# Patient Record
Sex: MALE | Race: BLACK OR AFRICAN AMERICAN | NOT HISPANIC OR LATINO | URBAN - METROPOLITAN AREA
[De-identification: names, ages, dates, MRNs, and addresses within clinical notes are randomized per-mention and may not be internally consistent; named-entity substitution may affect disease eponyms.]

---

## 2024-01-01 ENCOUNTER — INPATIENT (INPATIENT)
Facility: HOSPITAL | Age: 69
LOS: 15 days | End: 2024-01-30
Attending: INTERNAL MEDICINE | Admitting: INTERNAL MEDICINE
Payer: MEDICARE

## 2024-01-01 VITALS
HEIGHT: 70 IN | OXYGEN SATURATION: 94 % | DIASTOLIC BLOOD PRESSURE: 42 MMHG | SYSTOLIC BLOOD PRESSURE: 84 MMHG | WEIGHT: 169.98 LBS | HEART RATE: 78 BPM | RESPIRATION RATE: 24 BRPM

## 2024-01-01 VITALS
HEART RATE: 113 BPM | RESPIRATION RATE: 22 BRPM | TEMPERATURE: 99 F | SYSTOLIC BLOOD PRESSURE: 135 MMHG | DIASTOLIC BLOOD PRESSURE: 76 MMHG | OXYGEN SATURATION: 97 %

## 2024-01-01 DIAGNOSIS — Z51.5 ENCOUNTER FOR PALLIATIVE CARE: ICD-10-CM

## 2024-01-01 DIAGNOSIS — N17.9 ACUTE KIDNEY FAILURE, UNSPECIFIED: ICD-10-CM

## 2024-01-01 DIAGNOSIS — E43 UNSPECIFIED SEVERE PROTEIN-CALORIE MALNUTRITION: ICD-10-CM

## 2024-01-01 DIAGNOSIS — J96.01 ACUTE RESPIRATORY FAILURE WITH HYPOXIA: ICD-10-CM

## 2024-01-01 DIAGNOSIS — A41.9 SEPSIS, UNSPECIFIED ORGANISM: ICD-10-CM

## 2024-01-01 DIAGNOSIS — R53.2 FUNCTIONAL QUADRIPLEGIA: ICD-10-CM

## 2024-01-01 DIAGNOSIS — G93.40 ENCEPHALOPATHY, UNSPECIFIED: ICD-10-CM

## 2024-01-01 LAB
A1C WITH ESTIMATED AVERAGE GLUCOSE RESULT: 4.8 % — SIGNIFICANT CHANGE UP (ref 4–5.6)
A1C WITH ESTIMATED AVERAGE GLUCOSE RESULT: 4.8 % — SIGNIFICANT CHANGE UP (ref 4–5.6)
ALBUMIN SERPL ELPH-MCNC: 1.1 G/DL — LOW (ref 3.3–5)
ALBUMIN SERPL ELPH-MCNC: 1.2 G/DL — LOW (ref 3.3–5)
ALBUMIN SERPL ELPH-MCNC: 1.3 G/DL — LOW (ref 3.3–5)
ALBUMIN SERPL ELPH-MCNC: 1.4 G/DL — LOW (ref 3.3–5)
ALBUMIN SERPL ELPH-MCNC: 1.5 G/DL — LOW (ref 3.3–5)
ALBUMIN SERPL ELPH-MCNC: 1.5 G/DL — LOW (ref 3.3–5)
ALBUMIN SERPL ELPH-MCNC: 1.6 G/DL — LOW (ref 3.3–5)
ALP SERPL-CCNC: 107 U/L — SIGNIFICANT CHANGE UP (ref 40–120)
ALP SERPL-CCNC: 137 U/L — HIGH (ref 40–120)
ALP SERPL-CCNC: 144 U/L — HIGH (ref 40–120)
ALP SERPL-CCNC: 144 U/L — HIGH (ref 40–120)
ALP SERPL-CCNC: 201 U/L — HIGH (ref 40–120)
ALP SERPL-CCNC: 217 U/L — HIGH (ref 40–120)
ALP SERPL-CCNC: 220 U/L — HIGH (ref 40–120)
ALP SERPL-CCNC: 226 U/L — HIGH (ref 40–120)
ALP SERPL-CCNC: 235 U/L — HIGH (ref 40–120)
ALP SERPL-CCNC: 245 U/L — HIGH (ref 40–120)
ALP SERPL-CCNC: 253 U/L — HIGH (ref 40–120)
ALP SERPL-CCNC: 265 U/L — HIGH (ref 40–120)
ALP SERPL-CCNC: 280 U/L — HIGH (ref 40–120)
ALP SERPL-CCNC: 292 U/L — HIGH (ref 40–120)
ALP SERPL-CCNC: 299 U/L — HIGH (ref 40–120)
ALP SERPL-CCNC: 99 U/L — SIGNIFICANT CHANGE UP (ref 40–120)
ALT FLD-CCNC: 11 U/L — LOW (ref 12–78)
ALT FLD-CCNC: 11 U/L — LOW (ref 12–78)
ALT FLD-CCNC: 13 U/L — SIGNIFICANT CHANGE UP (ref 12–78)
ALT FLD-CCNC: 14 U/L — SIGNIFICANT CHANGE UP (ref 12–78)
ALT FLD-CCNC: 14 U/L — SIGNIFICANT CHANGE UP (ref 12–78)
ALT FLD-CCNC: 17 U/L — SIGNIFICANT CHANGE UP (ref 12–78)
ALT FLD-CCNC: 18 U/L — SIGNIFICANT CHANGE UP (ref 12–78)
ALT FLD-CCNC: 20 U/L — SIGNIFICANT CHANGE UP (ref 12–78)
ALT FLD-CCNC: 21 U/L — SIGNIFICANT CHANGE UP (ref 12–78)
ALT FLD-CCNC: 23 U/L — SIGNIFICANT CHANGE UP (ref 12–78)
ALT FLD-CCNC: 24 U/L — SIGNIFICANT CHANGE UP (ref 12–78)
ALT FLD-CCNC: 8 U/L — LOW (ref 12–78)
AMMONIA BLD-MCNC: 24 UMOL/L — SIGNIFICANT CHANGE UP (ref 11–32)
AMMONIA BLD-MCNC: 24 UMOL/L — SIGNIFICANT CHANGE UP (ref 11–32)
AMMONIA BLD-MCNC: 35 UMOL/L — HIGH (ref 11–32)
AMMONIA BLD-MCNC: 40 UMOL/L — HIGH (ref 11–32)
AMMONIA BLD-MCNC: 45 UMOL/L — HIGH (ref 11–32)
AMMONIA BLD-MCNC: 65 UMOL/L — HIGH (ref 11–32)
ANION GAP SERPL CALC-SCNC: 10 MMOL/L — SIGNIFICANT CHANGE UP (ref 5–17)
ANION GAP SERPL CALC-SCNC: 11 MMOL/L — SIGNIFICANT CHANGE UP (ref 5–17)
ANION GAP SERPL CALC-SCNC: 11 MMOL/L — SIGNIFICANT CHANGE UP (ref 5–17)
ANION GAP SERPL CALC-SCNC: 12 MMOL/L — SIGNIFICANT CHANGE UP (ref 5–17)
ANION GAP SERPL CALC-SCNC: 3 MMOL/L — LOW (ref 5–17)
ANION GAP SERPL CALC-SCNC: 5 MMOL/L — SIGNIFICANT CHANGE UP (ref 5–17)
ANION GAP SERPL CALC-SCNC: 5 MMOL/L — SIGNIFICANT CHANGE UP (ref 5–17)
ANION GAP SERPL CALC-SCNC: 6 MMOL/L — SIGNIFICANT CHANGE UP (ref 5–17)
ANION GAP SERPL CALC-SCNC: 6 MMOL/L — SIGNIFICANT CHANGE UP (ref 5–17)
ANION GAP SERPL CALC-SCNC: 7 MMOL/L — SIGNIFICANT CHANGE UP (ref 5–17)
ANION GAP SERPL CALC-SCNC: 8 MMOL/L — SIGNIFICANT CHANGE UP (ref 5–17)
ANION GAP SERPL CALC-SCNC: 9 MMOL/L — SIGNIFICANT CHANGE UP (ref 5–17)
ANISOCYTOSIS BLD QL: SIGNIFICANT CHANGE UP
ANISOCYTOSIS BLD QL: SIGNIFICANT CHANGE UP
ANISOCYTOSIS BLD QL: SLIGHT — SIGNIFICANT CHANGE UP
APPEARANCE UR: ABNORMAL
APPEARANCE UR: ABNORMAL
APTT BLD: 44.6 SEC — HIGH (ref 24.5–35.6)
APTT BLD: 44.6 SEC — HIGH (ref 24.5–35.6)
APTT BLD: 46.7 SEC — HIGH (ref 24.5–35.6)
APTT BLD: 46.7 SEC — HIGH (ref 24.5–35.6)
AST SERPL-CCNC: 32 U/L — SIGNIFICANT CHANGE UP (ref 15–37)
AST SERPL-CCNC: 39 U/L — HIGH (ref 15–37)
AST SERPL-CCNC: 45 U/L — HIGH (ref 15–37)
AST SERPL-CCNC: 46 U/L — HIGH (ref 15–37)
AST SERPL-CCNC: 47 U/L — HIGH (ref 15–37)
AST SERPL-CCNC: 48 U/L — HIGH (ref 15–37)
AST SERPL-CCNC: 48 U/L — HIGH (ref 15–37)
AST SERPL-CCNC: 50 U/L — HIGH (ref 15–37)
AST SERPL-CCNC: 52 U/L — HIGH (ref 15–37)
AST SERPL-CCNC: 52 U/L — HIGH (ref 15–37)
AST SERPL-CCNC: 55 U/L — HIGH (ref 15–37)
AST SERPL-CCNC: 55 U/L — HIGH (ref 15–37)
AST SERPL-CCNC: 60 U/L — HIGH (ref 15–37)
AST SERPL-CCNC: 65 U/L — HIGH (ref 15–37)
AST SERPL-CCNC: 65 U/L — HIGH (ref 15–37)
AST SERPL-CCNC: 79 U/L — HIGH (ref 15–37)
AST SERPL-CCNC: 80 U/L — HIGH (ref 15–37)
AST SERPL-CCNC: 87 U/L — HIGH (ref 15–37)
AST SERPL-CCNC: 92 U/L — HIGH (ref 15–37)
BACTERIA # UR AUTO: ABNORMAL /HPF
BACTERIA # UR AUTO: ABNORMAL /HPF
BASE EXCESS BLDA CALC-SCNC: 17.5 MMOL/L — HIGH (ref -2–3)
BASE EXCESS BLDV CALC-SCNC: 7.2 MMOL/L — HIGH (ref -2–3)
BASE EXCESS BLDV CALC-SCNC: 7.2 MMOL/L — HIGH (ref -2–3)
BASE EXCESS BLDV CALC-SCNC: 9.7 MMOL/L — HIGH (ref -2–3)
BASE EXCESS BLDV CALC-SCNC: 9.7 MMOL/L — HIGH (ref -2–3)
BASOPHILS # BLD AUTO: 0 K/UL — SIGNIFICANT CHANGE UP (ref 0–0.2)
BASOPHILS # BLD AUTO: 0.01 K/UL — SIGNIFICANT CHANGE UP (ref 0–0.2)
BASOPHILS # BLD AUTO: 0.01 K/UL — SIGNIFICANT CHANGE UP (ref 0–0.2)
BASOPHILS # BLD AUTO: 0.08 K/UL — SIGNIFICANT CHANGE UP (ref 0–0.2)
BASOPHILS # BLD AUTO: 0.09 K/UL — SIGNIFICANT CHANGE UP (ref 0–0.2)
BASOPHILS # BLD AUTO: 0.12 K/UL — SIGNIFICANT CHANGE UP (ref 0–0.2)
BASOPHILS # BLD AUTO: 0.12 K/UL — SIGNIFICANT CHANGE UP (ref 0–0.2)
BASOPHILS # BLD AUTO: 0.13 K/UL — SIGNIFICANT CHANGE UP (ref 0–0.2)
BASOPHILS NFR BLD AUTO: 0 % — SIGNIFICANT CHANGE UP (ref 0–2)
BASOPHILS NFR BLD AUTO: 0.2 % — SIGNIFICANT CHANGE UP (ref 0–2)
BASOPHILS NFR BLD AUTO: 0.2 % — SIGNIFICANT CHANGE UP (ref 0–2)
BASOPHILS NFR BLD AUTO: 0.5 % — SIGNIFICANT CHANGE UP (ref 0–2)
BASOPHILS NFR BLD AUTO: 0.5 % — SIGNIFICANT CHANGE UP (ref 0–2)
BASOPHILS NFR BLD AUTO: 0.6 % — SIGNIFICANT CHANGE UP (ref 0–2)
BASOPHILS NFR BLD AUTO: 0.9 % — SIGNIFICANT CHANGE UP (ref 0–2)
BASOPHILS NFR BLD AUTO: 0.9 % — SIGNIFICANT CHANGE UP (ref 0–2)
BILIRUB SERPL-MCNC: 0.1 MG/DL — LOW (ref 0.2–1.2)
BILIRUB SERPL-MCNC: 0.2 MG/DL — SIGNIFICANT CHANGE UP (ref 0.2–1.2)
BILIRUB SERPL-MCNC: 0.2 MG/DL — SIGNIFICANT CHANGE UP (ref 0.2–1.2)
BILIRUB SERPL-MCNC: 0.3 MG/DL — SIGNIFICANT CHANGE UP (ref 0.2–1.2)
BILIRUB SERPL-MCNC: 0.4 MG/DL — SIGNIFICANT CHANGE UP (ref 0.2–1.2)
BILIRUB SERPL-MCNC: 0.4 MG/DL — SIGNIFICANT CHANGE UP (ref 0.2–1.2)
BILIRUB SERPL-MCNC: 0.5 MG/DL — SIGNIFICANT CHANGE UP (ref 0.2–1.2)
BILIRUB SERPL-MCNC: 0.5 MG/DL — SIGNIFICANT CHANGE UP (ref 0.2–1.2)
BILIRUB UR-MCNC: NEGATIVE — SIGNIFICANT CHANGE UP
BILIRUB UR-MCNC: NEGATIVE — SIGNIFICANT CHANGE UP
BLD GP AB SCN SERPL QL: SIGNIFICANT CHANGE UP
BLOOD GAS COMMENTS ARTERIAL: SIGNIFICANT CHANGE UP
BLOOD GAS COMMENTS, VENOUS: SIGNIFICANT CHANGE UP
BUN SERPL-MCNC: 103 MG/DL — HIGH (ref 7–23)
BUN SERPL-MCNC: 103 MG/DL — HIGH (ref 7–23)
BUN SERPL-MCNC: 104 MG/DL — HIGH (ref 7–23)
BUN SERPL-MCNC: 104 MG/DL — HIGH (ref 7–23)
BUN SERPL-MCNC: 106 MG/DL — HIGH (ref 7–23)
BUN SERPL-MCNC: 107 MG/DL — HIGH (ref 7–23)
BUN SERPL-MCNC: 107 MG/DL — HIGH (ref 7–23)
BUN SERPL-MCNC: 115 MG/DL — HIGH (ref 7–23)
BUN SERPL-MCNC: 120 MG/DL — HIGH (ref 7–23)
BUN SERPL-MCNC: 133 MG/DL — HIGH (ref 7–23)
BUN SERPL-MCNC: 139 MG/DL — HIGH (ref 7–23)
BUN SERPL-MCNC: 145 MG/DL — HIGH (ref 7–23)
BUN SERPL-MCNC: 148 MG/DL — SIGNIFICANT CHANGE UP (ref 7–23)
BUN SERPL-MCNC: 80 MG/DL — HIGH (ref 7–23)
BUN SERPL-MCNC: 89 MG/DL — HIGH (ref 7–23)
BUN SERPL-MCNC: 92 MG/DL — HIGH (ref 7–23)
BUN SERPL-MCNC: 92 MG/DL — HIGH (ref 7–23)
BUN SERPL-MCNC: 93 MG/DL — HIGH (ref 7–23)
BUN SERPL-MCNC: 93 MG/DL — HIGH (ref 7–23)
BUN SERPL-MCNC: 94 MG/DL — HIGH (ref 7–23)
BUN SERPL-MCNC: 95 MG/DL — HIGH (ref 7–23)
BUN SERPL-MCNC: 96 MG/DL — HIGH (ref 7–23)
BUN SERPL-MCNC: 97 MG/DL — HIGH (ref 7–23)
CALCIUM SERPL-MCNC: 8 MG/DL — LOW (ref 8.5–10.1)
CALCIUM SERPL-MCNC: 8 MG/DL — LOW (ref 8.5–10.1)
CALCIUM SERPL-MCNC: 8.2 MG/DL — LOW (ref 8.5–10.1)
CALCIUM SERPL-MCNC: 8.3 MG/DL — LOW (ref 8.5–10.1)
CALCIUM SERPL-MCNC: 8.4 MG/DL — LOW (ref 8.5–10.1)
CALCIUM SERPL-MCNC: 8.5 MG/DL — SIGNIFICANT CHANGE UP (ref 8.5–10.1)
CALCIUM SERPL-MCNC: 8.6 MG/DL — SIGNIFICANT CHANGE UP (ref 8.5–10.1)
CALCIUM SERPL-MCNC: 8.7 MG/DL — SIGNIFICANT CHANGE UP (ref 8.5–10.1)
CALCIUM SERPL-MCNC: 8.7 MG/DL — SIGNIFICANT CHANGE UP (ref 8.5–10.1)
CALCIUM SERPL-MCNC: 8.9 MG/DL — SIGNIFICANT CHANGE UP (ref 8.5–10.1)
CALCIUM SERPL-MCNC: 9.1 MG/DL — SIGNIFICANT CHANGE UP (ref 8.5–10.1)
CALCIUM SERPL-MCNC: 9.7 MG/DL — SIGNIFICANT CHANGE UP (ref 8.5–10.1)
CALCIUM SERPL-MCNC: 9.7 MG/DL — SIGNIFICANT CHANGE UP (ref 8.5–10.1)
CHLORIDE BLDV-SCNC: 94 MMOL/L — LOW (ref 98–107)
CHLORIDE BLDV-SCNC: 94 MMOL/L — LOW (ref 98–107)
CHLORIDE BLDV-SCNC: 97 MMOL/L — LOW (ref 98–107)
CHLORIDE BLDV-SCNC: 97 MMOL/L — LOW (ref 98–107)
CHLORIDE SERPL-SCNC: 100 MMOL/L — SIGNIFICANT CHANGE UP (ref 96–108)
CHLORIDE SERPL-SCNC: 100 MMOL/L — SIGNIFICANT CHANGE UP (ref 96–108)
CHLORIDE SERPL-SCNC: 101 MMOL/L — SIGNIFICANT CHANGE UP (ref 96–108)
CHLORIDE SERPL-SCNC: 102 MMOL/L — SIGNIFICANT CHANGE UP (ref 96–108)
CHLORIDE SERPL-SCNC: 103 MMOL/L — SIGNIFICANT CHANGE UP (ref 96–108)
CHLORIDE SERPL-SCNC: 104 MMOL/L — SIGNIFICANT CHANGE UP (ref 96–108)
CHLORIDE SERPL-SCNC: 105 MMOL/L — SIGNIFICANT CHANGE UP (ref 96–108)
CHLORIDE SERPL-SCNC: 105 MMOL/L — SIGNIFICANT CHANGE UP (ref 96–108)
CHLORIDE SERPL-SCNC: 106 MMOL/L — SIGNIFICANT CHANGE UP (ref 96–108)
CHLORIDE SERPL-SCNC: 109 MMOL/L — HIGH (ref 96–108)
CHLORIDE SERPL-SCNC: 109 MMOL/L — HIGH (ref 96–108)
CHLORIDE SERPL-SCNC: 111 MMOL/L — HIGH (ref 96–108)
CHLORIDE SERPL-SCNC: 98 MMOL/L — SIGNIFICANT CHANGE UP (ref 96–108)
CHLORIDE SERPL-SCNC: 99 MMOL/L — SIGNIFICANT CHANGE UP (ref 96–108)
CO2 BLDA-SCNC: 44 MMOL/L — HIGH (ref 19–24)
CO2 BLDV-SCNC: 32 MMOL/L — HIGH (ref 22–26)
CO2 BLDV-SCNC: 32 MMOL/L — HIGH (ref 22–26)
CO2 BLDV-SCNC: 39 MMOL/L — HIGH (ref 22–26)
CO2 BLDV-SCNC: 39 MMOL/L — HIGH (ref 22–26)
CO2 SERPL-SCNC: 28 MMOL/L — SIGNIFICANT CHANGE UP (ref 22–31)
CO2 SERPL-SCNC: 28 MMOL/L — SIGNIFICANT CHANGE UP (ref 22–31)
CO2 SERPL-SCNC: 29 MMOL/L — SIGNIFICANT CHANGE UP (ref 22–31)
CO2 SERPL-SCNC: 30 MMOL/L — SIGNIFICANT CHANGE UP (ref 22–31)
CO2 SERPL-SCNC: 30 MMOL/L — SIGNIFICANT CHANGE UP (ref 22–31)
CO2 SERPL-SCNC: 31 MMOL/L — SIGNIFICANT CHANGE UP (ref 22–31)
CO2 SERPL-SCNC: 31 MMOL/L — SIGNIFICANT CHANGE UP (ref 22–31)
CO2 SERPL-SCNC: 32 MMOL/L — HIGH (ref 22–31)
CO2 SERPL-SCNC: 33 MMOL/L — HIGH (ref 22–31)
CO2 SERPL-SCNC: 34 MMOL/L — HIGH (ref 22–31)
CO2 SERPL-SCNC: 35 MMOL/L — HIGH (ref 22–31)
CO2 SERPL-SCNC: 36 MMOL/L — HIGH (ref 22–31)
CO2 SERPL-SCNC: 36 MMOL/L — HIGH (ref 22–31)
CO2 SERPL-SCNC: 37 MMOL/L — HIGH (ref 22–31)
CO2 SERPL-SCNC: 37 MMOL/L — HIGH (ref 22–31)
CO2 SERPL-SCNC: 38 MMOL/L — HIGH (ref 22–31)
COLOR SPEC: YELLOW — SIGNIFICANT CHANGE UP
COLOR SPEC: YELLOW — SIGNIFICANT CHANGE UP
COMMENT - URINE: SIGNIFICANT CHANGE UP
COMMENT - URINE: SIGNIFICANT CHANGE UP
CORTICOSTEROID BINDING GLOBULIN RESULT: 1.6 MG/DL — LOW
CORTIS F/TOTAL MFR SERPL: 73 % — SIGNIFICANT CHANGE UP
CORTIS SERPL-MCNC: 41 UG/DL — HIGH
CORTISOL, FREE RESULT: 30 UG/DL — HIGH
CREAT SERPL-MCNC: 1.22 MG/DL — SIGNIFICANT CHANGE UP (ref 0.5–1.3)
CREAT SERPL-MCNC: 1.36 MG/DL — HIGH (ref 0.5–1.3)
CREAT SERPL-MCNC: 1.5 MG/DL — HIGH (ref 0.5–1.3)
CREAT SERPL-MCNC: 1.7 MG/DL — HIGH (ref 0.5–1.3)
CREAT SERPL-MCNC: 1.99 MG/DL — HIGH (ref 0.5–1.3)
CREAT SERPL-MCNC: 2 MG/DL — HIGH (ref 0.5–1.3)
CREAT SERPL-MCNC: 2.1 MG/DL — HIGH (ref 0.5–1.3)
CREAT SERPL-MCNC: 2.16 MG/DL — HIGH (ref 0.5–1.3)
CREAT SERPL-MCNC: 2.16 MG/DL — HIGH (ref 0.5–1.3)
CREAT SERPL-MCNC: 2.18 MG/DL — HIGH (ref 0.5–1.3)
CREAT SERPL-MCNC: 2.18 MG/DL — HIGH (ref 0.5–1.3)
CREAT SERPL-MCNC: 2.2 MG/DL — HIGH (ref 0.5–1.3)
CREAT SERPL-MCNC: 2.22 MG/DL — HIGH (ref 0.5–1.3)
CREAT SERPL-MCNC: 2.26 MG/DL — HIGH (ref 0.5–1.3)
CREAT SERPL-MCNC: 2.26 MG/DL — HIGH (ref 0.5–1.3)
CREAT SERPL-MCNC: 2.28 MG/DL — HIGH (ref 0.5–1.3)
CREAT SERPL-MCNC: 2.3 MG/DL — HIGH (ref 0.5–1.3)
CREAT SERPL-MCNC: 2.31 MG/DL — HIGH (ref 0.5–1.3)
CREAT SERPL-MCNC: 2.31 MG/DL — HIGH (ref 0.5–1.3)
CREAT SERPL-MCNC: 2.49 MG/DL — HIGH (ref 0.5–1.3)
CREAT SERPL-MCNC: 2.57 MG/DL — HIGH (ref 0.5–1.3)
CREAT SERPL-MCNC: 2.69 MG/DL — HIGH (ref 0.5–1.3)
CREAT SERPL-MCNC: 2.69 MG/DL — HIGH (ref 0.5–1.3)
CREAT SERPL-MCNC: 3.08 MG/DL — HIGH (ref 0.5–1.3)
CREAT SERPL-MCNC: 3.08 MG/DL — HIGH (ref 0.5–1.3)
CULTURE RESULTS: ABNORMAL
CULTURE RESULTS: ABNORMAL
CULTURE RESULTS: SIGNIFICANT CHANGE UP
CULTURE RESULTS: SIGNIFICANT CHANGE UP
DIFF PNL FLD: ABNORMAL
DIFF PNL FLD: ABNORMAL
EGFR: 22 ML/MIN/1.73M2 — LOW
EGFR: 22 ML/MIN/1.73M2 — LOW
EGFR: 25 ML/MIN/1.73M2 — LOW
EGFR: 25 ML/MIN/1.73M2 — LOW
EGFR: 27 ML/MIN/1.73M2 — LOW
EGFR: 28 ML/MIN/1.73M2 — LOW
EGFR: 30 ML/MIN/1.73M2 — LOW
EGFR: 30 ML/MIN/1.73M2 — LOW
EGFR: 31 ML/MIN/1.73M2 — LOW
EGFR: 32 ML/MIN/1.73M2 — LOW
EGFR: 32 ML/MIN/1.73M2 — LOW
EGFR: 33 ML/MIN/1.73M2 — LOW
EGFR: 34 ML/MIN/1.73M2 — LOW
EGFR: 36 ML/MIN/1.73M2 — LOW
EGFR: 36 ML/MIN/1.73M2 — LOW
EGFR: 44 ML/MIN/1.73M2 — LOW
EGFR: 51 ML/MIN/1.73M2 — LOW
EGFR: 57 ML/MIN/1.73M2 — LOW
EGFR: 65 ML/MIN/1.73M2 — SIGNIFICANT CHANGE UP
EOSINOPHIL # BLD AUTO: 0 K/UL — SIGNIFICANT CHANGE UP (ref 0–0.5)
EOSINOPHIL # BLD AUTO: 0.01 K/UL — SIGNIFICANT CHANGE UP (ref 0–0.5)
EOSINOPHIL # BLD AUTO: 0.18 K/UL — SIGNIFICANT CHANGE UP (ref 0–0.5)
EOSINOPHIL # BLD AUTO: 0.18 K/UL — SIGNIFICANT CHANGE UP (ref 0–0.5)
EOSINOPHIL # BLD AUTO: 0.26 K/UL — SIGNIFICANT CHANGE UP (ref 0–0.5)
EOSINOPHIL # BLD AUTO: 0.3 K/UL — SIGNIFICANT CHANGE UP (ref 0–0.5)
EOSINOPHIL # BLD AUTO: 0.31 K/UL — SIGNIFICANT CHANGE UP (ref 0–0.5)
EOSINOPHIL NFR BLD AUTO: 0 % — SIGNIFICANT CHANGE UP (ref 0–6)
EOSINOPHIL NFR BLD AUTO: 0.1 % — SIGNIFICANT CHANGE UP (ref 0–6)
EOSINOPHIL NFR BLD AUTO: 0.1 % — SIGNIFICANT CHANGE UP (ref 0–6)
EOSINOPHIL NFR BLD AUTO: 0.2 % — SIGNIFICANT CHANGE UP (ref 0–6)
EOSINOPHIL NFR BLD AUTO: 0.2 % — SIGNIFICANT CHANGE UP (ref 0–6)
EOSINOPHIL NFR BLD AUTO: 1.5 % — SIGNIFICANT CHANGE UP (ref 0–6)
EOSINOPHIL NFR BLD AUTO: 1.5 % — SIGNIFICANT CHANGE UP (ref 0–6)
EOSINOPHIL NFR BLD AUTO: 1.8 % — SIGNIFICANT CHANGE UP (ref 0–6)
EOSINOPHIL NFR BLD AUTO: 2 % — SIGNIFICANT CHANGE UP (ref 0–6)
EOSINOPHIL NFR BLD AUTO: 2 % — SIGNIFICANT CHANGE UP (ref 0–6)
ESTIMATED AVERAGE GLUCOSE: 91 MG/DL — SIGNIFICANT CHANGE UP (ref 68–114)
ESTIMATED AVERAGE GLUCOSE: 91 MG/DL — SIGNIFICANT CHANGE UP (ref 68–114)
FLUAV AG NPH QL: SIGNIFICANT CHANGE UP
FLUAV AG NPH QL: SIGNIFICANT CHANGE UP
FLUBV AG NPH QL: SIGNIFICANT CHANGE UP
FLUBV AG NPH QL: SIGNIFICANT CHANGE UP
GAS PNL BLDA: SIGNIFICANT CHANGE UP
GAS PNL BLDV: 133 MMOL/L — LOW (ref 136–145)
GAS PNL BLDV: 133 MMOL/L — LOW (ref 136–145)
GAS PNL BLDV: 135 MMOL/L — LOW (ref 136–145)
GAS PNL BLDV: 135 MMOL/L — LOW (ref 136–145)
GAS PNL BLDV: SIGNIFICANT CHANGE UP
GIANT PLATELETS BLD QL SMEAR: PRESENT — SIGNIFICANT CHANGE UP
GIANT PLATELETS BLD QL SMEAR: PRESENT — SIGNIFICANT CHANGE UP
GLUCOSE BLDC GLUCOMTR-MCNC: 107 MG/DL — HIGH (ref 70–99)
GLUCOSE BLDC GLUCOMTR-MCNC: 107 MG/DL — HIGH (ref 70–99)
GLUCOSE BLDC GLUCOMTR-MCNC: 136 MG/DL — HIGH (ref 70–99)
GLUCOSE BLDC GLUCOMTR-MCNC: 138 MG/DL — HIGH (ref 70–99)
GLUCOSE BLDC GLUCOMTR-MCNC: 138 MG/DL — HIGH (ref 70–99)
GLUCOSE BLDC GLUCOMTR-MCNC: 146 MG/DL — HIGH (ref 70–99)
GLUCOSE BLDC GLUCOMTR-MCNC: 146 MG/DL — HIGH (ref 70–99)
GLUCOSE BLDC GLUCOMTR-MCNC: 157 MG/DL — HIGH (ref 70–99)
GLUCOSE BLDC GLUCOMTR-MCNC: 161 MG/DL — HIGH (ref 70–99)
GLUCOSE BLDC GLUCOMTR-MCNC: 169 MG/DL — HIGH (ref 70–99)
GLUCOSE BLDC GLUCOMTR-MCNC: 169 MG/DL — HIGH (ref 70–99)
GLUCOSE BLDC GLUCOMTR-MCNC: 171 MG/DL — HIGH (ref 70–99)
GLUCOSE BLDC GLUCOMTR-MCNC: 183 MG/DL — HIGH (ref 70–99)
GLUCOSE BLDC GLUCOMTR-MCNC: 189 MG/DL — HIGH (ref 70–99)
GLUCOSE BLDC GLUCOMTR-MCNC: 189 MG/DL — HIGH (ref 70–99)
GLUCOSE BLDC GLUCOMTR-MCNC: 209 MG/DL — HIGH (ref 70–99)
GLUCOSE BLDC GLUCOMTR-MCNC: 225 MG/DL — HIGH (ref 70–99)
GLUCOSE BLDC GLUCOMTR-MCNC: 241 MG/DL — HIGH (ref 70–99)
GLUCOSE BLDC GLUCOMTR-MCNC: 71 MG/DL — SIGNIFICANT CHANGE UP (ref 70–99)
GLUCOSE BLDC GLUCOMTR-MCNC: 75 MG/DL — SIGNIFICANT CHANGE UP (ref 70–99)
GLUCOSE BLDC GLUCOMTR-MCNC: 83 MG/DL — SIGNIFICANT CHANGE UP (ref 70–99)
GLUCOSE BLDC GLUCOMTR-MCNC: 83 MG/DL — SIGNIFICANT CHANGE UP (ref 70–99)
GLUCOSE BLDC GLUCOMTR-MCNC: 99 MG/DL — SIGNIFICANT CHANGE UP (ref 70–99)
GLUCOSE BLDC GLUCOMTR-MCNC: 99 MG/DL — SIGNIFICANT CHANGE UP (ref 70–99)
GLUCOSE BLDV-MCNC: 105 MG/DL — HIGH (ref 65–95)
GLUCOSE BLDV-MCNC: 105 MG/DL — HIGH (ref 65–95)
GLUCOSE BLDV-MCNC: 31 MG/DL — CRITICAL LOW (ref 65–95)
GLUCOSE BLDV-MCNC: 31 MG/DL — CRITICAL LOW (ref 65–95)
GLUCOSE SERPL-MCNC: 106 MG/DL — HIGH (ref 70–99)
GLUCOSE SERPL-MCNC: 106 MG/DL — HIGH (ref 70–99)
GLUCOSE SERPL-MCNC: 126 MG/DL — HIGH (ref 70–99)
GLUCOSE SERPL-MCNC: 128 MG/DL — HIGH (ref 70–99)
GLUCOSE SERPL-MCNC: 132 MG/DL — HIGH (ref 70–99)
GLUCOSE SERPL-MCNC: 134 MG/DL — HIGH (ref 70–99)
GLUCOSE SERPL-MCNC: 136 MG/DL — HIGH (ref 70–99)
GLUCOSE SERPL-MCNC: 137 MG/DL — HIGH (ref 70–99)
GLUCOSE SERPL-MCNC: 141 MG/DL — HIGH (ref 70–99)
GLUCOSE SERPL-MCNC: 142 MG/DL — HIGH (ref 70–99)
GLUCOSE SERPL-MCNC: 142 MG/DL — HIGH (ref 70–99)
GLUCOSE SERPL-MCNC: 160 MG/DL — HIGH (ref 70–99)
GLUCOSE SERPL-MCNC: 161 MG/DL — HIGH (ref 70–99)
GLUCOSE SERPL-MCNC: 165 MG/DL — HIGH (ref 70–99)
GLUCOSE SERPL-MCNC: 180 MG/DL — HIGH (ref 70–99)
GLUCOSE SERPL-MCNC: 189 MG/DL — HIGH (ref 70–99)
GLUCOSE SERPL-MCNC: 191 MG/DL — HIGH (ref 70–99)
GLUCOSE SERPL-MCNC: 225 MG/DL — HIGH (ref 70–99)
GLUCOSE SERPL-MCNC: 261 MG/DL — HIGH (ref 70–99)
GLUCOSE SERPL-MCNC: 261 MG/DL — HIGH (ref 70–99)
GLUCOSE SERPL-MCNC: 39 MG/DL — CRITICAL LOW (ref 70–99)
GLUCOSE SERPL-MCNC: 39 MG/DL — CRITICAL LOW (ref 70–99)
GLUCOSE UR QL: NEGATIVE MG/DL — SIGNIFICANT CHANGE UP
GLUCOSE UR QL: NEGATIVE MG/DL — SIGNIFICANT CHANGE UP
GRAM STN FLD: ABNORMAL
HCO3 BLDA-SCNC: 42 MMOL/L — HIGH (ref 21–28)
HCO3 BLDV-SCNC: 31 MMOL/L — HIGH (ref 22–28)
HCO3 BLDV-SCNC: 31 MMOL/L — HIGH (ref 22–28)
HCO3 BLDV-SCNC: 37 MMOL/L — HIGH (ref 22–28)
HCO3 BLDV-SCNC: 37 MMOL/L — HIGH (ref 22–28)
HCT VFR BLD CALC: 21.1 % — LOW (ref 39–50)
HCT VFR BLD CALC: 21.5 % — LOW (ref 39–50)
HCT VFR BLD CALC: 22.1 % — LOW (ref 39–50)
HCT VFR BLD CALC: 22.2 % — LOW (ref 39–50)
HCT VFR BLD CALC: 22.3 % — LOW (ref 39–50)
HCT VFR BLD CALC: 22.4 % — LOW (ref 39–50)
HCT VFR BLD CALC: 22.8 % — LOW (ref 39–50)
HCT VFR BLD CALC: 23.1 % — LOW (ref 39–50)
HCT VFR BLD CALC: 23.5 % — LOW (ref 39–50)
HCT VFR BLD CALC: 23.8 % — LOW (ref 39–50)
HCT VFR BLD CALC: 24.2 % — LOW (ref 39–50)
HCT VFR BLD CALC: 24.6 % — LOW (ref 39–50)
HCT VFR BLD CALC: 24.6 % — LOW (ref 39–50)
HCT VFR BLD CALC: 25 % — LOW (ref 39–50)
HCT VFR BLD CALC: 25.4 % — LOW (ref 39–50)
HCT VFR BLD CALC: 25.6 % — LOW (ref 39–50)
HCT VFR BLD CALC: 26.1 % — LOW (ref 39–50)
HCT VFR BLD CALC: 26.2 % — LOW (ref 39–50)
HCT VFR BLD CALC: 26.3 % — LOW (ref 39–50)
HCT VFR BLD CALC: 26.3 % — LOW (ref 39–50)
HCT VFR BLD CALC: 29 % — LOW (ref 39–50)
HCT VFR BLD CALC: 29 % — LOW (ref 39–50)
HCT VFR BLDA CALC: 32 % — LOW (ref 37–47)
HCT VFR BLDA CALC: 32 % — LOW (ref 37–47)
HCT VFR BLDA CALC: 39 % — SIGNIFICANT CHANGE UP (ref 37–47)
HCT VFR BLDA CALC: 39 % — SIGNIFICANT CHANGE UP (ref 37–47)
HCV AB S/CO SERPL IA: 0.08 S/CO — SIGNIFICANT CHANGE UP (ref 0–0.99)
HCV AB S/CO SERPL IA: 0.08 S/CO — SIGNIFICANT CHANGE UP (ref 0–0.99)
HCV AB SERPL-IMP: SIGNIFICANT CHANGE UP
HCV AB SERPL-IMP: SIGNIFICANT CHANGE UP
HEPARIN-PF4 AB RESULT: <0.6 U/ML — SIGNIFICANT CHANGE UP (ref 0–0.9)
HGB BLD CALC-MCNC: 10.8 G/DL — LOW (ref 12.6–17.4)
HGB BLD CALC-MCNC: 10.8 G/DL — LOW (ref 12.6–17.4)
HGB BLD CALC-MCNC: 13.1 G/DL — SIGNIFICANT CHANGE UP (ref 12.6–17.4)
HGB BLD CALC-MCNC: 13.1 G/DL — SIGNIFICANT CHANGE UP (ref 12.6–17.4)
HGB BLD-MCNC: 6.7 G/DL — CRITICAL LOW (ref 13–17)
HGB BLD-MCNC: 6.8 G/DL — CRITICAL LOW (ref 13–17)
HGB BLD-MCNC: 6.9 G/DL — CRITICAL LOW (ref 13–17)
HGB BLD-MCNC: 6.9 G/DL — CRITICAL LOW (ref 13–17)
HGB BLD-MCNC: 7.1 G/DL — LOW (ref 13–17)
HGB BLD-MCNC: 7.2 G/DL — LOW (ref 13–17)
HGB BLD-MCNC: 7.3 G/DL — LOW (ref 13–17)
HGB BLD-MCNC: 7.4 G/DL — LOW (ref 13–17)
HGB BLD-MCNC: 7.4 G/DL — LOW (ref 13–17)
HGB BLD-MCNC: 7.9 G/DL — LOW (ref 13–17)
HGB BLD-MCNC: 7.9 G/DL — LOW (ref 13–17)
HGB BLD-MCNC: 8 G/DL — LOW (ref 13–17)
HGB BLD-MCNC: 8.1 G/DL — LOW (ref 13–17)
HGB BLD-MCNC: 8.2 G/DL — LOW (ref 13–17)
HGB BLD-MCNC: 9 G/DL — LOW (ref 13–17)
HGB BLD-MCNC: 9 G/DL — LOW (ref 13–17)
HOROWITZ INDEX BLDA+IHG-RTO: 60 — SIGNIFICANT CHANGE UP
HOROWITZ INDEX BLDV+IHG-RTO: SIGNIFICANT CHANGE UP
HOROWITZ INDEX BLDV+IHG-RTO: SIGNIFICANT CHANGE UP
HYPOCHROMIA BLD QL: SLIGHT — SIGNIFICANT CHANGE UP
IMM GRANULOCYTES NFR BLD AUTO: 0.2 % — SIGNIFICANT CHANGE UP (ref 0–0.9)
IMM GRANULOCYTES NFR BLD AUTO: 0.2 % — SIGNIFICANT CHANGE UP (ref 0–0.9)
IMM GRANULOCYTES NFR BLD AUTO: 0.9 % — SIGNIFICANT CHANGE UP (ref 0–0.9)
IMM GRANULOCYTES NFR BLD AUTO: 0.9 % — SIGNIFICANT CHANGE UP (ref 0–0.9)
IMM GRANULOCYTES NFR BLD AUTO: 1.7 % — HIGH (ref 0–0.9)
IMM GRANULOCYTES NFR BLD AUTO: 1.7 % — HIGH (ref 0–0.9)
IMM GRANULOCYTES NFR BLD AUTO: 2.8 % — HIGH (ref 0–0.9)
INR BLD: 1.06 RATIO — SIGNIFICANT CHANGE UP (ref 0.85–1.18)
INR BLD: 1.06 RATIO — SIGNIFICANT CHANGE UP (ref 0.85–1.18)
INR BLD: 1.19 RATIO — HIGH (ref 0.85–1.18)
INR BLD: 1.19 RATIO — HIGH (ref 0.85–1.18)
KETONES UR-MCNC: NEGATIVE MG/DL — SIGNIFICANT CHANGE UP
KETONES UR-MCNC: NEGATIVE MG/DL — SIGNIFICANT CHANGE UP
LACOSAMIDE (VIMPAT) RESULT: <0.5 UG/ML — LOW (ref 1–10)
LACTATE BLDV-MCNC: 3.4 MMOL/L — HIGH (ref 0.56–1.39)
LACTATE BLDV-MCNC: 3.4 MMOL/L — HIGH (ref 0.56–1.39)
LACTATE BLDV-MCNC: 4.5 MMOL/L — CRITICAL HIGH (ref 0.56–1.39)
LACTATE BLDV-MCNC: 4.5 MMOL/L — CRITICAL HIGH (ref 0.56–1.39)
LACTATE SERPL-SCNC: 4 MMOL/L — CRITICAL HIGH (ref 0.7–2)
LACTATE SERPL-SCNC: 4 MMOL/L — CRITICAL HIGH (ref 0.7–2)
LACTATE SERPL-SCNC: 4.8 MMOL/L — CRITICAL HIGH (ref 0.7–2)
LACTATE SERPL-SCNC: 4.8 MMOL/L — CRITICAL HIGH (ref 0.7–2)
LEGIONELLA AG UR QL: NEGATIVE — SIGNIFICANT CHANGE UP
LEUKOCYTE ESTERASE UR-ACNC: ABNORMAL
LEUKOCYTE ESTERASE UR-ACNC: ABNORMAL
LG PLATELETS BLD QL AUTO: SLIGHT — SIGNIFICANT CHANGE UP
LG PLATELETS BLD QL AUTO: SLIGHT — SIGNIFICANT CHANGE UP
LYMPHOCYTES # BLD AUTO: 0.82 K/UL — LOW (ref 1–3.3)
LYMPHOCYTES # BLD AUTO: 0.82 K/UL — LOW (ref 1–3.3)
LYMPHOCYTES # BLD AUTO: 1.57 K/UL — SIGNIFICANT CHANGE UP (ref 1–3.3)
LYMPHOCYTES # BLD AUTO: 1.57 K/UL — SIGNIFICANT CHANGE UP (ref 1–3.3)
LYMPHOCYTES # BLD AUTO: 1.74 K/UL — SIGNIFICANT CHANGE UP (ref 1–3.3)
LYMPHOCYTES # BLD AUTO: 1.76 K/UL — SIGNIFICANT CHANGE UP (ref 1–3.3)
LYMPHOCYTES # BLD AUTO: 10.2 % — LOW (ref 13–44)
LYMPHOCYTES # BLD AUTO: 10.2 % — LOW (ref 13–44)
LYMPHOCYTES # BLD AUTO: 11 % — LOW (ref 13–44)
LYMPHOCYTES # BLD AUTO: 12.2 % — LOW (ref 13–44)
LYMPHOCYTES # BLD AUTO: 2.05 K/UL — SIGNIFICANT CHANGE UP (ref 1–3.3)
LYMPHOCYTES # BLD AUTO: 2.1 K/UL — SIGNIFICANT CHANGE UP (ref 1–3.3)
LYMPHOCYTES # BLD AUTO: 2.94 K/UL — SIGNIFICANT CHANGE UP (ref 1–3.3)
LYMPHOCYTES # BLD AUTO: 2.94 K/UL — SIGNIFICANT CHANGE UP (ref 1–3.3)
LYMPHOCYTES # BLD AUTO: 22.7 % — SIGNIFICANT CHANGE UP (ref 13–44)
LYMPHOCYTES # BLD AUTO: 22.7 % — SIGNIFICANT CHANGE UP (ref 13–44)
LYMPHOCYTES # BLD AUTO: 31.7 % — SIGNIFICANT CHANGE UP (ref 13–44)
LYMPHOCYTES # BLD AUTO: 31.7 % — SIGNIFICANT CHANGE UP (ref 13–44)
LYMPHOCYTES # BLD AUTO: 9 % — LOW (ref 13–44)
LYMPHOCYTES # BLD AUTO: 9 % — LOW (ref 13–44)
M PNEUMO IGM SER-ACNC: 0.39 INDEX — SIGNIFICANT CHANGE UP (ref 0–0.9)
MACROCYTES BLD QL: SIGNIFICANT CHANGE UP
MACROCYTES BLD QL: SIGNIFICANT CHANGE UP
MACROCYTES BLD QL: SLIGHT — SIGNIFICANT CHANGE UP
MAGNESIUM SERPL-MCNC: 2.5 MG/DL — SIGNIFICANT CHANGE UP (ref 1.6–2.6)
MAGNESIUM SERPL-MCNC: 2.6 MG/DL — SIGNIFICANT CHANGE UP (ref 1.6–2.6)
MAGNESIUM SERPL-MCNC: 2.6 MG/DL — SIGNIFICANT CHANGE UP (ref 1.6–2.6)
MAGNESIUM SERPL-MCNC: 2.7 MG/DL — HIGH (ref 1.6–2.6)
MAGNESIUM SERPL-MCNC: 2.8 MG/DL — HIGH (ref 1.6–2.6)
MAGNESIUM SERPL-MCNC: 2.9 MG/DL — HIGH (ref 1.6–2.6)
MAGNESIUM SERPL-MCNC: 2.9 MG/DL — HIGH (ref 1.6–2.6)
MAGNESIUM SERPL-MCNC: 3 MG/DL — HIGH (ref 1.6–2.6)
MAGNESIUM SERPL-MCNC: 3 MG/DL — HIGH (ref 1.6–2.6)
MAGNESIUM SERPL-MCNC: 3.1 MG/DL — HIGH (ref 1.6–2.6)
MAGNESIUM SERPL-MCNC: 3.1 MG/DL — HIGH (ref 1.6–2.6)
MAGNESIUM SERPL-MCNC: 3.2 MG/DL — HIGH (ref 1.6–2.6)
MAGNESIUM SERPL-MCNC: 3.2 MG/DL — HIGH (ref 1.6–2.6)
MAGNESIUM SERPL-MCNC: 3.3 MG/DL — HIGH (ref 1.6–2.6)
MAGNESIUM SERPL-MCNC: 3.4 MG/DL — HIGH (ref 1.6–2.6)
MAGNESIUM SERPL-MCNC: 3.4 MG/DL — HIGH (ref 1.6–2.6)
MAGNESIUM SERPL-MCNC: 3.5 MG/DL — HIGH (ref 1.6–2.6)
MANUAL SMEAR VERIFICATION: SIGNIFICANT CHANGE UP
MCHC RBC-ENTMCNC: 28.9 G/DL — LOW (ref 32–36)
MCHC RBC-ENTMCNC: 28.9 G/DL — LOW (ref 32–36)
MCHC RBC-ENTMCNC: 29.6 G/DL — LOW (ref 32–36)
MCHC RBC-ENTMCNC: 30.2 G/DL — LOW (ref 32–36)
MCHC RBC-ENTMCNC: 30.3 G/DL — LOW (ref 32–36)
MCHC RBC-ENTMCNC: 30.3 G/DL — LOW (ref 32–36)
MCHC RBC-ENTMCNC: 30.3 PG — SIGNIFICANT CHANGE UP (ref 27–34)
MCHC RBC-ENTMCNC: 30.4 PG — SIGNIFICANT CHANGE UP (ref 27–34)
MCHC RBC-ENTMCNC: 30.5 G/DL — LOW (ref 32–36)
MCHC RBC-ENTMCNC: 30.6 PG — SIGNIFICANT CHANGE UP (ref 27–34)
MCHC RBC-ENTMCNC: 30.7 G/DL — LOW (ref 32–36)
MCHC RBC-ENTMCNC: 30.7 G/DL — LOW (ref 32–36)
MCHC RBC-ENTMCNC: 30.7 PG — SIGNIFICANT CHANGE UP (ref 27–34)
MCHC RBC-ENTMCNC: 30.8 G/DL — LOW (ref 32–36)
MCHC RBC-ENTMCNC: 30.9 PG — SIGNIFICANT CHANGE UP (ref 27–34)
MCHC RBC-ENTMCNC: 30.9 PG — SIGNIFICANT CHANGE UP (ref 27–34)
MCHC RBC-ENTMCNC: 31 G/DL — LOW (ref 32–36)
MCHC RBC-ENTMCNC: 31 G/DL — LOW (ref 32–36)
MCHC RBC-ENTMCNC: 31.1 G/DL — LOW (ref 32–36)
MCHC RBC-ENTMCNC: 31.1 PG — SIGNIFICANT CHANGE UP (ref 27–34)
MCHC RBC-ENTMCNC: 31.1 PG — SIGNIFICANT CHANGE UP (ref 27–34)
MCHC RBC-ENTMCNC: 31.2 G/DL — LOW (ref 32–36)
MCHC RBC-ENTMCNC: 31.2 G/DL — LOW (ref 32–36)
MCHC RBC-ENTMCNC: 31.2 PG — SIGNIFICANT CHANGE UP (ref 27–34)
MCHC RBC-ENTMCNC: 31.2 PG — SIGNIFICANT CHANGE UP (ref 27–34)
MCHC RBC-ENTMCNC: 31.3 PG — SIGNIFICANT CHANGE UP (ref 27–34)
MCHC RBC-ENTMCNC: 31.3 PG — SIGNIFICANT CHANGE UP (ref 27–34)
MCHC RBC-ENTMCNC: 31.5 PG — SIGNIFICANT CHANGE UP (ref 27–34)
MCHC RBC-ENTMCNC: 31.5 PG — SIGNIFICANT CHANGE UP (ref 27–34)
MCHC RBC-ENTMCNC: 31.6 G/DL — LOW (ref 32–36)
MCHC RBC-ENTMCNC: 31.6 PG — SIGNIFICANT CHANGE UP (ref 27–34)
MCHC RBC-ENTMCNC: 31.6 PG — SIGNIFICANT CHANGE UP (ref 27–34)
MCHC RBC-ENTMCNC: 31.8 PG — SIGNIFICANT CHANGE UP (ref 27–34)
MCHC RBC-ENTMCNC: 31.9 PG — SIGNIFICANT CHANGE UP (ref 27–34)
MCHC RBC-ENTMCNC: 32.2 G/DL — SIGNIFICANT CHANGE UP (ref 32–36)
MCHC RBC-ENTMCNC: 32.3 G/DL — SIGNIFICANT CHANGE UP (ref 32–36)
MCHC RBC-ENTMCNC: 32.5 G/DL — SIGNIFICANT CHANGE UP (ref 32–36)
MCHC RBC-ENTMCNC: 32.6 G/DL — SIGNIFICANT CHANGE UP (ref 32–36)
MCV RBC AUTO: 100.4 FL — HIGH (ref 80–100)
MCV RBC AUTO: 100.5 FL — HIGH (ref 80–100)
MCV RBC AUTO: 100.9 FL — HIGH (ref 80–100)
MCV RBC AUTO: 101.4 FL — HIGH (ref 80–100)
MCV RBC AUTO: 101.6 FL — HIGH (ref 80–100)
MCV RBC AUTO: 101.8 FL — HIGH (ref 80–100)
MCV RBC AUTO: 101.8 FL — HIGH (ref 80–100)
MCV RBC AUTO: 102.1 FL — HIGH (ref 80–100)
MCV RBC AUTO: 103.2 FL — HIGH (ref 80–100)
MCV RBC AUTO: 103.7 FL — HIGH (ref 80–100)
MCV RBC AUTO: 103.7 FL — HIGH (ref 80–100)
MCV RBC AUTO: 105.1 FL — HIGH (ref 80–100)
MCV RBC AUTO: 105.1 FL — HIGH (ref 80–100)
MCV RBC AUTO: 96.4 FL — SIGNIFICANT CHANGE UP (ref 80–100)
MCV RBC AUTO: 96.6 FL — SIGNIFICANT CHANGE UP (ref 80–100)
MCV RBC AUTO: 97.9 FL — SIGNIFICANT CHANGE UP (ref 80–100)
MCV RBC AUTO: 98.5 FL — SIGNIFICANT CHANGE UP (ref 80–100)
MCV RBC AUTO: 98.9 FL — SIGNIFICANT CHANGE UP (ref 80–100)
MCV RBC AUTO: 99.1 FL — SIGNIFICANT CHANGE UP (ref 80–100)
MCV RBC AUTO: 99.6 FL — SIGNIFICANT CHANGE UP (ref 80–100)
METAMYELOCYTES # FLD: 1 % — HIGH (ref 0–0)
METAMYELOCYTES # FLD: 2 % — HIGH (ref 0–0)
METAMYELOCYTES # FLD: 2 % — HIGH (ref 0–0)
MICROCYTES BLD QL: SLIGHT — SIGNIFICANT CHANGE UP
MONOCYTES # BLD AUTO: 0.31 K/UL — SIGNIFICANT CHANGE UP (ref 0–0.9)
MONOCYTES # BLD AUTO: 0.31 K/UL — SIGNIFICANT CHANGE UP (ref 0–0.9)
MONOCYTES # BLD AUTO: 0.86 K/UL — SIGNIFICANT CHANGE UP (ref 0–0.9)
MONOCYTES # BLD AUTO: 1.14 K/UL — HIGH (ref 0–0.9)
MONOCYTES # BLD AUTO: 1.28 K/UL — HIGH (ref 0–0.9)
MONOCYTES # BLD AUTO: 1.28 K/UL — HIGH (ref 0–0.9)
MONOCYTES # BLD AUTO: 1.31 K/UL — HIGH (ref 0–0.9)
MONOCYTES # BLD AUTO: 1.31 K/UL — HIGH (ref 0–0.9)
MONOCYTES # BLD AUTO: 1.6 K/UL — HIGH (ref 0–0.9)
MONOCYTES # BLD AUTO: 1.94 K/UL — HIGH (ref 0–0.9)
MONOCYTES NFR BLD AUTO: 10 % — SIGNIFICANT CHANGE UP (ref 2–14)
MONOCYTES NFR BLD AUTO: 10.1 % — SIGNIFICANT CHANGE UP (ref 2–14)
MONOCYTES NFR BLD AUTO: 10.1 % — SIGNIFICANT CHANGE UP (ref 2–14)
MONOCYTES NFR BLD AUTO: 11.2 % — SIGNIFICANT CHANGE UP (ref 2–14)
MONOCYTES NFR BLD AUTO: 14 % — SIGNIFICANT CHANGE UP (ref 2–14)
MONOCYTES NFR BLD AUTO: 14 % — SIGNIFICANT CHANGE UP (ref 2–14)
MONOCYTES NFR BLD AUTO: 5 % — SIGNIFICANT CHANGE UP (ref 2–14)
MONOCYTES NFR BLD AUTO: 5.6 % — SIGNIFICANT CHANGE UP (ref 2–14)
MONOCYTES NFR BLD AUTO: 6.3 % — SIGNIFICANT CHANGE UP (ref 2–14)
MONOCYTES NFR BLD AUTO: 6.3 % — SIGNIFICANT CHANGE UP (ref 2–14)
MRSA PCR RESULT.: SIGNIFICANT CHANGE UP
MRSA PCR RESULT.: SIGNIFICANT CHANGE UP
MYCOPLASMA AG SPEC QL: NEGATIVE — SIGNIFICANT CHANGE UP
MYELOCYTES NFR BLD: 2 % — HIGH (ref 0–0)
NEUTROPHILS # BLD AUTO: 12.14 K/UL — HIGH (ref 1.8–7.4)
NEUTROPHILS # BLD AUTO: 12.39 K/UL — HIGH (ref 1.8–7.4)
NEUTROPHILS # BLD AUTO: 13.9 K/UL — HIGH (ref 1.8–7.4)
NEUTROPHILS # BLD AUTO: 16.38 K/UL — HIGH (ref 1.8–7.4)
NEUTROPHILS # BLD AUTO: 3.05 K/UL — SIGNIFICANT CHANGE UP (ref 1.8–7.4)
NEUTROPHILS # BLD AUTO: 3.05 K/UL — SIGNIFICANT CHANGE UP (ref 1.8–7.4)
NEUTROPHILS # BLD AUTO: 6.11 K/UL — SIGNIFICANT CHANGE UP (ref 1.8–7.4)
NEUTROPHILS # BLD AUTO: 6.11 K/UL — SIGNIFICANT CHANGE UP (ref 1.8–7.4)
NEUTROPHILS # BLD AUTO: 8.36 K/UL — HIGH (ref 1.8–7.4)
NEUTROPHILS # BLD AUTO: 8.36 K/UL — HIGH (ref 1.8–7.4)
NEUTROPHILS NFR BLD AUTO: 37 % — LOW (ref 43–77)
NEUTROPHILS NFR BLD AUTO: 37 % — LOW (ref 43–77)
NEUTROPHILS NFR BLD AUTO: 61.4 % — SIGNIFICANT CHANGE UP (ref 43–77)
NEUTROPHILS NFR BLD AUTO: 61.4 % — SIGNIFICANT CHANGE UP (ref 43–77)
NEUTROPHILS NFR BLD AUTO: 64.5 % — SIGNIFICANT CHANGE UP (ref 43–77)
NEUTROPHILS NFR BLD AUTO: 64.5 % — SIGNIFICANT CHANGE UP (ref 43–77)
NEUTROPHILS NFR BLD AUTO: 71.8 % — SIGNIFICANT CHANGE UP (ref 43–77)
NEUTROPHILS NFR BLD AUTO: 73 % — SIGNIFICANT CHANGE UP (ref 43–77)
NEUTROPHILS NFR BLD AUTO: 81.2 % — HIGH (ref 43–77)
NEUTROPHILS NFR BLD AUTO: 81.6 % — HIGH (ref 43–77)
NEUTS BAND # BLD: 3 % — SIGNIFICANT CHANGE UP (ref 0–8)
NEUTS BAND # BLD: 30 % — HIGH (ref 0–8)
NEUTS BAND # BLD: 30 % — HIGH (ref 0–8)
NITRITE UR-MCNC: POSITIVE
NITRITE UR-MCNC: POSITIVE
NRBC # BLD: 0 /100 WBCS — SIGNIFICANT CHANGE UP (ref 0–0)
NRBC # BLD: 1 /100 WBCS — HIGH (ref 0–0)
NRBC # BLD: 2 /100 WBCS — HIGH (ref 0–0)
NRBC # BLD: 3 /100 WBCS — HIGH (ref 0–0)
NRBC # BLD: 4 /100 WBCS — HIGH (ref 0–0)
NRBC # BLD: 4 /100 WBCS — HIGH (ref 0–0)
NRBC # BLD: SIGNIFICANT CHANGE UP /100 WBCS (ref 0–0)
PCO2 BLDA: 47 MMHG — HIGH (ref 32–46)
PCO2 BLDV: 41 MMHG — LOW (ref 42–55)
PCO2 BLDV: 41 MMHG — LOW (ref 42–55)
PCO2 BLDV: 61 MMHG — HIGH (ref 42–55)
PCO2 BLDV: 61 MMHG — HIGH (ref 42–55)
PF4 HEPARIN CMPLX AB SER-ACNC: NEGATIVE — SIGNIFICANT CHANGE UP
PH BLDA: 7.56 — HIGH (ref 7.35–7.45)
PH BLDV: 7.39 — SIGNIFICANT CHANGE UP (ref 7.32–7.43)
PH BLDV: 7.39 — SIGNIFICANT CHANGE UP (ref 7.32–7.43)
PH BLDV: 7.49 — HIGH (ref 7.32–7.43)
PH BLDV: 7.49 — HIGH (ref 7.32–7.43)
PH UR: 6.5 — SIGNIFICANT CHANGE UP (ref 5–8)
PH UR: 6.5 — SIGNIFICANT CHANGE UP (ref 5–8)
PHOSPHATE SERPL-MCNC: 3.7 MG/DL — SIGNIFICANT CHANGE UP (ref 2.5–4.5)
PHOSPHATE SERPL-MCNC: 4 MG/DL — SIGNIFICANT CHANGE UP (ref 2.5–4.5)
PHOSPHATE SERPL-MCNC: 4 MG/DL — SIGNIFICANT CHANGE UP (ref 2.5–4.5)
PHOSPHATE SERPL-MCNC: 4.2 MG/DL — SIGNIFICANT CHANGE UP (ref 2.5–4.5)
PHOSPHATE SERPL-MCNC: 4.3 MG/DL — SIGNIFICANT CHANGE UP (ref 2.5–4.5)
PHOSPHATE SERPL-MCNC: 4.4 MG/DL — SIGNIFICANT CHANGE UP (ref 2.5–4.5)
PHOSPHATE SERPL-MCNC: 4.6 MG/DL — HIGH (ref 2.5–4.5)
PHOSPHATE SERPL-MCNC: 4.6 MG/DL — HIGH (ref 2.5–4.5)
PHOSPHATE SERPL-MCNC: 4.7 MG/DL — HIGH (ref 2.5–4.5)
PHOSPHATE SERPL-MCNC: 5.6 MG/DL — HIGH (ref 2.5–4.5)
PHOSPHATE SERPL-MCNC: 5.7 MG/DL — HIGH (ref 2.5–4.5)
PHOSPHATE SERPL-MCNC: 5.9 MG/DL — HIGH (ref 2.5–4.5)
PHOSPHATE SERPL-MCNC: 5.9 MG/DL — HIGH (ref 2.5–4.5)
PHOSPHATE SERPL-MCNC: 6 MG/DL — HIGH (ref 2.5–4.5)
PHOSPHATE SERPL-MCNC: 6.7 MG/DL — HIGH (ref 2.5–4.5)
PHOSPHATE SERPL-MCNC: 6.8 MG/DL — HIGH (ref 2.5–4.5)
PHOSPHATE SERPL-MCNC: 6.9 MG/DL — HIGH (ref 2.5–4.5)
PHOSPHATE SERPL-MCNC: 7.2 MG/DL — HIGH (ref 2.5–4.5)
PHOSPHATE SERPL-MCNC: 8 MG/DL — HIGH (ref 2.5–4.5)
PHOSPHATE SERPL-MCNC: 8 MG/DL — HIGH (ref 2.5–4.5)
PLAT MORPH BLD: ABNORMAL
PLAT MORPH BLD: ABNORMAL
PLAT MORPH BLD: NORMAL — SIGNIFICANT CHANGE UP
PLATELET # BLD AUTO: 1032 K/UL — CRITICAL HIGH (ref 150–400)
PLATELET # BLD AUTO: 1070 K/UL — CRITICAL HIGH (ref 150–400)
PLATELET # BLD AUTO: 1136 K/UL — CRITICAL HIGH (ref 150–400)
PLATELET # BLD AUTO: 1147 K/UL — CRITICAL HIGH (ref 150–400)
PLATELET # BLD AUTO: 118 K/UL — LOW (ref 150–400)
PLATELET # BLD AUTO: 148 K/UL — LOW (ref 150–400)
PLATELET # BLD AUTO: 148 K/UL — LOW (ref 150–400)
PLATELET # BLD AUTO: 161 K/UL — SIGNIFICANT CHANGE UP (ref 150–400)
PLATELET # BLD AUTO: 161 K/UL — SIGNIFICANT CHANGE UP (ref 150–400)
PLATELET # BLD AUTO: 162 K/UL — SIGNIFICANT CHANGE UP (ref 150–400)
PLATELET # BLD AUTO: 162 K/UL — SIGNIFICANT CHANGE UP (ref 150–400)
PLATELET # BLD AUTO: 174 K/UL — SIGNIFICANT CHANGE UP (ref 150–400)
PLATELET # BLD AUTO: 347 K/UL — SIGNIFICANT CHANGE UP (ref 150–400)
PLATELET # BLD AUTO: 367 K/UL — SIGNIFICANT CHANGE UP (ref 150–400)
PLATELET # BLD AUTO: 54 K/UL — LOW (ref 150–400)
PLATELET # BLD AUTO: 554 K/UL — HIGH (ref 150–400)
PLATELET # BLD AUTO: 59 K/UL — LOW (ref 150–400)
PLATELET # BLD AUTO: 738 K/UL — HIGH (ref 150–400)
PLATELET # BLD AUTO: 75 K/UL — LOW (ref 150–400)
PLATELET # BLD AUTO: 77 K/UL — LOW (ref 150–400)
PLATELET # BLD AUTO: 91 K/UL — LOW (ref 150–400)
PLATELET # BLD AUTO: 94 K/UL — LOW (ref 150–400)
PO2 BLDA: 162 MMHG — HIGH (ref 83–108)
PO2 BLDV: 38 MMHG — SIGNIFICANT CHANGE UP (ref 25–45)
PO2 BLDV: 38 MMHG — SIGNIFICANT CHANGE UP (ref 25–45)
PO2 BLDV: 41 MMHG — SIGNIFICANT CHANGE UP (ref 25–45)
PO2 BLDV: 41 MMHG — SIGNIFICANT CHANGE UP (ref 25–45)
POLYCHROMASIA BLD QL SMEAR: SLIGHT — SIGNIFICANT CHANGE UP
POTASSIUM BLDV-SCNC: 4.3 MMOL/L — SIGNIFICANT CHANGE UP (ref 3.5–5.1)
POTASSIUM BLDV-SCNC: 4.3 MMOL/L — SIGNIFICANT CHANGE UP (ref 3.5–5.1)
POTASSIUM BLDV-SCNC: 4.7 MMOL/L — SIGNIFICANT CHANGE UP (ref 3.5–5.1)
POTASSIUM BLDV-SCNC: 4.7 MMOL/L — SIGNIFICANT CHANGE UP (ref 3.5–5.1)
POTASSIUM SERPL-MCNC: 2.7 MMOL/L — CRITICAL LOW (ref 3.5–5.3)
POTASSIUM SERPL-MCNC: 2.9 MMOL/L — CRITICAL LOW (ref 3.5–5.3)
POTASSIUM SERPL-MCNC: 3.1 MMOL/L — LOW (ref 3.5–5.3)
POTASSIUM SERPL-MCNC: 3.2 MMOL/L — LOW (ref 3.5–5.3)
POTASSIUM SERPL-MCNC: 3.3 MMOL/L — LOW (ref 3.5–5.3)
POTASSIUM SERPL-MCNC: 3.4 MMOL/L — LOW (ref 3.5–5.3)
POTASSIUM SERPL-MCNC: 3.5 MMOL/L — SIGNIFICANT CHANGE UP (ref 3.5–5.3)
POTASSIUM SERPL-MCNC: 3.6 MMOL/L — SIGNIFICANT CHANGE UP (ref 3.5–5.3)
POTASSIUM SERPL-MCNC: 3.7 MMOL/L — SIGNIFICANT CHANGE UP (ref 3.5–5.3)
POTASSIUM SERPL-MCNC: 4 MMOL/L — SIGNIFICANT CHANGE UP (ref 3.5–5.3)
POTASSIUM SERPL-MCNC: 4.1 MMOL/L — SIGNIFICANT CHANGE UP (ref 3.5–5.3)
POTASSIUM SERPL-MCNC: 4.2 MMOL/L — SIGNIFICANT CHANGE UP (ref 3.5–5.3)
POTASSIUM SERPL-MCNC: 4.2 MMOL/L — SIGNIFICANT CHANGE UP (ref 3.5–5.3)
POTASSIUM SERPL-SCNC: 2.7 MMOL/L — CRITICAL LOW (ref 3.5–5.3)
POTASSIUM SERPL-SCNC: 2.9 MMOL/L — CRITICAL LOW (ref 3.5–5.3)
POTASSIUM SERPL-SCNC: 3.1 MMOL/L — LOW (ref 3.5–5.3)
POTASSIUM SERPL-SCNC: 3.2 MMOL/L — LOW (ref 3.5–5.3)
POTASSIUM SERPL-SCNC: 3.3 MMOL/L — LOW (ref 3.5–5.3)
POTASSIUM SERPL-SCNC: 3.4 MMOL/L — LOW (ref 3.5–5.3)
POTASSIUM SERPL-SCNC: 3.5 MMOL/L — SIGNIFICANT CHANGE UP (ref 3.5–5.3)
POTASSIUM SERPL-SCNC: 3.6 MMOL/L — SIGNIFICANT CHANGE UP (ref 3.5–5.3)
POTASSIUM SERPL-SCNC: 3.7 MMOL/L — SIGNIFICANT CHANGE UP (ref 3.5–5.3)
POTASSIUM SERPL-SCNC: 4 MMOL/L — SIGNIFICANT CHANGE UP (ref 3.5–5.3)
POTASSIUM SERPL-SCNC: 4.1 MMOL/L — SIGNIFICANT CHANGE UP (ref 3.5–5.3)
POTASSIUM SERPL-SCNC: 4.2 MMOL/L — SIGNIFICANT CHANGE UP (ref 3.5–5.3)
POTASSIUM SERPL-SCNC: 4.2 MMOL/L — SIGNIFICANT CHANGE UP (ref 3.5–5.3)
PROCALCITONIN SERPL-MCNC: 14.1 NG/ML — HIGH (ref 0.02–0.1)
PROCALCITONIN SERPL-MCNC: 2.81 NG/ML — HIGH (ref 0.02–0.1)
PROCALCITONIN SERPL-MCNC: 30.8 NG/ML — HIGH (ref 0.02–0.1)
PROCALCITONIN SERPL-MCNC: 30.8 NG/ML — HIGH (ref 0.02–0.1)
PROT SERPL-MCNC: 4.7 GM/DL — LOW (ref 6–8.3)
PROT SERPL-MCNC: 4.7 GM/DL — LOW (ref 6–8.3)
PROT SERPL-MCNC: 5.2 GM/DL — LOW (ref 6–8.3)
PROT SERPL-MCNC: 5.4 GM/DL — LOW (ref 6–8.3)
PROT SERPL-MCNC: 5.6 GM/DL — LOW (ref 6–8.3)
PROT SERPL-MCNC: 5.6 GM/DL — LOW (ref 6–8.3)
PROT SERPL-MCNC: 5.8 GM/DL — LOW (ref 6–8.3)
PROT SERPL-MCNC: 5.9 GM/DL — LOW (ref 6–8.3)
PROT SERPL-MCNC: 6 GM/DL — SIGNIFICANT CHANGE UP (ref 6–8.3)
PROT SERPL-MCNC: 6.2 GM/DL — SIGNIFICANT CHANGE UP (ref 6–8.3)
PROT SERPL-MCNC: 6.5 GM/DL — SIGNIFICANT CHANGE UP (ref 6–8.3)
PROT SERPL-MCNC: 6.5 GM/DL — SIGNIFICANT CHANGE UP (ref 6–8.3)
PROT UR-MCNC: 30 MG/DL
PROT UR-MCNC: 30 MG/DL
PROTHROM AB SERPL-ACNC: 12.6 SEC — SIGNIFICANT CHANGE UP (ref 9.5–13)
PROTHROM AB SERPL-ACNC: 12.6 SEC — SIGNIFICANT CHANGE UP (ref 9.5–13)
PROTHROM AB SERPL-ACNC: 14.1 SEC — HIGH (ref 9.5–13)
PROTHROM AB SERPL-ACNC: 14.1 SEC — HIGH (ref 9.5–13)
RAPID RVP RESULT: SIGNIFICANT CHANGE UP
RAPID RVP RESULT: SIGNIFICANT CHANGE UP
RBC # BLD: 2.13 M/UL — LOW (ref 4.2–5.8)
RBC # BLD: 2.14 M/UL — LOW (ref 4.2–5.8)
RBC # BLD: 2.14 M/UL — LOW (ref 4.2–5.8)
RBC # BLD: 2.16 M/UL — LOW (ref 4.2–5.8)
RBC # BLD: 2.18 M/UL — LOW (ref 4.2–5.8)
RBC # BLD: 2.25 M/UL — LOW (ref 4.2–5.8)
RBC # BLD: 2.3 M/UL — LOW (ref 4.2–5.8)
RBC # BLD: 2.33 M/UL — LOW (ref 4.2–5.8)
RBC # BLD: 2.34 M/UL — LOW (ref 4.2–5.8)
RBC # BLD: 2.34 M/UL — LOW (ref 4.2–5.8)
RBC # BLD: 2.41 M/UL — LOW (ref 4.2–5.8)
RBC # BLD: 2.51 M/UL — LOW (ref 4.2–5.8)
RBC # BLD: 2.58 M/UL — LOW (ref 4.2–5.8)
RBC # BLD: 2.6 M/UL — LOW (ref 4.2–5.8)
RBC # BLD: 2.63 M/UL — LOW (ref 4.2–5.8)
RBC # BLD: 2.64 M/UL — LOW (ref 4.2–5.8)
RBC # BLD: 2.85 M/UL — LOW (ref 4.2–5.8)
RBC # BLD: 2.85 M/UL — LOW (ref 4.2–5.8)
RBC # FLD: 17.6 % — HIGH (ref 10.3–14.5)
RBC # FLD: 17.7 % — HIGH (ref 10.3–14.5)
RBC # FLD: 17.9 % — HIGH (ref 10.3–14.5)
RBC # FLD: 18 % — HIGH (ref 10.3–14.5)
RBC # FLD: 18.1 % — HIGH (ref 10.3–14.5)
RBC # FLD: 18.2 % — HIGH (ref 10.3–14.5)
RBC # FLD: 18.2 % — HIGH (ref 10.3–14.5)
RBC # FLD: 18.5 % — HIGH (ref 10.3–14.5)
RBC # FLD: 18.5 % — HIGH (ref 10.3–14.5)
RBC # FLD: 18.6 % — HIGH (ref 10.3–14.5)
RBC # FLD: 18.8 % — HIGH (ref 10.3–14.5)
RBC # FLD: 18.8 % — HIGH (ref 10.3–14.5)
RBC # FLD: 18.9 % — HIGH (ref 10.3–14.5)
RBC # FLD: 19.2 % — HIGH (ref 10.3–14.5)
RBC # FLD: 19.6 % — HIGH (ref 10.3–14.5)
RBC BLD AUTO: ABNORMAL
RBC CASTS # UR COMP ASSIST: SIGNIFICANT CHANGE UP /HPF (ref 0–4)
RBC CASTS # UR COMP ASSIST: SIGNIFICANT CHANGE UP /HPF (ref 0–4)
S AUREUS DNA NOSE QL NAA+PROBE: SIGNIFICANT CHANGE UP
S AUREUS DNA NOSE QL NAA+PROBE: SIGNIFICANT CHANGE UP
S PNEUM AG UR QL: POSITIVE
S PNEUM AG UR QL: POSITIVE
SAO2 % BLDA: 99 % — HIGH (ref 94–98)
SAO2 % BLDV: 53.7 % — LOW (ref 94–98)
SAO2 % BLDV: 53.7 % — LOW (ref 94–98)
SAO2 % BLDV: 61.8 % — LOW (ref 94–98)
SAO2 % BLDV: 61.8 % — LOW (ref 94–98)
SARS-COV-2 RNA SPEC QL NAA+PROBE: SIGNIFICANT CHANGE UP
SODIUM SERPL-SCNC: 139 MMOL/L — SIGNIFICANT CHANGE UP (ref 135–145)
SODIUM SERPL-SCNC: 140 MMOL/L — SIGNIFICANT CHANGE UP (ref 135–145)
SODIUM SERPL-SCNC: 141 MMOL/L — SIGNIFICANT CHANGE UP (ref 135–145)
SODIUM SERPL-SCNC: 141 MMOL/L — SIGNIFICANT CHANGE UP (ref 135–145)
SODIUM SERPL-SCNC: 142 MMOL/L — SIGNIFICANT CHANGE UP (ref 135–145)
SODIUM SERPL-SCNC: 143 MMOL/L — SIGNIFICANT CHANGE UP (ref 135–145)
SODIUM SERPL-SCNC: 143 MMOL/L — SIGNIFICANT CHANGE UP (ref 135–145)
SODIUM SERPL-SCNC: 145 MMOL/L — SIGNIFICANT CHANGE UP (ref 135–145)
SODIUM SERPL-SCNC: 146 MMOL/L — HIGH (ref 135–145)
SODIUM SERPL-SCNC: 147 MMOL/L — HIGH (ref 135–145)
SODIUM SERPL-SCNC: 148 MMOL/L — HIGH (ref 135–145)
SP GR SPEC: 1.02 — SIGNIFICANT CHANGE UP (ref 1–1.03)
SP GR SPEC: 1.02 — SIGNIFICANT CHANGE UP (ref 1–1.03)
SPECIMEN SOURCE: SIGNIFICANT CHANGE UP
SPHEROCYTES BLD QL SMEAR: SLIGHT — SIGNIFICANT CHANGE UP
STOMATOCYTES BLD QL SMEAR: SLIGHT — SIGNIFICANT CHANGE UP
T3FREE SERPL-MCNC: 1.19 PG/ML — LOW (ref 2–4.4)
T3FREE SERPL-MCNC: 1.19 PG/ML — LOW (ref 2–4.4)
T4 FREE SERPL-MCNC: 0.9 NG/DL — SIGNIFICANT CHANGE UP (ref 0.9–1.8)
T4 FREE SERPL-MCNC: 0.9 NG/DL — SIGNIFICANT CHANGE UP (ref 0.9–1.8)
TARGETS BLD QL SMEAR: SLIGHT — SIGNIFICANT CHANGE UP
TOXIC GRANULES BLD QL SMEAR: PRESENT — SIGNIFICANT CHANGE UP
TOXIC GRANULES BLD QL SMEAR: PRESENT — SIGNIFICANT CHANGE UP
TROPONIN I, HIGH SENSITIVITY RESULT: 18.7 NG/L — SIGNIFICANT CHANGE UP
TROPONIN I, HIGH SENSITIVITY RESULT: 18.7 NG/L — SIGNIFICANT CHANGE UP
UNFRACTIONATED HEPARIN INTERPRETATION: SIGNIFICANT CHANGE UP
UNFRACTIONATED HEPARIN RESULT: NEGATIVE — SIGNIFICANT CHANGE UP
UNFRACTIONATED HEPARIN-HIGH DOSE: 0 % — SIGNIFICANT CHANGE UP
UNFRACTIONATED HEPARIN-LOW DOSE: 0 % — SIGNIFICANT CHANGE UP
UROBILINOGEN FLD QL: 1 MG/DL — SIGNIFICANT CHANGE UP (ref 0.2–1)
UROBILINOGEN FLD QL: 1 MG/DL — SIGNIFICANT CHANGE UP (ref 0.2–1)
VALPROATE FREE SERPL-MCNC: 57.9 MG/L — HIGH (ref 4.8–17.3)
VALPROATE SERPL-MCNC: 4 UG/ML — LOW (ref 50–100)
VALPROATE SERPL-MCNC: 87 UG/ML — SIGNIFICANT CHANGE UP (ref 50–100)
VALPROATE SERPL-MCNC: 87 UG/ML — SIGNIFICANT CHANGE UP (ref 50–100)
VALPROATE SERPL-MCNC: 93 UG/ML — SIGNIFICANT CHANGE UP (ref 50–100)
VALPROATE SERPL-MCNC: 93 UG/ML — SIGNIFICANT CHANGE UP (ref 50–100)
VARIANT LYMPHS # BLD: 6 % — SIGNIFICANT CHANGE UP (ref 0–6)
VARIANT LYMPHS # BLD: 6 % — SIGNIFICANT CHANGE UP (ref 0–6)
WBC # BLD: 12.96 K/UL — HIGH (ref 3.8–10.5)
WBC # BLD: 12.96 K/UL — HIGH (ref 3.8–10.5)
WBC # BLD: 13 K/UL — HIGH (ref 3.8–10.5)
WBC # BLD: 14.77 K/UL — HIGH (ref 3.8–10.5)
WBC # BLD: 15.97 K/UL — HIGH (ref 3.8–10.5)
WBC # BLD: 17.06 K/UL — HIGH (ref 3.8–10.5)
WBC # BLD: 17.26 K/UL — HIGH (ref 3.8–10.5)
WBC # BLD: 17.55 K/UL — HIGH (ref 3.8–10.5)
WBC # BLD: 18.1 K/UL — HIGH (ref 3.8–10.5)
WBC # BLD: 19.78 K/UL — HIGH (ref 3.8–10.5)
WBC # BLD: 20.14 K/UL — HIGH (ref 3.8–10.5)
WBC # BLD: 20.18 K/UL — HIGH (ref 3.8–10.5)
WBC # BLD: 21.86 K/UL — HIGH (ref 3.8–10.5)
WBC # BLD: 23.86 K/UL — HIGH (ref 3.8–10.5)
WBC # BLD: 24.69 K/UL — HIGH (ref 3.8–10.5)
WBC # BLD: 28.52 K/UL — HIGH (ref 3.8–10.5)
WBC # BLD: 28.59 K/UL — HIGH (ref 3.8–10.5)
WBC # BLD: 28.83 K/UL — HIGH (ref 3.8–10.5)
WBC # BLD: 4.96 K/UL — SIGNIFICANT CHANGE UP (ref 3.8–10.5)
WBC # BLD: 4.96 K/UL — SIGNIFICANT CHANGE UP (ref 3.8–10.5)
WBC # BLD: 9.12 K/UL — SIGNIFICANT CHANGE UP (ref 3.8–10.5)
WBC # BLD: 9.12 K/UL — SIGNIFICANT CHANGE UP (ref 3.8–10.5)
WBC # FLD AUTO: 12.96 K/UL — HIGH (ref 3.8–10.5)
WBC # FLD AUTO: 12.96 K/UL — HIGH (ref 3.8–10.5)
WBC # FLD AUTO: 13 K/UL — HIGH (ref 3.8–10.5)
WBC # FLD AUTO: 14.77 K/UL — HIGH (ref 3.8–10.5)
WBC # FLD AUTO: 15.97 K/UL — HIGH (ref 3.8–10.5)
WBC # FLD AUTO: 17.06 K/UL — HIGH (ref 3.8–10.5)
WBC # FLD AUTO: 17.26 K/UL — HIGH (ref 3.8–10.5)
WBC # FLD AUTO: 17.55 K/UL — HIGH (ref 3.8–10.5)
WBC # FLD AUTO: 18.1 K/UL — HIGH (ref 3.8–10.5)
WBC # FLD AUTO: 19.78 K/UL — HIGH (ref 3.8–10.5)
WBC # FLD AUTO: 20.14 K/UL — HIGH (ref 3.8–10.5)
WBC # FLD AUTO: 20.18 K/UL — HIGH (ref 3.8–10.5)
WBC # FLD AUTO: 21.86 K/UL — HIGH (ref 3.8–10.5)
WBC # FLD AUTO: 23.86 K/UL — HIGH (ref 3.8–10.5)
WBC # FLD AUTO: 24.69 K/UL — HIGH (ref 3.8–10.5)
WBC # FLD AUTO: 28.52 K/UL — HIGH (ref 3.8–10.5)
WBC # FLD AUTO: 28.59 K/UL — HIGH (ref 3.8–10.5)
WBC # FLD AUTO: 28.83 K/UL — HIGH (ref 3.8–10.5)
WBC # FLD AUTO: 4.96 K/UL — SIGNIFICANT CHANGE UP (ref 3.8–10.5)
WBC # FLD AUTO: 4.96 K/UL — SIGNIFICANT CHANGE UP (ref 3.8–10.5)
WBC # FLD AUTO: 9.12 K/UL — SIGNIFICANT CHANGE UP (ref 3.8–10.5)
WBC # FLD AUTO: 9.12 K/UL — SIGNIFICANT CHANGE UP (ref 3.8–10.5)
WBC UR QL: SIGNIFICANT CHANGE UP /HPF (ref 0–5)
WBC UR QL: SIGNIFICANT CHANGE UP /HPF (ref 0–5)

## 2024-01-01 PROCEDURE — 99291 CRITICAL CARE FIRST HOUR: CPT

## 2024-01-01 PROCEDURE — 93306 TTE W/DOPPLER COMPLETE: CPT | Mod: 26

## 2024-01-01 PROCEDURE — 71250 CT THORAX DX C-: CPT | Mod: 26,MA

## 2024-01-01 PROCEDURE — 31500 INSERT EMERGENCY AIRWAY: CPT

## 2024-01-01 PROCEDURE — 31624 DX BRONCHOSCOPE/LAVAGE: CPT

## 2024-01-01 PROCEDURE — 70450 CT HEAD/BRAIN W/O DYE: CPT | Mod: 26

## 2024-01-01 PROCEDURE — 71045 X-RAY EXAM CHEST 1 VIEW: CPT | Mod: 26,77

## 2024-01-01 PROCEDURE — 93010 ELECTROCARDIOGRAM REPORT: CPT

## 2024-01-01 PROCEDURE — 99233 SBSQ HOSP IP/OBS HIGH 50: CPT

## 2024-01-01 PROCEDURE — 99497 ADVNCD CARE PLAN 30 MIN: CPT | Mod: 25

## 2024-01-01 PROCEDURE — 99232 SBSQ HOSP IP/OBS MODERATE 35: CPT

## 2024-01-01 PROCEDURE — 71045 X-RAY EXAM CHEST 1 VIEW: CPT | Mod: 26,76

## 2024-01-01 PROCEDURE — 36620 INSERTION CATHETER ARTERY: CPT

## 2024-01-01 PROCEDURE — 74176 CT ABD & PELVIS W/O CONTRAST: CPT | Mod: 26,MA

## 2024-01-01 PROCEDURE — 99238 HOSP IP/OBS DSCHRG MGMT 30/<: CPT

## 2024-01-01 PROCEDURE — 99285 EMERGENCY DEPT VISIT HI MDM: CPT

## 2024-01-01 PROCEDURE — 71045 X-RAY EXAM CHEST 1 VIEW: CPT | Mod: 26

## 2024-01-01 PROCEDURE — 93308 TTE F-UP OR LMTD: CPT | Mod: 26

## 2024-01-01 PROCEDURE — 99292 CRITICAL CARE ADDL 30 MIN: CPT | Mod: 25

## 2024-01-01 PROCEDURE — 99223 1ST HOSP IP/OBS HIGH 75: CPT

## 2024-01-01 PROCEDURE — 70450 CT HEAD/BRAIN W/O DYE: CPT | Mod: 26,MA

## 2024-01-01 PROCEDURE — 76604 US EXAM CHEST: CPT | Mod: 26

## 2024-01-01 PROCEDURE — 99291 CRITICAL CARE FIRST HOUR: CPT | Mod: 25

## 2024-01-01 PROCEDURE — 95816 EEG AWAKE AND DROWSY: CPT | Mod: 26

## 2024-01-01 PROCEDURE — 76937 US GUIDE VASCULAR ACCESS: CPT | Mod: 26

## 2024-01-01 RX ORDER — MORPHINE SULFATE 50 MG/1
2 CAPSULE, EXTENDED RELEASE ORAL
Refills: 0 | Status: DISCONTINUED | OUTPATIENT
Start: 2024-01-01 | End: 2024-01-01

## 2024-01-01 RX ORDER — POTASSIUM CHLORIDE 20 MEQ
20 PACKET (EA) ORAL ONCE
Refills: 0 | Status: DISCONTINUED | OUTPATIENT
Start: 2024-01-01 | End: 2024-01-01

## 2024-01-01 RX ORDER — MIDODRINE HYDROCHLORIDE 2.5 MG/1
20 TABLET ORAL ONCE
Refills: 0 | Status: COMPLETED | OUTPATIENT
Start: 2024-01-01 | End: 2024-01-01

## 2024-01-01 RX ORDER — IPRATROPIUM/ALBUTEROL SULFATE 18-103MCG
3 AEROSOL WITH ADAPTER (GRAM) INHALATION EVERY 6 HOURS
Refills: 0 | Status: DISCONTINUED | OUTPATIENT
Start: 2024-01-01 | End: 2024-01-01

## 2024-01-01 RX ORDER — POTASSIUM CHLORIDE 20 MEQ
40 PACKET (EA) ORAL ONCE
Refills: 0 | Status: COMPLETED | OUTPATIENT
Start: 2024-01-01 | End: 2024-01-01

## 2024-01-01 RX ORDER — ALLOPURINOL 300 MG
100 TABLET ORAL
Refills: 0 | DISCHARGE

## 2024-01-01 RX ORDER — PROPOFOL 10 MG/ML
10 INJECTION, EMULSION INTRAVENOUS
Qty: 1000 | Refills: 0 | Status: DISCONTINUED | OUTPATIENT
Start: 2024-01-01 | End: 2024-01-01

## 2024-01-01 RX ORDER — PIPERACILLIN AND TAZOBACTAM 4; .5 G/20ML; G/20ML
3.38 INJECTION, POWDER, LYOPHILIZED, FOR SOLUTION INTRAVENOUS ONCE
Refills: 0 | Status: COMPLETED | OUTPATIENT
Start: 2024-01-01 | End: 2024-01-01

## 2024-01-01 RX ORDER — POTASSIUM CHLORIDE 20 MEQ
10 PACKET (EA) ORAL ONCE
Refills: 0 | Status: COMPLETED | OUTPATIENT
Start: 2024-01-01 | End: 2024-01-01

## 2024-01-01 RX ORDER — POTASSIUM CHLORIDE 20 MEQ
10 PACKET (EA) ORAL
Refills: 0 | Status: COMPLETED | OUTPATIENT
Start: 2024-01-01 | End: 2024-01-01

## 2024-01-01 RX ORDER — SODIUM CHLORIDE 9 MG/ML
4 INJECTION INTRAMUSCULAR; INTRAVENOUS; SUBCUTANEOUS EVERY 6 HOURS
Refills: 0 | Status: DISCONTINUED | OUTPATIENT
Start: 2024-01-01 | End: 2024-01-01

## 2024-01-01 RX ORDER — SODIUM CHLORIDE 9 MG/ML
4 INJECTION INTRAMUSCULAR; INTRAVENOUS; SUBCUTANEOUS EVERY 6 HOURS
Refills: 0 | Status: COMPLETED | OUTPATIENT
Start: 2024-01-01 | End: 2024-01-01

## 2024-01-01 RX ORDER — ASPIRIN/CALCIUM CARB/MAGNESIUM 324 MG
0 TABLET ORAL
Refills: 0 | DISCHARGE

## 2024-01-01 RX ORDER — LACOSAMIDE 50 MG/1
1 TABLET ORAL
Refills: 0 | DISCHARGE

## 2024-01-01 RX ORDER — ACETAMINOPHEN 500 MG
1000 TABLET ORAL ONCE
Refills: 0 | Status: COMPLETED | OUTPATIENT
Start: 2024-01-01 | End: 2024-01-01

## 2024-01-01 RX ORDER — CHLORHEXIDINE GLUCONATE 213 G/1000ML
1 SOLUTION TOPICAL
Refills: 0 | Status: DISCONTINUED | OUTPATIENT
Start: 2024-01-01 | End: 2024-01-01

## 2024-01-01 RX ORDER — SODIUM CHLORIDE 9 MG/ML
1000 INJECTION, SOLUTION INTRAVENOUS ONCE
Refills: 0 | Status: COMPLETED | OUTPATIENT
Start: 2024-01-01 | End: 2024-01-01

## 2024-01-01 RX ORDER — POTASSIUM CHLORIDE 20 MEQ
20 PACKET (EA) ORAL
Refills: 0 | Status: COMPLETED | OUTPATIENT
Start: 2024-01-01 | End: 2024-01-01

## 2024-01-01 RX ORDER — VASOPRESSIN 20 [USP'U]/ML
0.04 INJECTION INTRAVENOUS
Qty: 40 | Refills: 0 | Status: DISCONTINUED | OUTPATIENT
Start: 2024-01-01 | End: 2024-01-01

## 2024-01-01 RX ORDER — AZITHROMYCIN 500 MG/1
500 TABLET, FILM COATED ORAL EVERY 24 HOURS
Refills: 0 | Status: DISCONTINUED | OUTPATIENT
Start: 2024-01-01 | End: 2024-01-01

## 2024-01-01 RX ORDER — HYDROCHLOROTHIAZIDE 25 MG
1 TABLET ORAL
Refills: 0 | DISCHARGE

## 2024-01-01 RX ORDER — POTASSIUM CHLORIDE 20 MEQ
40 PACKET (EA) ORAL EVERY 4 HOURS
Refills: 0 | Status: COMPLETED | OUTPATIENT
Start: 2024-01-01 | End: 2024-01-01

## 2024-01-01 RX ORDER — SODIUM CHLORIDE 9 MG/ML
500 INJECTION, SOLUTION INTRAVENOUS ONCE
Refills: 0 | Status: COMPLETED | OUTPATIENT
Start: 2024-01-01 | End: 2024-01-01

## 2024-01-01 RX ORDER — DIVALPROEX SODIUM 500 MG/1
1250 TABLET, DELAYED RELEASE ORAL
Refills: 0 | Status: DISCONTINUED | OUTPATIENT
Start: 2024-01-01 | End: 2024-01-01

## 2024-01-01 RX ORDER — POTASSIUM CHLORIDE 20 MEQ
20 PACKET (EA) ORAL ONCE
Refills: 0 | Status: COMPLETED | OUTPATIENT
Start: 2024-01-01 | End: 2024-01-01

## 2024-01-01 RX ORDER — BUMETANIDE 0.25 MG/ML
2 INJECTION INTRAMUSCULAR; INTRAVENOUS ONCE
Refills: 0 | Status: COMPLETED | OUTPATIENT
Start: 2024-01-01 | End: 2024-01-01

## 2024-01-01 RX ORDER — METOPROLOL TARTRATE 50 MG
2.5 TABLET ORAL ONCE
Refills: 0 | Status: COMPLETED | OUTPATIENT
Start: 2024-01-01 | End: 2024-01-01

## 2024-01-01 RX ORDER — LATANOPROST 0.05 MG/ML
1 SOLUTION/ DROPS OPHTHALMIC; TOPICAL
Refills: 0 | DISCHARGE

## 2024-01-01 RX ORDER — DIVALPROEX SODIUM 500 MG/1
2 TABLET, DELAYED RELEASE ORAL
Refills: 0 | DISCHARGE

## 2024-01-01 RX ORDER — ASPIRIN/CALCIUM CARB/MAGNESIUM 324 MG
1 TABLET ORAL
Refills: 0 | DISCHARGE

## 2024-01-01 RX ORDER — ROCURONIUM BROMIDE 10 MG/ML
50 VIAL (ML) INTRAVENOUS ONCE
Refills: 0 | Status: COMPLETED | OUTPATIENT
Start: 2024-01-01 | End: 2024-01-01

## 2024-01-01 RX ORDER — LACTULOSE 10 G/15ML
10 SOLUTION ORAL EVERY 8 HOURS
Refills: 0 | Status: DISCONTINUED | OUTPATIENT
Start: 2024-01-01 | End: 2024-01-01

## 2024-01-01 RX ORDER — LACOSAMIDE 50 MG/1
150 TABLET ORAL
Refills: 0 | Status: DISCONTINUED | OUTPATIENT
Start: 2024-01-01 | End: 2024-01-01

## 2024-01-01 RX ORDER — DEXTROSE 50 % IN WATER 50 %
50 SYRINGE (ML) INTRAVENOUS ONCE
Refills: 0 | Status: COMPLETED | OUTPATIENT
Start: 2024-01-01 | End: 2024-01-01

## 2024-01-01 RX ORDER — FENTANYL CITRATE 50 UG/ML
100 INJECTION INTRAVENOUS ONCE
Refills: 0 | Status: DISCONTINUED | OUTPATIENT
Start: 2024-01-01 | End: 2024-01-01

## 2024-01-01 RX ORDER — NOREPINEPHRINE BITARTRATE/D5W 8 MG/250ML
0.05 PLASTIC BAG, INJECTION (ML) INTRAVENOUS
Qty: 8 | Refills: 0 | Status: DISCONTINUED | OUTPATIENT
Start: 2024-01-01 | End: 2024-01-01

## 2024-01-01 RX ORDER — SODIUM CHLORIDE 9 MG/ML
1000 INJECTION, SOLUTION INTRAVENOUS
Refills: 0 | Status: DISCONTINUED | OUTPATIENT
Start: 2024-01-01 | End: 2024-01-01

## 2024-01-01 RX ORDER — ISOSORBIDE MONONITRATE 60 MG/1
1 TABLET, EXTENDED RELEASE ORAL
Refills: 0 | DISCHARGE

## 2024-01-01 RX ORDER — PANTOPRAZOLE SODIUM 20 MG/1
40 TABLET, DELAYED RELEASE ORAL DAILY
Refills: 0 | Status: DISCONTINUED | OUTPATIENT
Start: 2024-01-01 | End: 2024-01-01

## 2024-01-01 RX ORDER — CEFTRIAXONE 500 MG/1
INJECTION, POWDER, FOR SOLUTION INTRAMUSCULAR; INTRAVENOUS
Refills: 0 | Status: COMPLETED | OUTPATIENT
Start: 2024-01-01 | End: 2024-01-01

## 2024-01-01 RX ORDER — CEFTRIAXONE 500 MG/1
1000 INJECTION, POWDER, FOR SOLUTION INTRAMUSCULAR; INTRAVENOUS EVERY 24 HOURS
Refills: 0 | Status: COMPLETED | OUTPATIENT
Start: 2024-01-01 | End: 2024-01-01

## 2024-01-01 RX ORDER — ACETAZOLAMIDE 250 MG/1
500 TABLET ORAL ONCE
Refills: 0 | Status: COMPLETED | OUTPATIENT
Start: 2024-01-01 | End: 2024-01-01

## 2024-01-01 RX ORDER — NIFEDIPINE 30 MG
1 TABLET, EXTENDED RELEASE 24 HR ORAL
Refills: 0 | DISCHARGE

## 2024-01-01 RX ORDER — POTASSIUM CHLORIDE 20 MEQ
10 PACKET (EA) ORAL
Refills: 0 | Status: DISCONTINUED | OUTPATIENT
Start: 2024-01-01 | End: 2024-01-01

## 2024-01-01 RX ORDER — MIDODRINE HYDROCHLORIDE 2.5 MG/1
20 TABLET ORAL THREE TIMES A DAY
Refills: 0 | Status: DISCONTINUED | OUTPATIENT
Start: 2024-01-01 | End: 2024-01-01

## 2024-01-01 RX ORDER — THIAMINE MONONITRATE (VIT B1) 100 MG
100 TABLET ORAL ONCE
Refills: 0 | Status: COMPLETED | OUTPATIENT
Start: 2024-01-01 | End: 2024-01-01

## 2024-01-01 RX ORDER — ASPIRIN/CALCIUM CARB/MAGNESIUM 324 MG
81 TABLET ORAL DAILY
Refills: 0 | Status: DISCONTINUED | OUTPATIENT
Start: 2024-01-01 | End: 2024-01-01

## 2024-01-01 RX ORDER — SODIUM CHLORIDE 9 MG/ML
10 INJECTION INTRAMUSCULAR; INTRAVENOUS; SUBCUTANEOUS
Refills: 0 | Status: DISCONTINUED | OUTPATIENT
Start: 2024-01-01 | End: 2024-01-01

## 2024-01-01 RX ORDER — BUMETANIDE 0.25 MG/ML
4 INJECTION INTRAMUSCULAR; INTRAVENOUS
Qty: 20 | Refills: 0 | Status: DISCONTINUED | OUTPATIENT
Start: 2024-01-01 | End: 2024-01-01

## 2024-01-01 RX ORDER — LATANOPROST 0.05 MG/ML
1 SOLUTION/ DROPS OPHTHALMIC; TOPICAL AT BEDTIME
Refills: 0 | Status: DISCONTINUED | OUTPATIENT
Start: 2024-01-01 | End: 2024-01-01

## 2024-01-01 RX ORDER — SODIUM CHLORIDE 9 MG/ML
2300 INJECTION, SOLUTION INTRAVENOUS ONCE
Refills: 0 | Status: COMPLETED | OUTPATIENT
Start: 2024-01-01 | End: 2024-01-01

## 2024-01-01 RX ORDER — CEFTRIAXONE 500 MG/1
1000 INJECTION, POWDER, FOR SOLUTION INTRAMUSCULAR; INTRAVENOUS ONCE
Refills: 0 | Status: COMPLETED | OUTPATIENT
Start: 2024-01-01 | End: 2024-01-01

## 2024-01-01 RX ORDER — ALBUMIN HUMAN 25 %
50 VIAL (ML) INTRAVENOUS ONCE
Refills: 0 | Status: COMPLETED | OUTPATIENT
Start: 2024-01-01 | End: 2024-01-01

## 2024-01-01 RX ORDER — HEPARIN SODIUM 5000 [USP'U]/ML
5000 INJECTION INTRAVENOUS; SUBCUTANEOUS EVERY 12 HOURS
Refills: 0 | Status: DISCONTINUED | OUTPATIENT
Start: 2024-01-01 | End: 2024-01-01

## 2024-01-01 RX ORDER — HEPARIN SODIUM 5000 [USP'U]/ML
5000 INJECTION INTRAVENOUS; SUBCUTANEOUS EVERY 8 HOURS
Refills: 0 | Status: DISCONTINUED | OUTPATIENT
Start: 2024-01-01 | End: 2024-01-01

## 2024-01-01 RX ORDER — CLOPIDOGREL BISULFATE 75 MG/1
1 TABLET, FILM COATED ORAL
Refills: 0 | DISCHARGE

## 2024-01-01 RX ORDER — NOREPINEPHRINE BITARTRATE/D5W 8 MG/250ML
0.05 PLASTIC BAG, INJECTION (ML) INTRAVENOUS
Qty: 16 | Refills: 0 | Status: DISCONTINUED | OUTPATIENT
Start: 2024-01-01 | End: 2024-01-01

## 2024-01-01 RX ORDER — BUMETANIDE 0.25 MG/ML
1 INJECTION INTRAMUSCULAR; INTRAVENOUS
Refills: 0 | Status: DISCONTINUED | OUTPATIENT
Start: 2024-01-01 | End: 2024-01-01

## 2024-01-01 RX ORDER — ACETAMINOPHEN 500 MG
650 TABLET ORAL EVERY 6 HOURS
Refills: 0 | Status: DISCONTINUED | OUTPATIENT
Start: 2024-01-01 | End: 2024-01-01

## 2024-01-01 RX ORDER — POLYETHYLENE GLYCOL 3350 17 G/17G
17 POWDER, FOR SOLUTION ORAL DAILY
Refills: 0 | Status: DISCONTINUED | OUTPATIENT
Start: 2024-01-01 | End: 2024-01-01

## 2024-01-01 RX ORDER — VALPROIC ACID (AS SODIUM SALT) 250 MG/5ML
1250 SOLUTION, ORAL ORAL
Refills: 0 | Status: DISCONTINUED | OUTPATIENT
Start: 2024-01-01 | End: 2024-01-01

## 2024-01-01 RX ORDER — VANCOMYCIN HCL 1 G
1250 VIAL (EA) INTRAVENOUS ONCE
Refills: 0 | Status: COMPLETED | OUTPATIENT
Start: 2024-01-01 | End: 2024-01-01

## 2024-01-01 RX ORDER — MIDAZOLAM HYDROCHLORIDE 1 MG/ML
4 INJECTION, SOLUTION INTRAMUSCULAR; INTRAVENOUS ONCE
Refills: 0 | Status: DISCONTINUED | OUTPATIENT
Start: 2024-01-01 | End: 2024-01-01

## 2024-01-01 RX ORDER — CHLORHEXIDINE GLUCONATE 213 G/1000ML
15 SOLUTION TOPICAL
Refills: 0 | Status: DISCONTINUED | OUTPATIENT
Start: 2024-01-01 | End: 2024-01-01

## 2024-01-01 RX ORDER — CHLORHEXIDINE GLUCONATE 213 G/1000ML
15 SOLUTION TOPICAL EVERY 12 HOURS
Refills: 0 | Status: DISCONTINUED | OUTPATIENT
Start: 2024-01-01 | End: 2024-01-01

## 2024-01-01 RX ORDER — LACTULOSE 10 G/15ML
20 SOLUTION ORAL EVERY 8 HOURS
Refills: 0 | Status: DISCONTINUED | OUTPATIENT
Start: 2024-01-01 | End: 2024-01-01

## 2024-01-01 RX ADMIN — SODIUM CHLORIDE 4 MILLILITER(S): 9 INJECTION INTRAMUSCULAR; INTRAVENOUS; SUBCUTANEOUS at 05:10

## 2024-01-01 RX ADMIN — LATANOPROST 1 DROP(S): 0.05 SOLUTION/ DROPS OPHTHALMIC; TOPICAL at 21:47

## 2024-01-01 RX ADMIN — Medication 1 MILLIGRAM(S): at 22:27

## 2024-01-01 RX ADMIN — AZITHROMYCIN 255 MILLIGRAM(S): 500 TABLET, FILM COATED ORAL at 17:44

## 2024-01-01 RX ADMIN — Medication 7.23 MICROGRAM(S)/KG/MIN: at 11:54

## 2024-01-01 RX ADMIN — VASOPRESSIN 6 UNIT(S)/MIN: 20 INJECTION INTRAVENOUS at 10:08

## 2024-01-01 RX ADMIN — Medication 3 MILLILITER(S): at 17:09

## 2024-01-01 RX ADMIN — Medication 81 MILLIGRAM(S): at 11:54

## 2024-01-01 RX ADMIN — CHLORHEXIDINE GLUCONATE 15 MILLILITER(S): 213 SOLUTION TOPICAL at 05:47

## 2024-01-01 RX ADMIN — HEPARIN SODIUM 5000 UNIT(S): 5000 INJECTION INTRAVENOUS; SUBCUTANEOUS at 05:27

## 2024-01-01 RX ADMIN — LACTULOSE 10 GRAM(S): 10 SOLUTION ORAL at 05:40

## 2024-01-01 RX ADMIN — LACTULOSE 10 GRAM(S): 10 SOLUTION ORAL at 05:18

## 2024-01-01 RX ADMIN — Medication 3 MILLILITER(S): at 00:14

## 2024-01-01 RX ADMIN — SODIUM CHLORIDE 4 MILLILITER(S): 9 INJECTION INTRAMUSCULAR; INTRAVENOUS; SUBCUTANEOUS at 23:21

## 2024-01-01 RX ADMIN — HEPARIN SODIUM 5000 UNIT(S): 5000 INJECTION INTRAVENOUS; SUBCUTANEOUS at 21:50

## 2024-01-01 RX ADMIN — MORPHINE SULFATE 2 MILLIGRAM(S): 50 CAPSULE, EXTENDED RELEASE ORAL at 15:15

## 2024-01-01 RX ADMIN — SODIUM CHLORIDE 4 MILLILITER(S): 9 INJECTION INTRAMUSCULAR; INTRAVENOUS; SUBCUTANEOUS at 11:41

## 2024-01-01 RX ADMIN — Medication 1250 MILLIGRAM(S): at 19:23

## 2024-01-01 RX ADMIN — MIDODRINE HYDROCHLORIDE 20 MILLIGRAM(S): 2.5 TABLET ORAL at 11:56

## 2024-01-01 RX ADMIN — HEPARIN SODIUM 5000 UNIT(S): 5000 INJECTION INTRAVENOUS; SUBCUTANEOUS at 05:37

## 2024-01-01 RX ADMIN — PIPERACILLIN AND TAZOBACTAM 200 GRAM(S): 4; .5 INJECTION, POWDER, LYOPHILIZED, FOR SOLUTION INTRAVENOUS at 10:51

## 2024-01-01 RX ADMIN — Medication 3 MILLILITER(S): at 05:04

## 2024-01-01 RX ADMIN — CHLORHEXIDINE GLUCONATE 1 APPLICATION(S): 213 SOLUTION TOPICAL at 05:41

## 2024-01-01 RX ADMIN — HEPARIN SODIUM 5000 UNIT(S): 5000 INJECTION INTRAVENOUS; SUBCUTANEOUS at 05:40

## 2024-01-01 RX ADMIN — Medication 3 MILLILITER(S): at 00:32

## 2024-01-01 RX ADMIN — CEFTRIAXONE 100 MILLIGRAM(S): 500 INJECTION, POWDER, FOR SOLUTION INTRAMUSCULAR; INTRAVENOUS at 18:39

## 2024-01-01 RX ADMIN — SODIUM CHLORIDE 4 MILLILITER(S): 9 INJECTION INTRAMUSCULAR; INTRAVENOUS; SUBCUTANEOUS at 17:03

## 2024-01-01 RX ADMIN — MIDODRINE HYDROCHLORIDE 20 MILLIGRAM(S): 2.5 TABLET ORAL at 05:41

## 2024-01-01 RX ADMIN — Medication 3 MILLILITER(S): at 05:24

## 2024-01-01 RX ADMIN — LATANOPROST 1 DROP(S): 0.05 SOLUTION/ DROPS OPHTHALMIC; TOPICAL at 21:52

## 2024-01-01 RX ADMIN — MIDODRINE HYDROCHLORIDE 20 MILLIGRAM(S): 2.5 TABLET ORAL at 15:27

## 2024-01-01 RX ADMIN — Medication 3 MILLILITER(S): at 05:17

## 2024-01-01 RX ADMIN — CHLORHEXIDINE GLUCONATE 15 MILLILITER(S): 213 SOLUTION TOPICAL at 05:06

## 2024-01-01 RX ADMIN — SODIUM CHLORIDE 4 MILLILITER(S): 9 INJECTION INTRAMUSCULAR; INTRAVENOUS; SUBCUTANEOUS at 11:44

## 2024-01-01 RX ADMIN — SODIUM CHLORIDE 4 MILLILITER(S): 9 INJECTION INTRAMUSCULAR; INTRAVENOUS; SUBCUTANEOUS at 00:40

## 2024-01-01 RX ADMIN — VASOPRESSIN 6 UNIT(S)/MIN: 20 INJECTION INTRAVENOUS at 07:43

## 2024-01-01 RX ADMIN — SODIUM CHLORIDE 4 MILLILITER(S): 9 INJECTION INTRAMUSCULAR; INTRAVENOUS; SUBCUTANEOUS at 12:15

## 2024-01-01 RX ADMIN — LACOSAMIDE 150 MILLIGRAM(S): 50 TABLET ORAL at 05:40

## 2024-01-01 RX ADMIN — VASOPRESSIN 6 UNIT(S)/MIN: 20 INJECTION INTRAVENOUS at 19:42

## 2024-01-01 RX ADMIN — SODIUM CHLORIDE 4 MILLILITER(S): 9 INJECTION INTRAMUSCULAR; INTRAVENOUS; SUBCUTANEOUS at 17:56

## 2024-01-01 RX ADMIN — CHLORHEXIDINE GLUCONATE 15 MILLILITER(S): 213 SOLUTION TOPICAL at 05:09

## 2024-01-01 RX ADMIN — Medication 100 MILLIEQUIVALENT(S): at 23:10

## 2024-01-01 RX ADMIN — Medication 40 MILLIEQUIVALENT(S): at 03:27

## 2024-01-01 RX ADMIN — Medication 81 MILLIGRAM(S): at 11:34

## 2024-01-01 RX ADMIN — Medication 40 MILLIEQUIVALENT(S): at 05:30

## 2024-01-01 RX ADMIN — MIDODRINE HYDROCHLORIDE 20 MILLIGRAM(S): 2.5 TABLET ORAL at 17:08

## 2024-01-01 RX ADMIN — LACOSAMIDE 150 MILLIGRAM(S): 50 TABLET ORAL at 05:46

## 2024-01-01 RX ADMIN — Medication 3 MILLILITER(S): at 05:23

## 2024-01-01 RX ADMIN — Medication 81 MILLIGRAM(S): at 11:19

## 2024-01-01 RX ADMIN — CHLORHEXIDINE GLUCONATE 1 APPLICATION(S): 213 SOLUTION TOPICAL at 05:11

## 2024-01-01 RX ADMIN — SODIUM CHLORIDE 4 MILLILITER(S): 9 INJECTION INTRAMUSCULAR; INTRAVENOUS; SUBCUTANEOUS at 00:05

## 2024-01-01 RX ADMIN — MIDODRINE HYDROCHLORIDE 20 MILLIGRAM(S): 2.5 TABLET ORAL at 17:48

## 2024-01-01 RX ADMIN — Medication 100 MILLIEQUIVALENT(S): at 00:07

## 2024-01-01 RX ADMIN — POLYETHYLENE GLYCOL 3350 17 GRAM(S): 17 POWDER, FOR SOLUTION ORAL at 12:31

## 2024-01-01 RX ADMIN — HEPARIN SODIUM 5000 UNIT(S): 5000 INJECTION INTRAVENOUS; SUBCUTANEOUS at 21:05

## 2024-01-01 RX ADMIN — LATANOPROST 1 DROP(S): 0.05 SOLUTION/ DROPS OPHTHALMIC; TOPICAL at 21:57

## 2024-01-01 RX ADMIN — CHLORHEXIDINE GLUCONATE 1 APPLICATION(S): 213 SOLUTION TOPICAL at 05:59

## 2024-01-01 RX ADMIN — CHLORHEXIDINE GLUCONATE 1 APPLICATION(S): 213 SOLUTION TOPICAL at 05:47

## 2024-01-01 RX ADMIN — Medication 3 MILLILITER(S): at 11:19

## 2024-01-01 RX ADMIN — Medication 3 MILLILITER(S): at 17:14

## 2024-01-01 RX ADMIN — Medication 81 MILLIGRAM(S): at 11:17

## 2024-01-01 RX ADMIN — BUMETANIDE 20 MG/HR: 0.25 INJECTION INTRAMUSCULAR; INTRAVENOUS at 19:12

## 2024-01-01 RX ADMIN — Medication 3 MILLILITER(S): at 17:45

## 2024-01-01 RX ADMIN — CHLORHEXIDINE GLUCONATE 1 APPLICATION(S): 213 SOLUTION TOPICAL at 04:00

## 2024-01-01 RX ADMIN — SODIUM CHLORIDE 1000 MILLILITER(S): 9 INJECTION, SOLUTION INTRAVENOUS at 15:09

## 2024-01-01 RX ADMIN — HEPARIN SODIUM 5000 UNIT(S): 5000 INJECTION INTRAVENOUS; SUBCUTANEOUS at 05:06

## 2024-01-01 RX ADMIN — HEPARIN SODIUM 5000 UNIT(S): 5000 INJECTION INTRAVENOUS; SUBCUTANEOUS at 22:40

## 2024-01-01 RX ADMIN — SODIUM CHLORIDE 4 MILLILITER(S): 9 INJECTION INTRAMUSCULAR; INTRAVENOUS; SUBCUTANEOUS at 17:34

## 2024-01-01 RX ADMIN — Medication 100 MILLIEQUIVALENT(S): at 21:58

## 2024-01-01 RX ADMIN — Medication 100 MILLIEQUIVALENT(S): at 23:57

## 2024-01-01 RX ADMIN — SODIUM CHLORIDE 4 MILLILITER(S): 9 INJECTION INTRAMUSCULAR; INTRAVENOUS; SUBCUTANEOUS at 23:24

## 2024-01-01 RX ADMIN — Medication 3 MILLILITER(S): at 00:07

## 2024-01-01 RX ADMIN — LACOSAMIDE 150 MILLIGRAM(S): 50 TABLET ORAL at 05:42

## 2024-01-01 RX ADMIN — BUMETANIDE 10 MG/HR: 0.25 INJECTION INTRAMUSCULAR; INTRAVENOUS at 07:43

## 2024-01-01 RX ADMIN — Medication 81 MILLIGRAM(S): at 11:53

## 2024-01-01 RX ADMIN — SODIUM CHLORIDE 4 MILLILITER(S): 9 INJECTION INTRAMUSCULAR; INTRAVENOUS; SUBCUTANEOUS at 00:34

## 2024-01-01 RX ADMIN — Medication 3 MILLILITER(S): at 18:16

## 2024-01-01 RX ADMIN — POLYETHYLENE GLYCOL 3350 17 GRAM(S): 17 POWDER, FOR SOLUTION ORAL at 11:53

## 2024-01-01 RX ADMIN — MIDAZOLAM HYDROCHLORIDE 4 MILLIGRAM(S): 1 INJECTION, SOLUTION INTRAMUSCULAR; INTRAVENOUS at 16:48

## 2024-01-01 RX ADMIN — Medication 3.9 MICROGRAM(S)/KG/MIN: at 03:38

## 2024-01-01 RX ADMIN — HEPARIN SODIUM 5000 UNIT(S): 5000 INJECTION INTRAVENOUS; SUBCUTANEOUS at 13:23

## 2024-01-01 RX ADMIN — HEPARIN SODIUM 5000 UNIT(S): 5000 INJECTION INTRAVENOUS; SUBCUTANEOUS at 21:41

## 2024-01-01 RX ADMIN — CHLORHEXIDINE GLUCONATE 1 APPLICATION(S): 213 SOLUTION TOPICAL at 03:10

## 2024-01-01 RX ADMIN — Medication 1250 MILLIGRAM(S): at 05:10

## 2024-01-01 RX ADMIN — Medication 3 MILLILITER(S): at 11:11

## 2024-01-01 RX ADMIN — BUMETANIDE 10 MG/HR: 0.25 INJECTION INTRAMUSCULAR; INTRAVENOUS at 22:11

## 2024-01-01 RX ADMIN — Medication 3 MILLILITER(S): at 05:29

## 2024-01-01 RX ADMIN — HEPARIN SODIUM 5000 UNIT(S): 5000 INJECTION INTRAVENOUS; SUBCUTANEOUS at 01:36

## 2024-01-01 RX ADMIN — MORPHINE SULFATE 2 MILLIGRAM(S): 50 CAPSULE, EXTENDED RELEASE ORAL at 15:00

## 2024-01-01 RX ADMIN — Medication 40 MILLIEQUIVALENT(S): at 05:27

## 2024-01-01 RX ADMIN — HEPARIN SODIUM 5000 UNIT(S): 5000 INJECTION INTRAVENOUS; SUBCUTANEOUS at 05:09

## 2024-01-01 RX ADMIN — LATANOPROST 1 DROP(S): 0.05 SOLUTION/ DROPS OPHTHALMIC; TOPICAL at 22:21

## 2024-01-01 RX ADMIN — Medication 3 MILLILITER(S): at 05:05

## 2024-01-01 RX ADMIN — HEPARIN SODIUM 5000 UNIT(S): 5000 INJECTION INTRAVENOUS; SUBCUTANEOUS at 13:47

## 2024-01-01 RX ADMIN — SODIUM CHLORIDE 4 MILLILITER(S): 9 INJECTION INTRAMUSCULAR; INTRAVENOUS; SUBCUTANEOUS at 17:14

## 2024-01-01 RX ADMIN — BUMETANIDE 2 MILLIGRAM(S): 0.25 INJECTION INTRAMUSCULAR; INTRAVENOUS at 10:09

## 2024-01-01 RX ADMIN — SODIUM CHLORIDE 4 MILLILITER(S): 9 INJECTION INTRAMUSCULAR; INTRAVENOUS; SUBCUTANEOUS at 05:39

## 2024-01-01 RX ADMIN — SODIUM CHLORIDE 4 MILLILITER(S): 9 INJECTION INTRAMUSCULAR; INTRAVENOUS; SUBCUTANEOUS at 11:24

## 2024-01-01 RX ADMIN — LACOSAMIDE 130 MILLIGRAM(S): 50 TABLET ORAL at 22:17

## 2024-01-01 RX ADMIN — SODIUM CHLORIDE 4 MILLILITER(S): 9 INJECTION INTRAMUSCULAR; INTRAVENOUS; SUBCUTANEOUS at 11:29

## 2024-01-01 RX ADMIN — MORPHINE SULFATE 2 MILLIGRAM(S): 50 CAPSULE, EXTENDED RELEASE ORAL at 16:18

## 2024-01-01 RX ADMIN — Medication 3 MILLILITER(S): at 11:44

## 2024-01-01 RX ADMIN — Medication 40 MILLIEQUIVALENT(S): at 05:08

## 2024-01-01 RX ADMIN — SODIUM CHLORIDE 4 MILLILITER(S): 9 INJECTION INTRAMUSCULAR; INTRAVENOUS; SUBCUTANEOUS at 11:19

## 2024-01-01 RX ADMIN — Medication 81 MILLIGRAM(S): at 13:23

## 2024-01-01 RX ADMIN — SODIUM CHLORIDE 4 MILLILITER(S): 9 INJECTION INTRAMUSCULAR; INTRAVENOUS; SUBCUTANEOUS at 05:25

## 2024-01-01 RX ADMIN — BUMETANIDE 1 MILLIGRAM(S): 0.25 INJECTION INTRAMUSCULAR; INTRAVENOUS at 05:30

## 2024-01-01 RX ADMIN — Medication 3 MILLILITER(S): at 00:42

## 2024-01-01 RX ADMIN — Medication 400 MILLIGRAM(S): at 20:47

## 2024-01-01 RX ADMIN — Medication 650 MILLIGRAM(S): at 15:40

## 2024-01-01 RX ADMIN — LATANOPROST 1 DROP(S): 0.05 SOLUTION/ DROPS OPHTHALMIC; TOPICAL at 01:36

## 2024-01-01 RX ADMIN — MIDODRINE HYDROCHLORIDE 20 MILLIGRAM(S): 2.5 TABLET ORAL at 05:08

## 2024-01-01 RX ADMIN — Medication 650 MILLIGRAM(S): at 14:30

## 2024-01-01 RX ADMIN — SODIUM CHLORIDE 4 MILLILITER(S): 9 INJECTION INTRAMUSCULAR; INTRAVENOUS; SUBCUTANEOUS at 05:29

## 2024-01-01 RX ADMIN — VASOPRESSIN 6 UNIT(S)/MIN: 20 INJECTION INTRAVENOUS at 17:09

## 2024-01-01 RX ADMIN — Medication 3 MILLILITER(S): at 11:29

## 2024-01-01 RX ADMIN — Medication 50 MILLILITER(S): at 17:44

## 2024-01-01 RX ADMIN — HEPARIN SODIUM 5000 UNIT(S): 5000 INJECTION INTRAVENOUS; SUBCUTANEOUS at 22:22

## 2024-01-01 RX ADMIN — SODIUM CHLORIDE 4 MILLILITER(S): 9 INJECTION INTRAMUSCULAR; INTRAVENOUS; SUBCUTANEOUS at 11:11

## 2024-01-01 RX ADMIN — Medication 100 MILLIEQUIVALENT(S): at 06:08

## 2024-01-01 RX ADMIN — Medication 3.9 MICROGRAM(S)/KG/MIN: at 17:38

## 2024-01-01 RX ADMIN — VASOPRESSIN 6 UNIT(S)/MIN: 20 INJECTION INTRAVENOUS at 19:19

## 2024-01-01 RX ADMIN — Medication 3 MILLILITER(S): at 05:39

## 2024-01-01 RX ADMIN — LATANOPROST 1 DROP(S): 0.05 SOLUTION/ DROPS OPHTHALMIC; TOPICAL at 21:16

## 2024-01-01 RX ADMIN — ACETAZOLAMIDE 110 MILLIGRAM(S): 250 TABLET ORAL at 10:07

## 2024-01-01 RX ADMIN — Medication 3 MILLILITER(S): at 11:34

## 2024-01-01 RX ADMIN — Medication 40 MILLIEQUIVALENT(S): at 05:40

## 2024-01-01 RX ADMIN — HEPARIN SODIUM 5000 UNIT(S): 5000 INJECTION INTRAVENOUS; SUBCUTANEOUS at 14:17

## 2024-01-01 RX ADMIN — Medication 3.9 MICROGRAM(S)/KG/MIN: at 19:11

## 2024-01-01 RX ADMIN — Medication 1250 MILLIGRAM(S): at 17:15

## 2024-01-01 RX ADMIN — Medication 100 MILLIEQUIVALENT(S): at 03:27

## 2024-01-01 RX ADMIN — POLYETHYLENE GLYCOL 3350 17 GRAM(S): 17 POWDER, FOR SOLUTION ORAL at 12:09

## 2024-01-01 RX ADMIN — Medication 1250 MILLIGRAM(S): at 05:07

## 2024-01-01 RX ADMIN — LACTULOSE 10 GRAM(S): 10 SOLUTION ORAL at 05:46

## 2024-01-01 RX ADMIN — Medication 20 MILLIEQUIVALENT(S): at 05:41

## 2024-01-01 RX ADMIN — Medication 3.9 MICROGRAM(S)/KG/MIN: at 10:08

## 2024-01-01 RX ADMIN — Medication 50 MILLIEQUIVALENT(S): at 18:57

## 2024-01-01 RX ADMIN — Medication 3 MILLILITER(S): at 05:11

## 2024-01-01 RX ADMIN — MIDODRINE HYDROCHLORIDE 20 MILLIGRAM(S): 2.5 TABLET ORAL at 04:06

## 2024-01-01 RX ADMIN — Medication 3 MILLILITER(S): at 23:23

## 2024-01-01 RX ADMIN — LACTULOSE 10 GRAM(S): 10 SOLUTION ORAL at 22:22

## 2024-01-01 RX ADMIN — SODIUM CHLORIDE 4 MILLILITER(S): 9 INJECTION INTRAMUSCULAR; INTRAVENOUS; SUBCUTANEOUS at 11:36

## 2024-01-01 RX ADMIN — POLYETHYLENE GLYCOL 3350 17 GRAM(S): 17 POWDER, FOR SOLUTION ORAL at 11:10

## 2024-01-01 RX ADMIN — MIDODRINE HYDROCHLORIDE 20 MILLIGRAM(S): 2.5 TABLET ORAL at 05:27

## 2024-01-01 RX ADMIN — LACOSAMIDE 130 MILLIGRAM(S): 50 TABLET ORAL at 21:56

## 2024-01-01 RX ADMIN — LACOSAMIDE 150 MILLIGRAM(S): 50 TABLET ORAL at 18:18

## 2024-01-01 RX ADMIN — LACOSAMIDE 150 MILLIGRAM(S): 50 TABLET ORAL at 05:08

## 2024-01-01 RX ADMIN — CHLORHEXIDINE GLUCONATE 15 MILLILITER(S): 213 SOLUTION TOPICAL at 17:13

## 2024-01-01 RX ADMIN — Medication 81 MILLIGRAM(S): at 12:09

## 2024-01-01 RX ADMIN — Medication 1 MILLIGRAM(S): at 16:17

## 2024-01-01 RX ADMIN — CHLORHEXIDINE GLUCONATE 15 MILLILITER(S): 213 SOLUTION TOPICAL at 17:27

## 2024-01-01 RX ADMIN — Medication 50 MILLILITER(S): at 10:25

## 2024-01-01 RX ADMIN — Medication 650 MILLIGRAM(S): at 15:00

## 2024-01-01 RX ADMIN — LACTULOSE 10 GRAM(S): 10 SOLUTION ORAL at 21:06

## 2024-01-01 RX ADMIN — SODIUM CHLORIDE 4 MILLILITER(S): 9 INJECTION INTRAMUSCULAR; INTRAVENOUS; SUBCUTANEOUS at 17:15

## 2024-01-01 RX ADMIN — CHLORHEXIDINE GLUCONATE 15 MILLILITER(S): 213 SOLUTION TOPICAL at 17:03

## 2024-01-01 RX ADMIN — SODIUM CHLORIDE 4 MILLILITER(S): 9 INJECTION INTRAMUSCULAR; INTRAVENOUS; SUBCUTANEOUS at 05:23

## 2024-01-01 RX ADMIN — SODIUM CHLORIDE 4 MILLILITER(S): 9 INJECTION INTRAMUSCULAR; INTRAVENOUS; SUBCUTANEOUS at 00:41

## 2024-01-01 RX ADMIN — Medication 40 MILLIEQUIVALENT(S): at 21:57

## 2024-01-01 RX ADMIN — LACTULOSE 10 GRAM(S): 10 SOLUTION ORAL at 23:33

## 2024-01-01 RX ADMIN — Medication 3 MILLILITER(S): at 17:15

## 2024-01-01 RX ADMIN — HEPARIN SODIUM 5000 UNIT(S): 5000 INJECTION INTRAVENOUS; SUBCUTANEOUS at 05:47

## 2024-01-01 RX ADMIN — LATANOPROST 1 DROP(S): 0.05 SOLUTION/ DROPS OPHTHALMIC; TOPICAL at 22:23

## 2024-01-01 RX ADMIN — LATANOPROST 1 DROP(S): 0.05 SOLUTION/ DROPS OPHTHALMIC; TOPICAL at 23:33

## 2024-01-01 RX ADMIN — Medication 3 MILLILITER(S): at 12:15

## 2024-01-01 RX ADMIN — Medication 3.9 MICROGRAM(S)/KG/MIN: at 07:43

## 2024-01-01 RX ADMIN — CHLORHEXIDINE GLUCONATE 15 MILLILITER(S): 213 SOLUTION TOPICAL at 05:46

## 2024-01-01 RX ADMIN — BUMETANIDE 20 MG/HR: 0.25 INJECTION INTRAMUSCULAR; INTRAVENOUS at 05:38

## 2024-01-01 RX ADMIN — CHLORHEXIDINE GLUCONATE 1 APPLICATION(S): 213 SOLUTION TOPICAL at 05:26

## 2024-01-01 RX ADMIN — CHLORHEXIDINE GLUCONATE 15 MILLILITER(S): 213 SOLUTION TOPICAL at 05:40

## 2024-01-01 RX ADMIN — Medication 50 MILLIEQUIVALENT(S): at 20:18

## 2024-01-01 RX ADMIN — BUMETANIDE 1 MILLIGRAM(S): 0.25 INJECTION INTRAMUSCULAR; INTRAVENOUS at 14:05

## 2024-01-01 RX ADMIN — Medication 3 MILLILITER(S): at 12:08

## 2024-01-01 RX ADMIN — CHLORHEXIDINE GLUCONATE 15 MILLILITER(S): 213 SOLUTION TOPICAL at 17:12

## 2024-01-01 RX ADMIN — Medication 3 MILLILITER(S): at 23:21

## 2024-01-01 RX ADMIN — POLYETHYLENE GLYCOL 3350 17 GRAM(S): 17 POWDER, FOR SOLUTION ORAL at 13:24

## 2024-01-01 RX ADMIN — Medication 100 MILLIEQUIVALENT(S): at 23:07

## 2024-01-01 RX ADMIN — Medication 3.9 MICROGRAM(S)/KG/MIN: at 20:30

## 2024-01-01 RX ADMIN — CHLORHEXIDINE GLUCONATE 1 APPLICATION(S): 213 SOLUTION TOPICAL at 05:34

## 2024-01-01 RX ADMIN — POLYETHYLENE GLYCOL 3350 17 GRAM(S): 17 POWDER, FOR SOLUTION ORAL at 11:15

## 2024-01-01 RX ADMIN — HEPARIN SODIUM 5000 UNIT(S): 5000 INJECTION INTRAVENOUS; SUBCUTANEOUS at 05:18

## 2024-01-01 RX ADMIN — Medication 40 MILLIEQUIVALENT(S): at 10:10

## 2024-01-01 RX ADMIN — SODIUM CHLORIDE 4 MILLILITER(S): 9 INJECTION INTRAMUSCULAR; INTRAVENOUS; SUBCUTANEOUS at 05:36

## 2024-01-01 RX ADMIN — SODIUM CHLORIDE 4 MILLILITER(S): 9 INJECTION INTRAMUSCULAR; INTRAVENOUS; SUBCUTANEOUS at 11:17

## 2024-01-01 RX ADMIN — Medication 3 MILLILITER(S): at 11:38

## 2024-01-01 RX ADMIN — SODIUM CHLORIDE 4 MILLILITER(S): 9 INJECTION INTRAMUSCULAR; INTRAVENOUS; SUBCUTANEOUS at 18:16

## 2024-01-01 RX ADMIN — Medication 650 MILLIGRAM(S): at 01:09

## 2024-01-01 RX ADMIN — Medication 3 MILLILITER(S): at 17:16

## 2024-01-01 RX ADMIN — Medication 100 MILLIGRAM(S): at 12:23

## 2024-01-01 RX ADMIN — CHLORHEXIDINE GLUCONATE 1 APPLICATION(S): 213 SOLUTION TOPICAL at 16:49

## 2024-01-01 RX ADMIN — BUMETANIDE 20 MG/HR: 0.25 INJECTION INTRAMUSCULAR; INTRAVENOUS at 10:38

## 2024-01-01 RX ADMIN — Medication 81 MILLIGRAM(S): at 12:12

## 2024-01-01 RX ADMIN — HEPARIN SODIUM 5000 UNIT(S): 5000 INJECTION INTRAVENOUS; SUBCUTANEOUS at 05:39

## 2024-01-01 RX ADMIN — SODIUM CHLORIDE 500 MILLILITER(S): 9 INJECTION, SOLUTION INTRAVENOUS at 02:01

## 2024-01-01 RX ADMIN — Medication 2.5 MILLIGRAM(S): at 05:52

## 2024-01-01 RX ADMIN — CHLORHEXIDINE GLUCONATE 15 MILLILITER(S): 213 SOLUTION TOPICAL at 18:17

## 2024-01-01 RX ADMIN — VASOPRESSIN 6 UNIT(S)/MIN: 20 INJECTION INTRAVENOUS at 04:52

## 2024-01-01 RX ADMIN — BUMETANIDE 1 MILLIGRAM(S): 0.25 INJECTION INTRAMUSCULAR; INTRAVENOUS at 05:37

## 2024-01-01 RX ADMIN — Medication 3 MILLILITER(S): at 11:17

## 2024-01-01 RX ADMIN — SODIUM CHLORIDE 4 MILLILITER(S): 9 INJECTION INTRAMUSCULAR; INTRAVENOUS; SUBCUTANEOUS at 17:10

## 2024-01-01 RX ADMIN — LACOSAMIDE 130 MILLIGRAM(S): 50 TABLET ORAL at 09:08

## 2024-01-01 RX ADMIN — Medication 100 MILLIEQUIVALENT(S): at 04:23

## 2024-01-01 RX ADMIN — Medication 166.67 MILLIGRAM(S): at 12:22

## 2024-01-01 RX ADMIN — FENTANYL CITRATE 100 MICROGRAM(S): 50 INJECTION INTRAVENOUS at 16:48

## 2024-01-01 RX ADMIN — MIDODRINE HYDROCHLORIDE 20 MILLIGRAM(S): 2.5 TABLET ORAL at 16:30

## 2024-01-01 RX ADMIN — SODIUM CHLORIDE 75 MILLILITER(S): 9 INJECTION, SOLUTION INTRAVENOUS at 04:52

## 2024-01-01 RX ADMIN — HEPARIN SODIUM 5000 UNIT(S): 5000 INJECTION INTRAVENOUS; SUBCUTANEOUS at 13:17

## 2024-01-01 RX ADMIN — HEPARIN SODIUM 5000 UNIT(S): 5000 INJECTION INTRAVENOUS; SUBCUTANEOUS at 14:31

## 2024-01-01 RX ADMIN — HEPARIN SODIUM 5000 UNIT(S): 5000 INJECTION INTRAVENOUS; SUBCUTANEOUS at 05:08

## 2024-01-01 RX ADMIN — LACTULOSE 10 GRAM(S): 10 SOLUTION ORAL at 13:23

## 2024-01-01 RX ADMIN — SODIUM CHLORIDE 4 MILLILITER(S): 9 INJECTION INTRAMUSCULAR; INTRAVENOUS; SUBCUTANEOUS at 00:49

## 2024-01-01 RX ADMIN — Medication 3 MILLILITER(S): at 00:48

## 2024-01-01 RX ADMIN — CHLORHEXIDINE GLUCONATE 15 MILLILITER(S): 213 SOLUTION TOPICAL at 17:35

## 2024-01-01 RX ADMIN — SODIUM CHLORIDE 4 MILLILITER(S): 9 INJECTION INTRAMUSCULAR; INTRAVENOUS; SUBCUTANEOUS at 17:45

## 2024-01-01 RX ADMIN — MIDODRINE HYDROCHLORIDE 20 MILLIGRAM(S): 2.5 TABLET ORAL at 23:59

## 2024-01-01 RX ADMIN — LACOSAMIDE 150 MILLIGRAM(S): 50 TABLET ORAL at 16:52

## 2024-01-01 RX ADMIN — HEPARIN SODIUM 5000 UNIT(S): 5000 INJECTION INTRAVENOUS; SUBCUTANEOUS at 21:57

## 2024-01-01 RX ADMIN — Medication 650 MILLIGRAM(S): at 12:34

## 2024-01-01 RX ADMIN — Medication 650 MILLIGRAM(S): at 13:30

## 2024-01-01 RX ADMIN — LACOSAMIDE 150 MILLIGRAM(S): 50 TABLET ORAL at 17:08

## 2024-01-01 RX ADMIN — Medication 40 MILLIEQUIVALENT(S): at 01:31

## 2024-01-01 RX ADMIN — CHLORHEXIDINE GLUCONATE 15 MILLILITER(S): 213 SOLUTION TOPICAL at 17:56

## 2024-01-01 RX ADMIN — BUMETANIDE 20 MG/HR: 0.25 INJECTION INTRAMUSCULAR; INTRAVENOUS at 11:52

## 2024-01-01 RX ADMIN — POLYETHYLENE GLYCOL 3350 17 GRAM(S): 17 POWDER, FOR SOLUTION ORAL at 11:34

## 2024-01-01 RX ADMIN — Medication 81 MILLIGRAM(S): at 11:15

## 2024-01-01 RX ADMIN — Medication 3.9 MICROGRAM(S)/KG/MIN: at 01:00

## 2024-01-01 RX ADMIN — Medication 650 MILLIGRAM(S): at 22:57

## 2024-01-01 RX ADMIN — CHLORHEXIDINE GLUCONATE 15 MILLILITER(S): 213 SOLUTION TOPICAL at 05:30

## 2024-01-01 RX ADMIN — Medication 3 MILLILITER(S): at 11:41

## 2024-01-01 RX ADMIN — Medication 20 MILLIEQUIVALENT(S): at 06:08

## 2024-01-01 RX ADMIN — Medication 3 MILLILITER(S): at 17:56

## 2024-01-01 RX ADMIN — LACTULOSE 10 GRAM(S): 10 SOLUTION ORAL at 21:50

## 2024-01-01 RX ADMIN — LACOSAMIDE 150 MILLIGRAM(S): 50 TABLET ORAL at 06:09

## 2024-01-01 RX ADMIN — LACTULOSE 10 GRAM(S): 10 SOLUTION ORAL at 05:27

## 2024-01-01 RX ADMIN — HEPARIN SODIUM 5000 UNIT(S): 5000 INJECTION INTRAVENOUS; SUBCUTANEOUS at 23:33

## 2024-01-01 RX ADMIN — CEFTRIAXONE 100 MILLIGRAM(S): 500 INJECTION, POWDER, FOR SOLUTION INTRAMUSCULAR; INTRAVENOUS at 12:39

## 2024-01-01 RX ADMIN — BUMETANIDE 1 MILLIGRAM(S): 0.25 INJECTION INTRAMUSCULAR; INTRAVENOUS at 13:23

## 2024-01-01 RX ADMIN — HEPARIN SODIUM 5000 UNIT(S): 5000 INJECTION INTRAVENOUS; SUBCUTANEOUS at 06:12

## 2024-01-01 RX ADMIN — Medication 3 MILLILITER(S): at 05:09

## 2024-01-01 RX ADMIN — HEPARIN SODIUM 5000 UNIT(S): 5000 INJECTION INTRAVENOUS; SUBCUTANEOUS at 23:10

## 2024-01-01 RX ADMIN — MIDODRINE HYDROCHLORIDE 20 MILLIGRAM(S): 2.5 TABLET ORAL at 16:52

## 2024-01-01 RX ADMIN — LACTULOSE 10 GRAM(S): 10 SOLUTION ORAL at 05:09

## 2024-01-01 RX ADMIN — CHLORHEXIDINE GLUCONATE 1 APPLICATION(S): 213 SOLUTION TOPICAL at 06:17

## 2024-01-01 RX ADMIN — SODIUM CHLORIDE 2300 MILLILITER(S): 9 INJECTION, SOLUTION INTRAVENOUS at 10:52

## 2024-01-01 RX ADMIN — CHLORHEXIDINE GLUCONATE 15 MILLILITER(S): 213 SOLUTION TOPICAL at 17:58

## 2024-01-01 RX ADMIN — CHLORHEXIDINE GLUCONATE 1 APPLICATION(S): 213 SOLUTION TOPICAL at 05:37

## 2024-01-01 RX ADMIN — HEPARIN SODIUM 5000 UNIT(S): 5000 INJECTION INTRAVENOUS; SUBCUTANEOUS at 14:04

## 2024-01-01 RX ADMIN — SODIUM CHLORIDE 4 MILLILITER(S): 9 INJECTION INTRAMUSCULAR; INTRAVENOUS; SUBCUTANEOUS at 05:18

## 2024-01-01 RX ADMIN — CHLORHEXIDINE GLUCONATE 15 MILLILITER(S): 213 SOLUTION TOPICAL at 05:27

## 2024-01-01 RX ADMIN — HEPARIN SODIUM 5000 UNIT(S): 5000 INJECTION INTRAVENOUS; SUBCUTANEOUS at 05:46

## 2024-01-01 RX ADMIN — MORPHINE SULFATE 2 MILLIGRAM(S): 50 CAPSULE, EXTENDED RELEASE ORAL at 21:40

## 2024-01-01 RX ADMIN — SODIUM CHLORIDE 4 MILLILITER(S): 9 INJECTION INTRAMUSCULAR; INTRAVENOUS; SUBCUTANEOUS at 00:14

## 2024-01-01 RX ADMIN — SODIUM CHLORIDE 4 MILLILITER(S): 9 INJECTION INTRAMUSCULAR; INTRAVENOUS; SUBCUTANEOUS at 12:08

## 2024-01-01 RX ADMIN — HEPARIN SODIUM 5000 UNIT(S): 5000 INJECTION INTRAVENOUS; SUBCUTANEOUS at 23:35

## 2024-01-01 RX ADMIN — SODIUM CHLORIDE 4 MILLILITER(S): 9 INJECTION INTRAMUSCULAR; INTRAVENOUS; SUBCUTANEOUS at 00:13

## 2024-01-01 RX ADMIN — Medication 3.9 MICROGRAM(S)/KG/MIN: at 19:42

## 2024-01-01 RX ADMIN — LACTULOSE 10 GRAM(S): 10 SOLUTION ORAL at 14:35

## 2024-01-01 RX ADMIN — CHLORHEXIDINE GLUCONATE 15 MILLILITER(S): 213 SOLUTION TOPICAL at 18:58

## 2024-01-01 RX ADMIN — LACTULOSE 10 GRAM(S): 10 SOLUTION ORAL at 21:38

## 2024-01-01 RX ADMIN — Medication 40 MILLIEQUIVALENT(S): at 05:39

## 2024-01-01 RX ADMIN — SODIUM CHLORIDE 4 MILLILITER(S): 9 INJECTION INTRAMUSCULAR; INTRAVENOUS; SUBCUTANEOUS at 05:22

## 2024-01-01 RX ADMIN — MIDODRINE HYDROCHLORIDE 20 MILLIGRAM(S): 2.5 TABLET ORAL at 17:12

## 2024-01-01 RX ADMIN — LATANOPROST 1 DROP(S): 0.05 SOLUTION/ DROPS OPHTHALMIC; TOPICAL at 21:58

## 2024-01-01 RX ADMIN — CHLORHEXIDINE GLUCONATE 15 MILLILITER(S): 213 SOLUTION TOPICAL at 06:12

## 2024-01-01 RX ADMIN — POLYETHYLENE GLYCOL 3350 17 GRAM(S): 17 POWDER, FOR SOLUTION ORAL at 12:54

## 2024-01-01 RX ADMIN — LATANOPROST 1 DROP(S): 0.05 SOLUTION/ DROPS OPHTHALMIC; TOPICAL at 22:03

## 2024-01-01 RX ADMIN — Medication 3 MILLILITER(S): at 11:25

## 2024-01-01 RX ADMIN — Medication 3 MILLILITER(S): at 17:47

## 2024-01-01 RX ADMIN — HEPARIN SODIUM 5000 UNIT(S): 5000 INJECTION INTRAVENOUS; SUBCUTANEOUS at 14:30

## 2024-01-01 RX ADMIN — Medication 1000 MILLIGRAM(S): at 21:47

## 2024-01-01 RX ADMIN — MORPHINE SULFATE 2 MILLIGRAM(S): 50 CAPSULE, EXTENDED RELEASE ORAL at 16:40

## 2024-01-01 RX ADMIN — HEPARIN SODIUM 5000 UNIT(S): 5000 INJECTION INTRAVENOUS; SUBCUTANEOUS at 05:41

## 2024-01-01 RX ADMIN — Medication 100 MILLIEQUIVALENT(S): at 04:13

## 2024-01-01 RX ADMIN — Medication 81 MILLIGRAM(S): at 12:31

## 2024-01-01 RX ADMIN — MIDODRINE HYDROCHLORIDE 20 MILLIGRAM(S): 2.5 TABLET ORAL at 22:40

## 2024-01-01 RX ADMIN — Medication 81 MILLIGRAM(S): at 12:55

## 2024-01-01 RX ADMIN — Medication 3 MILLILITER(S): at 00:06

## 2024-01-01 RX ADMIN — CEFTRIAXONE 100 MILLIGRAM(S): 500 INJECTION, POWDER, FOR SOLUTION INTRAMUSCULAR; INTRAVENOUS at 12:24

## 2024-01-01 RX ADMIN — Medication 3.9 MICROGRAM(S)/KG/MIN: at 20:10

## 2024-01-01 RX ADMIN — Medication 3 MILLILITER(S): at 17:34

## 2024-01-01 RX ADMIN — MIDODRINE HYDROCHLORIDE 20 MILLIGRAM(S): 2.5 TABLET ORAL at 12:09

## 2024-01-01 RX ADMIN — VASOPRESSIN 6 UNIT(S)/MIN: 20 INJECTION INTRAVENOUS at 23:11

## 2024-01-01 RX ADMIN — Medication 3.9 MICROGRAM(S)/KG/MIN: at 07:42

## 2024-01-01 RX ADMIN — MIDODRINE HYDROCHLORIDE 20 MILLIGRAM(S): 2.5 TABLET ORAL at 05:40

## 2024-01-01 RX ADMIN — HEPARIN SODIUM 5000 UNIT(S): 5000 INJECTION INTRAVENOUS; SUBCUTANEOUS at 13:38

## 2024-01-01 RX ADMIN — Medication 3 MILLILITER(S): at 23:20

## 2024-01-01 RX ADMIN — Medication 650 MILLIGRAM(S): at 02:09

## 2024-01-01 RX ADMIN — LACOSAMIDE 150 MILLIGRAM(S): 50 TABLET ORAL at 17:21

## 2024-01-01 RX ADMIN — Medication 3 MILLILITER(S): at 05:36

## 2024-01-01 RX ADMIN — Medication 650 MILLIGRAM(S): at 06:11

## 2024-01-01 RX ADMIN — LACTULOSE 20 GRAM(S): 10 SOLUTION ORAL at 06:11

## 2024-01-01 RX ADMIN — Medication 50 MILLILITER(S): at 15:24

## 2024-01-01 RX ADMIN — Medication 3 MILLILITER(S): at 11:08

## 2024-01-01 RX ADMIN — Medication 650 MILLIGRAM(S): at 16:30

## 2024-01-01 RX ADMIN — PROPOFOL 4.63 MICROGRAM(S)/KG/MIN: 10 INJECTION, EMULSION INTRAVENOUS at 16:49

## 2024-01-01 RX ADMIN — Medication 3 MILLILITER(S): at 17:03

## 2024-01-01 RX ADMIN — Medication 3.9 MICROGRAM(S)/KG/MIN: at 19:19

## 2024-01-01 RX ADMIN — BUMETANIDE 20 MG/HR: 0.25 INJECTION INTRAMUSCULAR; INTRAVENOUS at 19:23

## 2024-01-01 RX ADMIN — MIDODRINE HYDROCHLORIDE 20 MILLIGRAM(S): 2.5 TABLET ORAL at 11:15

## 2024-01-01 RX ADMIN — MIDODRINE HYDROCHLORIDE 20 MILLIGRAM(S): 2.5 TABLET ORAL at 06:12

## 2024-01-01 RX ADMIN — MIDODRINE HYDROCHLORIDE 20 MILLIGRAM(S): 2.5 TABLET ORAL at 05:18

## 2024-01-01 RX ADMIN — MIDODRINE HYDROCHLORIDE 20 MILLIGRAM(S): 2.5 TABLET ORAL at 12:31

## 2024-01-01 RX ADMIN — CHLORHEXIDINE GLUCONATE 1 APPLICATION(S): 213 SOLUTION TOPICAL at 05:12

## 2024-01-01 RX ADMIN — Medication 650 MILLIGRAM(S): at 15:30

## 2024-01-01 RX ADMIN — Medication 3.9 MICROGRAM(S)/KG/MIN: at 19:23

## 2024-01-01 RX ADMIN — FENTANYL CITRATE 100 MICROGRAM(S): 50 INJECTION INTRAVENOUS at 17:18

## 2024-01-01 RX ADMIN — LATANOPROST 1 DROP(S): 0.05 SOLUTION/ DROPS OPHTHALMIC; TOPICAL at 21:22

## 2024-01-01 RX ADMIN — BUMETANIDE 20 MG/HR: 0.25 INJECTION INTRAMUSCULAR; INTRAVENOUS at 17:14

## 2024-01-01 RX ADMIN — HEPARIN SODIUM 5000 UNIT(S): 5000 INJECTION INTRAVENOUS; SUBCUTANEOUS at 21:36

## 2024-01-01 RX ADMIN — HEPARIN SODIUM 5000 UNIT(S): 5000 INJECTION INTRAVENOUS; SUBCUTANEOUS at 13:06

## 2024-01-01 RX ADMIN — BUMETANIDE 2 MILLIGRAM(S): 0.25 INJECTION INTRAMUSCULAR; INTRAVENOUS at 11:18

## 2024-01-01 RX ADMIN — LACOSAMIDE 130 MILLIGRAM(S): 50 TABLET ORAL at 09:13

## 2024-01-01 RX ADMIN — Medication 1 MILLIGRAM(S): at 20:24

## 2024-01-01 RX ADMIN — CEFTRIAXONE 100 MILLIGRAM(S): 500 INJECTION, POWDER, FOR SOLUTION INTRAMUSCULAR; INTRAVENOUS at 12:43

## 2024-01-01 RX ADMIN — Medication 3 MILLILITER(S): at 05:22

## 2024-01-01 RX ADMIN — Medication 100 MILLIEQUIVALENT(S): at 00:01

## 2024-01-01 RX ADMIN — MIDODRINE HYDROCHLORIDE 20 MILLIGRAM(S): 2.5 TABLET ORAL at 05:46

## 2024-01-01 RX ADMIN — Medication 81 MILLIGRAM(S): at 11:11

## 2024-01-01 RX ADMIN — LACOSAMIDE 150 MILLIGRAM(S): 50 TABLET ORAL at 17:48

## 2024-01-01 RX ADMIN — Medication 40 MILLIEQUIVALENT(S): at 17:31

## 2024-01-01 RX ADMIN — HEPARIN SODIUM 5000 UNIT(S): 5000 INJECTION INTRAVENOUS; SUBCUTANEOUS at 13:22

## 2024-01-01 RX ADMIN — SODIUM CHLORIDE 4 MILLILITER(S): 9 INJECTION INTRAMUSCULAR; INTRAVENOUS; SUBCUTANEOUS at 17:08

## 2024-01-01 RX ADMIN — MORPHINE SULFATE 2 MILLIGRAM(S): 50 CAPSULE, EXTENDED RELEASE ORAL at 20:48

## 2024-01-01 RX ADMIN — AZITHROMYCIN 255 MILLIGRAM(S): 500 TABLET, FILM COATED ORAL at 18:18

## 2024-01-01 RX ADMIN — Medication 50 MILLIGRAM(S): at 16:50

## 2024-01-01 RX ADMIN — POLYETHYLENE GLYCOL 3350 17 GRAM(S): 17 POWDER, FOR SOLUTION ORAL at 11:56

## 2024-01-01 RX ADMIN — SODIUM CHLORIDE 4 MILLILITER(S): 9 INJECTION INTRAMUSCULAR; INTRAVENOUS; SUBCUTANEOUS at 00:08

## 2024-01-01 RX ADMIN — SODIUM CHLORIDE 4 MILLILITER(S): 9 INJECTION INTRAMUSCULAR; INTRAVENOUS; SUBCUTANEOUS at 05:04

## 2024-01-01 RX ADMIN — Medication 3 MILLILITER(S): at 06:29

## 2024-01-01 RX ADMIN — CHLORHEXIDINE GLUCONATE 15 MILLILITER(S): 213 SOLUTION TOPICAL at 17:15

## 2024-01-01 RX ADMIN — POLYETHYLENE GLYCOL 3350 17 GRAM(S): 17 POWDER, FOR SOLUTION ORAL at 12:14

## 2024-01-01 RX ADMIN — Medication 650 MILLIGRAM(S): at 21:57

## 2024-01-01 RX ADMIN — Medication 650 MILLIGRAM(S): at 14:40

## 2024-01-01 RX ADMIN — CEFTRIAXONE 100 MILLIGRAM(S): 500 INJECTION, POWDER, FOR SOLUTION INTRAMUSCULAR; INTRAVENOUS at 12:55

## 2024-01-01 RX ADMIN — SODIUM CHLORIDE 500 MILLILITER(S): 9 INJECTION, SOLUTION INTRAVENOUS at 03:47

## 2024-01-01 RX ADMIN — SODIUM CHLORIDE 4 MILLILITER(S): 9 INJECTION INTRAMUSCULAR; INTRAVENOUS; SUBCUTANEOUS at 11:39

## 2024-01-01 RX ADMIN — Medication 3 MILLILITER(S): at 05:25

## 2024-01-01 RX ADMIN — SODIUM CHLORIDE 75 MILLILITER(S): 9 INJECTION, SOLUTION INTRAVENOUS at 10:39

## 2024-01-01 RX ADMIN — LACTULOSE 10 GRAM(S): 10 SOLUTION ORAL at 13:38

## 2024-01-01 RX ADMIN — HEPARIN SODIUM 5000 UNIT(S): 5000 INJECTION INTRAVENOUS; SUBCUTANEOUS at 05:32

## 2024-01-01 RX ADMIN — POLYETHYLENE GLYCOL 3350 17 GRAM(S): 17 POWDER, FOR SOLUTION ORAL at 11:19

## 2024-01-01 RX ADMIN — Medication 50 MILLIEQUIVALENT(S): at 14:39

## 2024-01-01 RX ADMIN — CHLORHEXIDINE GLUCONATE 15 MILLILITER(S): 213 SOLUTION TOPICAL at 16:52

## 2024-01-01 RX ADMIN — CHLORHEXIDINE GLUCONATE 1 APPLICATION(S): 213 SOLUTION TOPICAL at 05:32

## 2024-01-01 RX ADMIN — Medication 3 MILLILITER(S): at 23:08

## 2024-01-01 RX ADMIN — Medication 100 MILLIEQUIVALENT(S): at 17:32

## 2024-01-01 RX ADMIN — SODIUM CHLORIDE 4 MILLILITER(S): 9 INJECTION INTRAMUSCULAR; INTRAVENOUS; SUBCUTANEOUS at 11:08

## 2024-01-01 RX ADMIN — LACOSAMIDE 150 MILLIGRAM(S): 50 TABLET ORAL at 17:56

## 2024-01-01 RX ADMIN — HEPARIN SODIUM 5000 UNIT(S): 5000 INJECTION INTRAVENOUS; SUBCUTANEOUS at 13:56

## 2024-01-01 RX ADMIN — Medication 100 MILLIEQUIVALENT(S): at 05:27

## 2024-01-01 RX ADMIN — SODIUM CHLORIDE 4 MILLILITER(S): 9 INJECTION INTRAMUSCULAR; INTRAVENOUS; SUBCUTANEOUS at 17:16

## 2024-01-01 RX ADMIN — Medication 50 MILLILITER(S): at 02:02

## 2024-01-01 RX ADMIN — Medication 3 MILLILITER(S): at 00:41

## 2024-01-01 RX ADMIN — SODIUM CHLORIDE 75 MILLILITER(S): 9 INJECTION, SOLUTION INTRAVENOUS at 16:08

## 2024-01-01 RX ADMIN — CHLORHEXIDINE GLUCONATE 1 APPLICATION(S): 213 SOLUTION TOPICAL at 06:12

## 2024-01-01 RX ADMIN — Medication 50 MILLIEQUIVALENT(S): at 16:47

## 2024-01-01 RX ADMIN — CHLORHEXIDINE GLUCONATE 15 MILLILITER(S): 213 SOLUTION TOPICAL at 05:37

## 2024-01-01 RX ADMIN — LACOSAMIDE 130 MILLIGRAM(S): 50 TABLET ORAL at 21:16

## 2024-01-01 RX ADMIN — LACOSAMIDE 150 MILLIGRAM(S): 50 TABLET ORAL at 05:27

## 2024-01-01 RX ADMIN — HEPARIN SODIUM 5000 UNIT(S): 5000 INJECTION INTRAVENOUS; SUBCUTANEOUS at 22:14

## 2024-01-01 RX ADMIN — VASOPRESSIN 6 UNIT(S)/MIN: 20 INJECTION INTRAVENOUS at 19:11

## 2024-01-01 RX ADMIN — Medication 1250 MILLIGRAM(S): at 05:40

## 2024-01-01 RX ADMIN — BUMETANIDE 10 MG/HR: 0.25 INJECTION INTRAMUSCULAR; INTRAVENOUS at 12:54

## 2024-01-01 RX ADMIN — Medication 3 MILLILITER(S): at 00:04

## 2024-01-01 RX ADMIN — HEPARIN SODIUM 5000 UNIT(S): 5000 INJECTION INTRAVENOUS; SUBCUTANEOUS at 21:39

## 2024-01-01 RX ADMIN — SODIUM CHLORIDE 4 MILLILITER(S): 9 INJECTION INTRAMUSCULAR; INTRAVENOUS; SUBCUTANEOUS at 05:06

## 2024-01-14 NOTE — CHART NOTE - NSCHARTNOTEFT_GEN_A_CORE
:  Faith LOPEZ    INDICATION:  shock state  acute hypoxemic respiratory failure    PROCEDURE:  [ x] LIMITED ECHO  [ x] LIMITED CHEST  [ ] LIMITED RETROPERITONEAL  [ ] LIMITED ABDOMINAL  [ ] LIMITED DVT  [ ] NEEDLE GUIDANCE VASCULAR  [ ] NEEDLE GUIDANCE THORACENTESIS  [ ] NEEDLE GUIDANCE PARACENTESIS  [ ] NEEDLE GUIDANCE PERICARDIOCENTESIS  [ ] OTHER    FINDINGS:  Limited chest - A lines anteriorly, small consolidation R posterior laterally,     INTERPRETATION: :  Faith LOPEZ    INDICATION:  shock state  acute hypoxemic respiratory failure    PROCEDURE:  [ x] LIMITED ECHO  [ x] LIMITED CHEST  [ ] LIMITED RETROPERITONEAL  [ ] LIMITED ABDOMINAL  [ ] LIMITED DVT  [ ] NEEDLE GUIDANCE VASCULAR  [ ] NEEDLE GUIDANCE THORACENTESIS  [ ] NEEDLE GUIDANCE PARACENTESIS  [ ] NEEDLE GUIDANCE PERICARDIOCENTESIS  [ ] OTHER    FINDINGS:  Limited chest - A lines anteriorly, small consolidation R posterior laterally, L consolidation anteriorly, no pleural effusions  Limited TTE - unable to obtain any views except for subcostal, LV systolic function appears preserved overall, RV smaller than LV, IVC is virtual/small    INTERPRETATION:  give 1L LR and start maintenance for shock - may be under-resuscitated  aggressive chest PT for KRISTIE pneumonia    Images captured in [employer]

## 2024-01-14 NOTE — ED ADULT TRIAGE NOTE - CHIEF COMPLAINT QUOTE
Altered mental status x 1 week, 5 bedsores, Dementia O2 Sat 83% on RA FS 63 by EMS  placed on 100% Non Rebreather by EMS hypotensive 84/42 and cold too touch, not answering questions FS @ triage 30s

## 2024-01-14 NOTE — ED ADULT NURSE NOTE - OBJECTIVE STATEMENT
Pt is a 67yo Male AAOx1 NKDA pmh htn, TBI, hydrocephalus BIBA for AMS x 1 week. Code sepsis called. Pt attached to continuous cardiac and pulse oximetry monitoring. FS in ED 36, dextrose 50 % administered as ordered. Pt rectal temp 93.8, sadaf hugger initiated. Pt observed with five pressure injuries to the posterior sacrum. Zosyn initiated as ordered. Pt currently on NRB 15L/min with 1005 saturation at this time. Pt is a 69yo Male AAOx1 NKDA pmh htn, TBI, hydrocephalus BIBA for AMS x 1 week. Code sepsis called. Pt attached to continuous cardiac and pulse oximetry monitoring. FS in ED 36, dextrose 50 % administered as ordered. Pt rectal temp 93.8, sadaf hugger initiated. Pt observed with five pressure injuries to the posterior sacrum. Zosyn initiated as ordered. Pt currently on NRB 15L/min with 1005 saturation at this time. Pt is a 69yo Male AAOx1 NKDA pmh htn, TBI, hydrocephalus BIBA for AMS x 1 week, baseline dementia and bedbound with aid at bedside. pt arousable to verbal stimuli. Code sepsis called. Pt attached to continuous cardiac and pulse oximetry monitoring. FS in ED 36, 2x dextrose 50 % administered as ordered. Pt rectal temp 93.8, sadaf hugger initiated. Pt observed with five pressure injuries to the posterior sacrum. Zosyn initiated as ordered. Pt currently on NRB 15L/min with 100% saturation at this time.

## 2024-01-14 NOTE — PATIENT PROFILE ADULT - FALL HARM RISK - HARM RISK INTERVENTIONS
Assistance with ambulation/Assistance OOB with selected safe patient handling equipment/Communicate Risk of Fall with Harm to all staff/Discuss with provider need for PT consult/Monitor gait and stability/Provide patient with walking aids - walker, cane, crutches/Reinforce activity limits and safety measures with patient and family/Tailored Fall Risk Interventions/Visual Cue: Yellow wristband and red socks/Bed in lowest position, wheels locked, appropriate side rails in place/Call bell, personal items and telephone in reach/Instruct patient to call for assistance before getting out of bed or chair/Non-slip footwear when patient is out of bed/[place] to call system/Physically safe environment - no spills, clutter or unnecessary equipment/Purposeful Proactive Rounding/Room/bathroom lighting operational, light cord in reach Assistance with ambulation/Assistance OOB with selected safe patient handling equipment/Communicate Risk of Fall with Harm to all staff/Discuss with provider need for PT consult/Monitor gait and stability/Provide patient with walking aids - walker, cane, crutches/Reinforce activity limits and safety measures with patient and family/Tailored Fall Risk Interventions/Visual Cue: Yellow wristband and red socks/Bed in lowest position, wheels locked, appropriate side rails in place/Call bell, personal items and telephone in reach/Instruct patient to call for assistance before getting out of bed or chair/Non-slip footwear when patient is out of bed/[place] to call system/Physically safe environment - no spills, clutter or unnecessary equipment/Purposeful Proactive Rounding/Room/bathroom lighting operational, light cord in reach

## 2024-01-14 NOTE — PATIENT PROFILE ADULT - FUNCTIONAL ASSESSMENT - BASIC MOBILITY 6.
1-calculated by average /Not able to assess (calculate score using [facility] averaging method) 1-calculated by average /Not able to assess (calculate score using [facility] averaging method)

## 2024-01-14 NOTE — ED PROVIDER NOTE - CARE PLAN
Principal Discharge DX:	Acute hypoxic respiratory failure  Secondary Diagnosis:	Sepsis  Secondary Diagnosis:	AMS (altered mental status)   1

## 2024-01-14 NOTE — H&P ADULT - NSHPPHYSICALEXAM_GEN_ALL_CORE
GENERAL: NAD, lying in bed comfortably, NRB in place  HEAD:  Atraumatic, normocephalic  EYES: EOMI, PERRLA, conjunctiva and sclera clear  NECK: Supple, trachea midline, no JVD  HEART: Regular rate and rhythm  LUNGS: B/L wheezing,   ABDOMEN: Soft, nontender, nondistended  EXTREMITIES: 2+ peripheral pulses bilaterally. No clubbing, cyanosis, or edema  NERVOUS SYSTEM:  opens eyes to voice not interactive, moving all extremities  SKIN: + sacral wounds GENERAL: NAD, lying in bed comfortably, NRB in place  HEAD:  Atraumatic, normocephalic  EYES: EOMI, PERRLA, conjunctiva and sclera clear  NECK: Supple, trachea midline, no JVD  HEART: Regular rate and rhythm  LUNGS: B/L wheezing,   ABDOMEN: Soft, nontender, nondistended  EXTREMITIES: 2+ peripheral pulses bilaterally. No clubbing, cyanosis, or edema  NERVOUS SYSTEM:  opens eyes to voice not interactive  SKIN: + sacral wounds

## 2024-01-14 NOTE — ED PROVIDER NOTE - CLINICAL SUMMARY MEDICAL DECISION MAKING FREE TEXT BOX
68-year-old male past medical history of seizure d/o dementia, hydrocephalus status post  shunt, previous traumatic brain injury, wheelchair-bound, typically interactive question COPD baseline medications, presents brought in by aide for increased lethargy and somnolence and decreased p.o. intake ongoing for about 1 week, today is unresponsive.  No reported vomiting.  Per EMS patient was hypoglycemic and hypotensive and hypoxic on scene .  Per patient's mother, patient recently had Depakote dose adjusted due to lethargy and decreased.  Patient's daughter called [phone] [facility] [place]. Pt reportedly having new bed sores on sacrum   - ct brain, cxr, sepsis w/u, supplemental O2, IV abx, fluids, check depakoate level    - pt persistent hypotensive despite initial 30 cc/kg fluids - ICU consulted, accepted , on pressors 68-year-old male past medical history of seizure d/o dementia, hydrocephalus status post  shunt, previous traumatic brain injury, wheelchair-bound, typically interactive question COPD baseline medications, presents brought in by aide for increased lethargy and somnolence and decreased p.o. intake ongoing for about 1 week, today is unresponsive.  No reported vomiting.  Per EMS patient was hypoglycemic and hypotensive and hypoxic on scene .  Per patient's mother, patient recently had Depakote dose adjusted due to lethargy and decreased.  Patient's daughter called [phone] [facility] [place]. Pt reportedly having new bed sores on sacrum   - ct brain, cxr, sepsis w/u, supplemental O2, IV abx, fluids, check depakoate level    - pt persistent hypotensive despite initial 30 cc/kg fluids - ICU consulted, accepted , on pressors

## 2024-01-14 NOTE — PROCEDURE NOTE - NSPRE-BRON/TUBRISKASSES_GEN_ALL_CORE
Due to emergent procedure , I evaluated the patient after bronchoscopy procedure for active pulmonary/laryngeal M. tuberculosis disease and the risk and actions taken:    Low risk with routine standard of care measures followed and all staff wore N 95 respirators as a precaution.

## 2024-01-14 NOTE — H&P ADULT - ASSESSMENT
Pt is a 68-year-old male with a PMHX of seizure disorder, dementia, hydrocephalus status post  shunt, previous traumatic brain injury, and wheelchair-bound at baseline is interactive presents to ED today brought in by home health aid for increased lethargy and decreased PO intake for the past week. Upon EMS arrival pt found to be hypoxic, hypotensive, and hypoglycemic. In ED placed on NRB for hypoxemia additionally with persistent hypotensive despite 2L IVF subsequently started on pressors. Found to have a left lower lobe infiltrate on imaging. Pt admitted to ICU for septic shock likely 2/2 pna.     # Neuro:  //AMS  - Likely 2/2 metabolic encephalopathy in the setting of septic shock   - Pt awake and opening eyes to voice, will continue to monitor mentation.  - Baseline interactive     //Seizure disorder  - Recent Depakote dose changed per EDs note, will start antiepileptic meds after confirming pts home meds with patients mother/pharmacy once pt is on unit  - On exam no active seizure like activity, will monitor for any seizure activity     //TBI w/  shunt  - CTH in ED Ventriculostomy noted in situ without evidence for hydrocephalus.     #Resp:  // Acute hypoxic respiratory failure requiring NRB   - Like 2/2 PNA   - Chest x-ray: Extensive left lung infiltrate and effusion with slight infiltrate at right base medially.   - CT chest:  Large consolidative/atelectatic changes involving the left lung. Small left pleural effusion. Complete opacification of the left lower lobe bronchus.    -satting adequately and comfortably on NRB, will continue to titrate down oxygen supplementation as tolerated while maintaining sats>92%   - VBG on admission: 7.39/61/38/37.  - incentive spirometry   - C/w pulmonary toileting modalities: duonebs, hypersal, chest pt     #CV:  // Hypotension requiring pressors  - Likely 2/2 septic shock in the setting on pna  - Will continue to titrate down pressors as tolerated while maintaining MAP goal >65  - F/u TTE  - Will POCUS once on unit     #GI:  -Diet: NPO   - Will place NGT for medications administration   - GI ppx: PPI    #Renal:  // SOFIA  - Likely prerenal in the setting of septic shock   - Unknown baseline renal fxn   - fluids/IVL: s/p 3 L IVF  - Will POCUS pt once on unit to evaluate fluid status   - sarita reed to monitor strict I/Os  - replete lytes as appropriate     #ID:  // Sepsis   - Likely 2/2 PNA vs sacral wounds   - S/p vanc/zosyn in ED   - Will cover pt with ceftriaxone/azithro/vanc  - afebrile, wbc nl will continue to trend labs   - F/u infectious work up: mycoplasma, legionella, strep pneu, RVP, sputum cx, MRSA, urine cx, UA, and blood cxs  - Will consult pt for wound care for sacral wounds     #Heme:  //DVT ppx: HSQ  - cbc stable    #Endo:  - No active issues     #General:  - Dispo: ICU  - Full code   - Pt from home. health care proxy is pts mother.    Case discussed with ICU attending, Dr. Cuevas  Pt is a 68-year-old male with a PMHX of seizure disorder, dementia, hydrocephalus status post  shunt, previous traumatic brain injury, and wheelchair-bound at baseline is interactive presents to ED today brought in by home health aid for increased lethargy and decreased PO intake for the past week. Upon EMS arrival pt found to be hypoxic, hypotensive, and hypoglycemic. In ED placed on NRB for hypoxemia additionally with persistent hypotensive despite 2L IVF subsequently started on pressors. Found to have a left lower lobe infiltrate on imaging. Pt admitted to ICU for septic shock likely 2/2 pna vs UTI.     # Neuro:  //AMS  - Likely 2/2 metabolic encephalopathy in the setting of septic shock   - Pt awake and opening eyes to voice, will continue to monitor mentation.  - Baseline interactive     //Seizure disorder  - Recent Depakote dose changed per EDs note, will start antiepileptic meds after confirming pts home meds with patients mother/pharmacy once pt is on unit  - On exam no active seizure like activity, will monitor for any seizure activity     //TBI w/  shunt  - CTH in ED Ventriculostomy noted in situ without evidence for hydrocephalus.     #Resp:  // Acute hypoxic respiratory failure requiring NRB   - Like 2/2 PNA   - Chest x-ray: Extensive left lung infiltrate and effusion with slight infiltrate at right base medially.   - CT chest:  Large consolidative/atelectatic changes involving the left lung. Small left pleural effusion. Complete opacification of the left lower lobe bronchus.    -satting adequately and comfortably on NRB, will continue to titrate down oxygen supplementation as tolerated while maintaining sats>92%   - VBG on admission: 7.39/61/38/37.  - incentive spirometry   - C/w pulmonary toileting modalities: duonebs, hypersal, chest pt     #CV:  // Hypotension requiring pressors  - Likely 2/2 septic shock  - Will continue to titrate down pressors as tolerated while maintaining MAP goal >65  - F/u TTE  - Will POCUS once on unit     #GI:  -Diet: NPO   - Will place NGT for medications administration   - GI ppx: PPI    #Renal:  // SOFIA  - Likely prerenal in the setting of septic shock   - Unknown baseline renal fxn   - fluids/IVL: s/p 3 L IVF  - Will POCUS pt once on unit to evaluate fluid status   - brianna, cont to monitor strict I/Os  - replete lytes as appropriate     #ID:  // Sepsis   - Likely 2/2 PNA vs sacral wounds vs UTI  - S/p vanc/zosyn in ED   - Will cover pt with ceftriaxone/azithro/vanc  - afebrile, wbc nl will continue to trend labs   - F/u infectious work up: mycoplasma, legionella, strep pneu, RVP, sputum cx, MRSA, urine cx, and blood cxs  - Will consult pt for wound care for sacral wounds     #Heme:  //DVT ppx: HSQ  - cbc stable    #Endo:  - No active issues     #General:  - Dispo: ICU  - Full code   - Pt from home. health care proxy is pts mother.    Case discussed with ICU attending, Dr. Cuevas  Pt is a 68-year-old male with a PMHX of seizure disorder, dementia, hydrocephalus status post  shunt, previous traumatic brain injury, and wheelchair-bound at baseline is interactive presents to ED today brought in by home health aid for increased lethargy and decreased PO intake for the past week. Upon EMS arrival pt found to be hypoxic, hypotensive, and hypoglycemic. In ED placed on NRB for hypoxemia additionally with persistent hypotensive despite 2L IVF subsequently started on pressors. Found to have a left lower lobe infiltrate on imaging. Pt admitted to ICU for septic shock likely 2/2 pna vs UTI.     # Neuro:  //AMS  - Likely 2/2 metabolic encephalopathy in the setting of septic shock   - Pt awake and opening eyes to voice, will continue to monitor mentation.  - Baseline interactive     //Seizure disorder  - Recent Depakote dose changed per EDs note, will start antiepileptic meds after confirming pts home meds with patients mother/pharmacy once pt is on unit  - On exam no active seizure like activity, will monitor for any seizure activity     //TBI w/  shunt  - CTH in ED Ventriculostomy noted in situ without evidence for hydrocephalus.     #Resp:  // Acute hypoxic respiratory failure requiring NRB   - Like 2/2 PNA   - Chest x-ray: Extensive left lung infiltrate and effusion with slight infiltrate at right base medially.   - CT chest:  Large consolidative/atelectatic changes involving the left lung. Small left pleural effusion. Complete opacification of the left lower lobe bronchus.    -satting adequately and comfortably on NRB, will continue to titrate down oxygen supplementation as tolerated while maintaining sats>92%   - VBG on admission: 7.39/61/38/37.  - incentive spirometry   - C/w pulmonary toileting modalities: duonebs, hypersal, chest pt     #CV:  // Hypotension requiring pressors  - Likely 2/2 septic shock  - Will continue to titrate down pressors as tolerated while maintaining MAP goal >65  - F/u TTE  - Will POCUS once on unit     #GI:  -Diet: NPO   - Will place NGT for medications administration   - GI ppx: PPI  -CT abd/pelvis: exophytic lesion at the mid to lower pole of the right kidney and additional bilateral renal cysts    #Renal:  // SOFIA  - Likely prerenal in the setting of septic shock   - Unknown baseline renal fxn   - fluids/IVL: s/p 3 L IVF  - Will POCUS pt once on unit to evaluate fluid status   - reed, cont to monitor strict I/Os  - replete lytes as appropriate     #ID:  // Sepsis   - Likely 2/2 PNA vs sacral wounds vs UTI  - S/p vanc/zosyn in ED   - Will cover pt with ceftriaxone/azithro/vanc  - afebrile, wbc nl will continue to trend labs   - F/u infectious work up: mycoplasma, legionella, strep pneu, RVP, sputum cx, MRSA, urine cx, and blood cxs  - Will consult pt for wound care for sacral wounds     #Heme:  //DVT ppx: HSQ  - cbc stable    #Endo:  - No active issues     #General:  - Dispo: ICU  - Full code   - Pt from home. health care proxy is pts mother.    Case discussed with ICU attending, Dr. Cuevas  Pt is a 68-year-old male with a PMHX of seizure disorder, dementia, hydrocephalus status post  shunt, previous traumatic brain injury, and wheelchair-bound at baseline is interactive presents to ED today brought in by home health aid for increased lethargy and decreased PO intake for the past week. Upon EMS arrival pt found to be hypoxic, hypotensive, and hypoglycemic. In ED placed on NRB for hypoxemia additionally with persistent hypotensive despite 2L IVF subsequently started on pressors. Found to have a left lower lobe infiltrate on imaging. Pt admitted to ICU for septic shock likely 2/2 pna vs UTI.     # Neuro:  //AMS  - Likely 2/2 metabolic encephalopathy in the setting of septic shock   - Pt awake and opening eyes to voice, will continue to monitor mentation.  - Baseline interactive     //Seizure disorder  - Recent Depakote dose changed per EDs note, will start antiepileptic meds after confirming pts home meds with patients mother/pharmacy once pt is on unit  - On exam no active seizure like activity, will monitor for any seizure activity   - f/u Depakote level     //TBI w/  shunt  - CTH in ED Ventriculostomy noted in situ without evidence for hydrocephalus.     #Resp:  // Acute hypoxic respiratory failure requiring NRB   - Like 2/2 PNA   - Chest x-ray: Extensive left lung infiltrate and effusion with slight infiltrate at right base medially.   - CT chest:  Large consolidative/atelectatic changes involving the left lung. Small left pleural effusion. Complete opacification of the left lower lobe bronchus.    -satting adequately and comfortably on NRB, will continue to titrate down oxygen supplementation as tolerated while maintaining sats>92%   - VBG on admission: 7.39/61/38/37.  - incentive spirometry   - C/w pulmonary toileting modalities: duonebs, hypersal, chest pt     #CV:  // Hypotension requiring pressors  - Likely 2/2 septic shock  - Will continue to titrate down pressors as tolerated while maintaining MAP goal >65  - F/u TTE  - Will POCUS once on unit     #GI:  -Diet: NPO   - Will place NGT for medications administration   - GI ppx: PPI  -CT abd/pelvis: exophytic lesion at the mid to lower pole of the right kidney and additional bilateral renal cysts    #Renal:  // SOFIA  - Likely prerenal in the setting of septic shock   - Unknown baseline renal fxn   - fluids/IVL: s/p 3 L IVF  - Will POCUS pt once on unit to evaluate fluid status   - reed, cont to monitor strict I/Os  - replete lytes as appropriate     #ID:  // Sepsis   - Likely 2/2 PNA vs sacral wounds vs UTI  - S/p vanc/zosyn in ED   - Will cover pt with ceftriaxone/azithro/vanc  - afebrile, wbc nl will continue to trend labs   - F/u infectious work up: mycoplasma, legionella, strep pneu, RVP, sputum cx, MRSA, urine cx, and blood cxs  - Will consult pt for wound care for sacral wounds     #Heme:  //DVT ppx: HSQ  - cbc stable    #Endo:  - No active issues     #General:  - Dispo: ICU  - Full code   - Pt from home. health care proxy is pts mother.    Case discussed with ICU attending, Dr. Cuevas  Pt is a 68-year-old male with a PMHX of seizure disorder, dementia, hydrocephalus status post  shunt, previous traumatic brain injury, and wheelchair-bound at baseline is interactive presents to ED today brought in by home health aid for increased lethargy and decreased PO intake for the past week. Upon EMS arrival pt found to be hypoxic, hypotensive, and hypoglycemic. In ED placed on NRB for hypoxemia additionally with persistent hypotensive despite 2L IVF subsequently started on pressors. Found to have a left lower lobe infiltrate on imaging. Pt admitted to ICU for septic shock likely 2/2 pna vs UTI.     # Neuro:  //AMS  - Likely 2/2 metabolic encephalopathy in the setting of septic shock   - Pt awake and opening eyes to voice, will continue to monitor mentation.  - Baseline interactive     //Seizure disorder  - Recent Depakote dose changed per EDs note, will start antiepileptic meds after confirming pts home meds with patients mother/pharmacy once pt is on unit  - On exam no active seizure like activity, will monitor for any seizure activity   - f/u Depakote level     //TBI w/  shunt  - CTH in ED Ventriculostomy noted in situ without evidence for hydrocephalus.     #Resp:  // Acute hypoxic respiratory failure requiring NRB   - Like 2/2 PNA   - Chest x-ray: Extensive left lung infiltrate and effusion with slight infiltrate at right base medially.   - CT chest:  Large consolidative/atelectatic changes involving the left lung. Small left pleural effusion. Complete opacification of the left lower lobe bronchus.    -satting adequately and comfortably on NRB, will continue to titrate down oxygen supplementation as tolerated while maintaining sats>92%   - VBG on admission: 7.39/61/38/37.  - incentive spirometry   - C/w pulmonary toileting modalities: duonebs, hypersal, chest pt     #CV:  // Hypotension requiring pressors  - Likely 2/2 septic shock  - Will continue to titrate down pressors as tolerated while maintaining MAP goal >65  - F/u TTE  - Will POCUS once on unit     #GI:  -Diet: NPO   - Will place NGT for medications administration   - GI ppx: PPI  -CT abd/pelvis: exophytic lesion at the mid to lower pole of the right kidney and additional bilateral renal cysts    #Renal:  // SOFIA  - Likely prerenal in the setting of septic shock   - Unknown baseline renal fxn   - fluids/IVL: s/p 3 L IVF  - Will POCUS pt once on unit to evaluate fluid status   - reed, cont to monitor strict I/Os  - replete lytes as appropriate     #ID:  // Sepsis   - Likely 2/2 PNA vs sacral wounds vs UTI  - S/p vanc/zosyn in ED   - Will cover pt with ceftriaxone/azithro/vanc  - afebrile, wbc nl will continue to trend labs   - F/u infectious work up: mycoplasma, legionella, strep pneu, RVP, sputum cx, MRSA, urine cx, and blood cxs  - Will consult pt for wound care for sacral wounds     #Heme:  //DVT ppx: HSQ  - cbc stable    #Endo:  //Hypoglycemia  - Likely 2/2 decreased PO intake over the last week  - S/p 2 amps of dextrose in ED  - Will check finger sticks q6   - Trend glucose levels      #General:  - Dispo: ICU  - Full code   - Pt from home. health care proxy is pts mother.    Case discussed with ICU attending, Dr. Cuevas

## 2024-01-14 NOTE — ED PROVIDER NOTE - PHYSICAL EXAMINATION
Gen: lethargic, initially unresponsive, then opens eyes to voice after glucose given   Head: NCAT  ENT: Airway patent, moist mucous membranes, nasal passageways clear, no pharyngeal erythema or exudates, uvula midline, no cervical lymphadenopathy  Cardiac: Normal rate, normal rhythm   Respiratory: Lungs CTA B/L  Gastrointestinal: Abdomen soft, nontender, nondistended, no rebound, no guarding  MSK: No gross abnormalities, FROM of all four extremities, no edema  HEME: Extremities warm   Skin: No rashes, no lesions  Neuro: No focal deficits but does not interact

## 2024-01-14 NOTE — PROCEDURE NOTE - NSBRONCHFINDINGS_GEN_A_CORE_FT
Thin secretions in RMB and RLL superior segment. Thicker blood tinged secretions in LMB and KRISTIE bronchus and mucous plugs removed from KRISTIE.

## 2024-01-14 NOTE — PROCEDURE NOTE - NSBRONCHHISTORY_GEN_A_CORE_FT
TBI, Dementia. Admitted w/ acute hypoxemic respiratory failure w/ KRISTIE pneumonia on imaging. Developed worsening hypoxemia and poor mental status. Pt intubated and bronchoscopy performed.

## 2024-01-14 NOTE — H&P ADULT - CRITICAL CARE ATTENDING COMMENT
68M w/ TBI, s/p VPS, seizure disorder, dementia, wheelchair bound, somewhat interactive. Presents w/ increased lethargy and poor PO intake and today he became increasingly unresponsive. In ED, pt hypoglycemic to 30s, hypotensive and hypoxemic. Pt placed on NRB, given 2.3L IVF and D50, as well as vancomycin and zosyn. Remained hypotensive and pressors started. CT imaging revealed KRISTIE pneumonia. ICU consulted for admission for acute hypoxemic respiratory failure and septic shock in setting of pneumonia.     #Neuro - pt more sedated than usual, check depakote level and will restart; CTH no acute findings and VPS in place  #CV - shock state, likely septic shock, in setting of pneumonia, titrate to MAP >/65-70; POCUS shows small IVC, will give additional IVF; unclear why pt is on asa and plavix, continue asa for now  #Pulm - acute hypoxemic respiratory failure on NRB, not tolerating nasal canula likely due to pneumonia; start aggressive chest PT w/ duonebs and 3% hypersal w/ NG suctioning as needed  #ID- CAP coverage w/ ceftiaxone and azithromycin, as well as vancomycin for now; f/u blood, urine cx, RVP; send MRSA PCR, sputum cx, procal, urine legionella, strept pneumo ag, mycoplasma serologies  #Renal/metabolic - SOFIA likely prerenal ATN; monitor I/Os, electrolytes; give additional IVF   #GI- place NGT as pt unable to swallow, start TF and give PO meds, start bowel regimen  #Heme - noted anemia, unknown baseline, monitor for now  #Endo - hypoglycemia on admission likely due to sepsis, start D5 with IVF for now, check FS q6, ceck hgba1c; TSH elevated, T3,T4 pending  #Skin - numerous sacral wounds, wound care consult  #PPx - HSQ q8  #Dispo- admit to ICU for septic shock and acute hypoxemic respiratory failure; prognosis very guarded; full code

## 2024-01-14 NOTE — H&P ADULT - NSHPLABSRESULTS_GEN_ALL_CORE
LABS:  cret                        9.0    9.12  )-----------( 161      ( 14 Jan 2024 10:20 )             29.0     01-14    139  |  98  |  107<H>  ----------------------------<  39<LL>  4.2   |  33<H>  |  3.08<H>    Ca    9.7      14 Jan 2024 10:20    TPro  6.5  /  Alb  1.6<L>  /  TBili  0.5  /  DBili  x   /  AST  55<H>  /  ALT  13  /  AlkPhos  144<H>  01-14    PT/INR - ( 14 Jan 2024 10:20 )   PT: 12.6 sec;   INR: 1.06 ratio         PTT - ( 14 Jan 2024 10:20 )  PTT:44.6 sec

## 2024-01-14 NOTE — CHART NOTE - NSCHARTNOTEFT_GEN_A_CORE
After pt arrived to ICU he continued to have trouble maintaining sats despite NRB. WE attempted deep suctioning, chest PT however this was unsuccessful. Pt was also lethargic although arousable and unclear if he was protecting airway. Decision made to intubate patient. Induced w/ versed and propofol and pt successfully intubated. Sats initially came up but then dropped concurrently with worsening hypotension. He was suctioned deeply with improvement in sat. I decided to perform emergent bronchoscopy at this time considering significant consolidation on CT chest in KRISTIE. There was some mucous in KRISTIE but not a significant amount was suctioned. Unfortunately, I am not certain the suction was working properly despite multiple attempts at troubleshooting. Bronchial wash was performed and sent to lab. He did settle down and pressor requirements decreased significantly. ABG performed showed significant hypoxia, however w/ PF ~70. AT the same time he began desaturating again, he was bagged and at the same time CXR was performed to check lines and NGT. L lung appeared more hazy. He was put R side down with significant improvement in saturations with concurrent increase in PEEP to 10. Repeat ABG pending. Keep pt R side down, continue aggressive pulmonary toilet. Pt's family made aware of these events.     Critical care time spend in addition to prior 35 minutes excluding procedures.

## 2024-01-14 NOTE — ED ADULT NURSE NOTE - NSFALLHARMRISKINTERV_ED_ALL_ED

## 2024-01-14 NOTE — ED PROVIDER NOTE - OBJECTIVE STATEMENT
68-year-old male past medical history of seizure d/o dementia, hydrocephalus status post  shunt, previous traumatic brain injury, wheelchair-bound, typically interactive question COPD baseline medications, presents brought in by aide for increased lethargy and somnolence and decreased p.o. intake ongoing for about 1 week, today is unresponsive.  No reported vomiting.  Per EMS patient was hypoglycemic and hypotensive and hypoxic on scene .  Per patient's mother, patient recently had Depakote dose adjusted due to lethargy and decreased.  Patient's daughter called [phone] Paulina Moya. Pt reportedly having new bed sores on sacrum 68-year-old male past medical history of seizure d/o dementia, hydrocephalus status post  shunt, previous traumatic brain injury, wheelchair-bound, typically interactive question COPD baseline medications, presents brought in by aide for increased lethargy and somnolence and decreased p.o. intake ongoing for about 1 week, today is unresponsive.  No reported vomiting.  Per EMS patient was hypoglycemic and hypotensive and hypoxic on scene .  Per patient's mother, patient recently had Depakote dose adjusted due to lethargy and decreased.  Patient's daughter called [phone] Paulina Moya. Pt reportedly having new bed sores on sacrum

## 2024-01-14 NOTE — PROCEDURE NOTE - NSBRONCHPROCDETAILS_GEN_A_CORE_FT
Flexible bronchoscope inserted through ETT. ETT in position. Minimal secretions noted initially. R side of tracheobronchial tree examined to subsegmental level, some small thin clear secretions noted in RLL superior segment. All other subsegments clear. LMB showed some secretions/sputum which were suctioned. Washing performed in KRISTIE and small amount of bloody material removed. Small mucous plugs kept coming up from KRISTIE. LLL clear. Bronchoscope withdrawn.

## 2024-01-15 NOTE — PROGRESS NOTE ADULT - ASSESSMENT
67 y/o M w/dementia, seizure disorder, hydrocephalus s/p  shunt, prior TBI admitted for AMS and acute hypoxemic respiratory failure. Respiratory failure likely secondary to PNA and acute pulmonary edema. Hypotension likely secondary to severe sepsis with septic shock. Presumed SOFIA ( unknown baseline) likely ATN.    - Sedation as needed goal RASS -1 to 0  - Continue mechanical ventilation wean as tolerated  - Titrate pressors as needed goal MAP >= 65  - Diuresis goal net negative 1-2 L daily   - Trend Cr, avoid nephrotoxins  - Broad spectrum abx, follow up cultures  - DVT prophylaxis  - Full code    I have personally provided 35 minutes of attending critical care time excluding procedures. 69 y/o M w/dementia, seizure disorder, hydrocephalus s/p  shunt, prior TBI admitted for AMS and acute hypoxemic respiratory failure. Respiratory failure likely secondary to PNA and acute pulmonary edema. Hypotension likely secondary to severe sepsis with septic shock. Presumed SOFIA ( unknown baseline) likely ATN.    - Sedation as needed goal RASS -1 to 0  - Continue mechanical ventilation wean as tolerated  - Titrate pressors as needed goal MAP >= 65  - Diuresis goal net negative 1-2 L daily   - Trend Cr, avoid nephrotoxins  - Broad spectrum abx, follow up cultures  - DVT prophylaxis  - Full code    I have personally provided 35 minutes of attending critical care time excluding procedures.

## 2024-01-15 NOTE — PROVIDER CONTACT NOTE (HYPOGLYCEMIA EVENT) - NS PROVIDER CONTACT BACKGROUND-HYPO
Age: 68y    Gender: Male    POCT Blood Glucose:  75 mg/dL (01-15-24 @ 17:19)  71 mg/dL (01-15-24 @ 17:18)  99 mg/dL (01-15-24 @ 11:29)  107 mg/dL (01-15-24 @ 04:50)      eMAR:  vasopressin Infusion   6 mL/Hr IV Continuous (01-14-24 @ 18:09)

## 2024-01-15 NOTE — PROGRESS NOTE ADULT - SUBJECTIVE AND OBJECTIVE BOX
HPI:  Pt is a 68-year-old male with a PMHX of seizure disorder, dementia, hydrocephalus status post  shunt, previous traumatic brain injury, and wheelchair-bound at baseline is interactive presents to ED today brought in by home health aid for increased lethargy and decreased PO intake for the past week. Today pt become altered and unresponsive. EMS called upon arrival pt was hypoglycemic, hypotensive, and hypoxemic. Per home health aid pt has been having worsening difficulty with eating over the last couple of months and takes him a long time to swallow his pills. Per patient's mother, patient recently had Depakote dose decreased due to being more lethargic. Per ED pt with new bed sores on sacrum. H&P obtained by pts home health aid at bedside, pt nonresponsive to questions at this time.   In ED, pt hypoglycemic s/p 2 amps, hypoxemic on RA placed on NRB, and hypotensive (85/52) s/p 2L IVF with minimal improvement subsequently started on levophed.  S/p one dose vanc/zosyn. Labs remarkable for Glucose 39, Cr 3.08, , lactate 4.0, BNP 2942. VB.39/61/38/37.   Chest x-ray: Extensive left lung infiltrate and effusion with slight infiltrate at right base medially. Right ventricular shunt.  CTH noncon: Ventriculostomy noted in situ without evidence for hydrocephalus.  CT chest: Large consolidative/atelectatic changes involving the left lung. Small left pleural effusion. Complete opacification of the left lower lobe bronchus. Secretions within the left mainstem bronchus.   CT abd/pelvis: exophytic lesion at the mid to lower pole of the right kidney and additional bilateral renal cysts    Subjective:   Pt seen and examined at bedside. Pt awake and alert, laying comfortably in bed with NRB in place. Pt not answering to questions at this time, opening eyes to voice.      (2024 12:59)      24 hr events: Admitted to ICU, intubated and started on pressors. Underwent bronchoscopy.    ## ROS:  [x ] unable to obtain    ## Vitals  ICU Vital Signs Last 24 Hrs  T(C): 37.7 (15 Berny 2024 14:00), Max: 37.7 (15 Berny 2024 12:30)  T(F): 99.9 (15 Berny 2024 14:00), Max: 99.9 (15 Berny 2024 12:30)  HR: 106 (15 Berny 2024 14:00) (59 - 110)  BP: 103/65 (15 Berny 2024 14:00) (67/51 - 155/94)  BP(mean): 71 (15 Berny 2024 14:00) (47 - 116)  ABP: --  ABP(mean): --  RR: 20 (15 Berny 2024 14:00) (17 - 33)  SpO2: 97% (15 Berny 2024 14:00) (81% - 100%)    O2 Parameters below as of 15 Berny 2024 11:47  Patient On (Oxygen Delivery Method): ventilator            ## Physical Exam:  Gen: Adult male lying in bed, NAD  HEENT: NC/AT sclerae anicteric. ET tube in place  Resp: Mechanical breath sounds b/l  CV: S1, S2  Abd: Soft, + BS  Ext: +edema  Neuro: Sedated, unarousable    ## Vent Data  Mode: AC/ CMV (Assist Control/ Continuous Mandatory Ventilation)  RR (machine): 20  TV (machine): 450  FiO2: 40  PEEP: 10  ITime: 1  MAP: 14  PIP: 25      ## Labs:  Chem:  01-15    140  |  102  |  92<H>  ----------------------------<  106<H>  4.1   |  28  |  2.31<H>    Ca    8.6      15 Berny 2024 11:05  Phos  6.9     01-15  Mg     3.4     01-15    TPro  5.4<L>  /  Alb  1.2<L>  /  TBili  0.4  /  DBili  x   /  AST  48<H>  /  ALT  8<L>  /  AlkPhos  99  01-15    LIVER FUNCTIONS - ( 15 Berny 2024 11:05 )  Alb: 1.2 g/dL / Pro: 5.4 gm/dL / ALK PHOS: 99 U/L / ALT: 8 U/L / AST: 48 U/L / GGT: x           CBC:                        8.2    12.96 )-----------( 148      ( 15 Berny 2024 11:05 )             26.3     Coags:  PT/INR - ( 2024 16:00 )   PT: 14.1 sec;   INR: 1.19 ratio         PTT - ( 2024 16:00 )  PTT:46.7 sec    Urinalysis Basic - ( 15 Berny 2024 11:05 )    Color: x / Appearance: x / SG: x / pH: x  Gluc: 106 mg/dL / Ketone: x  / Bili: x / Urobili: x   Blood: x / Protein: x / Nitrite: x   Leuk Esterase: x / RBC: x / WBC x   Sq Epi: x / Non Sq Epi: x / Bacteria: x        ## Cardiac        ## Blood Gas  ABG - ( 2024 19:39 )  pH, Arterial: 7.44  pH, Blood: x     /  pCO2: 43    /  pO2: 194   / HCO3: 29    / Base Excess: 4.6   /  SaO2: 99.2                #I/Os  I&O's Detail    2024 07:01  -  15 Berny 2024 07:00  --------------------------------------------------------  IN:    dextrose 5% + lactated ringers: 1200 mL    Jevity 1.2: 140 mL    Norepinephrine: 430 mL    Norepinephrine: 419.9 mL    Propofol: 30 mL    Vasopressin: 84 mL  Total IN: 2303.9 mL    OUT:    Indwelling Catheter - Urethral (mL): 1265 mL    Lactated Ringers: 0 mL  Total OUT: 1265 mL    Total NET: 1038.9 mL      15 Berny 2024 07:01  -  15 Berny 2024 14:30  --------------------------------------------------------  IN:    dextrose 5% + lactated ringers: 225 mL    Jevity 1.2: 210 mL    Norepinephrine: 204.7 mL    Vasopressin: 42 mL  Total IN: 681.7 mL    OUT:    Indwelling Catheter - Urethral (mL): 355 mL    Propofol: 0 mL  Total OUT: 355 mL    Total NET: 326.7 mL          ## Imaging:    ## Medications:  MEDICATIONS  (STANDING):  albuterol/ipratropium for Nebulization 3 milliLiter(s) Nebulizer every 6 hours  aspirin  chewable 81 milliGRAM(s) Enteral Tube daily  azithromycin  IVPB 500 milliGRAM(s) IV Intermittent every 24 hours  cefTRIAXone   IVPB      cefTRIAXone   IVPB 1000 milliGRAM(s) IV Intermittent every 24 hours  chlorhexidine 0.12% Liquid 15 milliLiter(s) Oral Mucosa every 12 hours  chlorhexidine 2% Cloths 1 Application(s) Topical <User Schedule>  heparin   Injectable 5000 Unit(s) SubCutaneous every 8 hours  norepinephrine Infusion 0.05 MICROgram(s)/kG/Min (3.9 mL/Hr) IV Continuous <Continuous>  polyethylene glycol 3350 17 Gram(s) Oral daily  propofol Infusion 10 MICROgram(s)/kG/Min (4.63 mL/Hr) IV Continuous <Continuous>  sodium chloride 3%  Inhalation 4 milliLiter(s) Inhalation every 6 hours  valproic  acid Syrup 1250 milliGRAM(s) Oral two times a day  vasopressin Infusion 0.04 Unit(s)/Min (6 mL/Hr) IV Continuous <Continuous>    MEDICATIONS  (PRN):  sodium chloride 0.9% lock flush 10 milliLiter(s) IV Push every 1 hour PRN Pre/post blood products, medications, blood draw, and to maintain line patency

## 2024-01-15 NOTE — INITIAL ORGAN DONATION REFERRAL - NSORGANDONATIONCLINICALTRIGGER_GEN_ALL_CORE
[place] Coma Scale is less than or equal to 5 [place] Coma Scale is less than or equal to 5 [place] Coma Scale is less than or equal to 5

## 2024-01-15 NOTE — PROCEDURE NOTE - NSINDICATIONS_GEN_A_CORE
respiratory failure
critical patient/monitoring purposes
critical illness/emergency venous access/hemodynamic monitoring

## 2024-01-15 NOTE — CHART NOTE - NSCHARTNOTEFT_GEN_A_CORE
:  MELISSA Ruiz dr   Indication:  SHOCK    PROCEDURE:  [x ] LIMITED ECHO  [x ] LIMITED CHEST  [ ] LIMITED RETROPERITONEAL  [ ] LIMITED ABDOMINAL  [ ] LIMITED DVT  [ ] NEEDLE GUIDANCE VASCULAR  [ ] NEEDLE GUIDANCE THORACENTESIS  [ ] NEEDLE GUIDANCE PARACENTESIS  [ ] NEEDLE GUIDANCE PERICARDIOCENTESIS  [ ] OTHER    FINDINGS:  Chest: focal B lines on left with no effusion. A lines on right     ECHO: technically limited study. LV>RV unable to eval LV function  No pericardial effusion  IVC: plethoric       INTERPRETATION:  lung exam with intersitial pattern on R   technically limited cardiac exam  IVC plethoric

## 2024-01-16 NOTE — PROGRESS NOTE ADULT - ASSESSMENT
67 y/o M w/dementia, seizure disorder, hydrocephalus s/p  shunt, prior TBI admitted for AMS and acute hypoxemic respiratory failure. Respiratory failure likely secondary to PNA and acute pulmonary edema. Hypotension likely secondary to severe sepsis with septic shock. Presumed SOFIA ( unknown baseline) likely ATN.    - Sedation as needed goal RASS -1 to 0  - Continue mechanical ventilation wean as tolerated  - Titrate pressors as needed goal MAP >= 65  - Diuresis goal net negative 1-2 L daily   - Trend Cr, avoid nephrotoxins  - Broad spectrum abx, follow up cultures  - DVT prophylaxis  - Full code    I have personally provided 35 minutes of attending critical care time excluding procedures.

## 2024-01-16 NOTE — DIETITIAN INITIAL EVALUATION ADULT - REASON INDICATOR FOR ASSESSMENT
Pt seen for nutrition consult for pressure injury stage II or greater, consult for MST score 2 or greater, ICU admission, tube feeding assessment

## 2024-01-16 NOTE — PHYSICAL THERAPY INITIAL EVALUATION ADULT - MODALITIES TREATMENT COMMENTS
1. Right ischial tuberosity-stage 3 pressure injury 2. Left gluteus- stage 2 pressure injury 3. left hip skin tear 4. Left ischial tuberosity- stage 3 pressure injury 5. Left leg lateral aspect- stage 2 pressure injury- closed blister 6. Left leg lateral aspect above lateral malleolus stage 2 pressure injury-open blister - wound measurements in assessment section, dressing recommendations in POC section.

## 2024-01-16 NOTE — DIETITIAN INITIAL EVALUATION ADULT - ENERGY INTAKE
Tube feed infusing @ goal rate at time of visit. Tube feed as ordered Jevity 1.5 @ 40ml/hr x 18 hours provides 720ml total volume, 1080kcal, 46g protein, 457ml water meeting <75% of needs as estimated below

## 2024-01-16 NOTE — PHYSICAL THERAPY INITIAL EVALUATION ADULT - ADDITIONAL COMMENTS
as patient is intubated , unable to obtain history, attempt to reach at phone number  provided, unable to reach anyone

## 2024-01-16 NOTE — DIETITIAN INITIAL EVALUATION ADULT - PERTINENT MEDS FT
MEDICATIONS  (STANDING):  albuterol/ipratropium for Nebulization 3 milliLiter(s) Nebulizer every 6 hours  aspirin  chewable 81 milliGRAM(s) Enteral Tube daily  buMETAnide Infusion 2 mG/Hr (10 mL/Hr) IV Continuous <Continuous>  cefTRIAXone   IVPB      cefTRIAXone   IVPB 1000 milliGRAM(s) IV Intermittent every 24 hours  chlorhexidine 0.12% Liquid 15 milliLiter(s) Oral Mucosa every 12 hours  chlorhexidine 2% Cloths 1 Application(s) Topical <User Schedule>  heparin   Injectable 5000 Unit(s) SubCutaneous every 8 hours  norepinephrine Infusion 0.05 MICROgram(s)/kG/Min (3.9 mL/Hr) IV Continuous <Continuous>  polyethylene glycol 3350 17 Gram(s) Oral daily  propofol Infusion 10 MICROgram(s)/kG/Min (4.63 mL/Hr) IV Continuous <Continuous>  sodium chloride 3%  Inhalation 4 milliLiter(s) Inhalation every 6 hours  valproic  acid Syrup 1250 milliGRAM(s) Oral two times a day  vasopressin Infusion 0.04 Unit(s)/Min (6 mL/Hr) IV Continuous <Continuous>    MEDICATIONS  (PRN):  sodium chloride 0.9% lock flush 10 milliLiter(s) IV Push every 1 hour PRN Pre/post blood products, medications, blood draw, and to maintain line patency

## 2024-01-16 NOTE — DIETITIAN INITIAL EVALUATION ADULT - PERTINENT LABORATORY DATA
01-16    140  |  101  |  89<H>  ----------------------------<  126<H>  3.5   |  29  |  2.28<H>    Ca    8.2<L>      16 Jan 2024 03:30  Phos  6.7     01-16  Mg     3.3     01-16    TPro  5.2<L>  /  Alb  1.1<L>  /  TBili  0.3  /  DBili  x   /  AST  45<H>  /  ALT  8<L>  /  AlkPhos  107  01-16  POCT Blood Glucose.: 146 mg/dL (01-16-24 @ 11:27)  A1C with Estimated Average Glucose Result: 4.8 % (01-14-24 @ 19:00)

## 2024-01-16 NOTE — DIETITIAN INITIAL EVALUATION ADULT - NSFNSGIIOFT_GEN_A_CORE
01-15-24 @ 07:01  -  01-16-24 @ 07:00  --------------------------------------------------------  OUT:  Total OUT: 0 mL    Total NET: 645 mL

## 2024-01-16 NOTE — DIETITIAN INITIAL EVALUATION ADULT - NS FNS DIET ORDER
Diet, NPO with Tube Feed:   Tube Feeding Modality: Nasogastric  Jevity 1.5 [employer]  Total Volume for 24 Hours (mL): 960  Continuous  Starting Tube Feed Rate {mL per Hour}: 10  Increase Tube Feed Rate by (mL): 10     Every 4 hours  Until Goal Tube Feed Rate (mL per Hour): 40  Tube Feed Duration (in Hours): 24  Tube Feed Start Time: 17:00  Tube Feed Stop Time: 11:00 (01-14-24 @ 15:30) [Active]

## 2024-01-16 NOTE — DIETITIAN INITIAL EVALUATION ADULT - ENTERAL
Taking into account hyperphosphatemia recommend change tube feed to Nepro @ goal rate 60ml/hr x 18 hours which provides 1080ml total volume, 1944kcal, 87.5g protein, 785ml water better meeting needs as estimated above

## 2024-01-16 NOTE — PROGRESS NOTE ADULT - SUBJECTIVE AND OBJECTIVE BOX
HPI:  Pt is a 68-year-old male with a PMHX of seizure disorder, dementia, hydrocephalus status post  shunt, previous traumatic brain injury, and wheelchair-bound at baseline is interactive presents to ED today brought in by home health aid for increased lethargy and decreased PO intake for the past week. Today pt become altered and unresponsive. EMS called upon arrival pt was hypoglycemic, hypotensive, and hypoxemic. Per home health aid pt has been having worsening difficulty with eating over the last couple of months and takes him a long time to swallow his pills. Per patient's mother, patient recently had Depakote dose decreased due to being more lethargic. Per ED pt with new bed sores on sacrum. H&P obtained by pts home health aid at bedside, pt nonresponsive to questions at this time.   In ED, pt hypoglycemic s/p 2 amps, hypoxemic on RA placed on NRB, and hypotensive (85/52) s/p 2L IVF with minimal improvement subsequently started on levophed.  S/p one dose vanc/zosyn. Labs remarkable for Glucose 39, Cr 3.08, , lactate 4.0, BNP 2942. VB.39/61/38/37.   Chest x-ray: Extensive left lung infiltrate and effusion with slight infiltrate at right base medially. Right ventricular shunt.  CTH noncon: Ventriculostomy noted in situ without evidence for hydrocephalus.  CT chest: Large consolidative/atelectatic changes involving the left lung. Small left pleural effusion. Complete opacification of the left lower lobe bronchus. Secretions within the left mainstem bronchus.   CT abd/pelvis: exophytic lesion at the mid to lower pole of the right kidney and additional bilateral renal cysts    Subjective:   Pt seen and examined at bedside. Pt awake and alert, laying comfortably in bed with NRB in place. Pt not answering to questions at this time, opening eyes to voice.      (2024 12:59)      24 hr events: No acute events.     ## ROS:  [x ] unable to obtain    ## Vitals  ICU Vital Signs Last 24 Hrs  T(C): 37.1 (2024 10:00), Max: 37.8 (15 Berny 2024 15:00)  T(F): 98.8 (2024 10:00), Max: 100 (15 Berny 2024 15:00)  HR: 88 (2024 10:00) (79 - 110)  BP: 116/64 (2024 10:00) (58/24 - 134/61)  BP(mean): 78 (2024 10:00) (36 - 90)  ABP: 129/71 (2024 10:00) (97/86 - 151/76)  ABP(mean): 93 (2024 10:00) (65 - 172)  RR: 18 (2024 10:00) (14 - 30)  SpO2: 100% (2024 10:00) (95% - 100%)    O2 Parameters below as of 2024 05:04  Patient On (Oxygen Delivery Method): ventilator            ## Physical Exam:  Gen: Adult male lying in bed, NAD  HEENT: NC/AT sclerae anicteric. ET tube in place  Resp: Mechanical breath sounds b/l  CV: S1, S2  Abd: Soft, + BS  Ext: +edema  Neuro: Sedated, unarousable    ## Vent Data  Mode: AC/ CMV (Assist Control/ Continuous Mandatory Ventilation)  RR (machine): 10  TV (machine): 450  FiO2: 40  PEEP: 10  ITime: 1  MAP: 13  PIP: 24      ## Labs:  Chem:      140  |  101  |  89<H>  ----------------------------<  126<H>  3.5   |  29  |  2.28<H>    Ca    8.2<L>      2024 03:30  Phos  6.7       Mg     3.3     -    TPro  5.2<L>  /  Alb  1.1<L>  /  TBili  0.3  /  DBili  x   /  AST  45<H>  /  ALT  8<L>  /  AlkPhos  107  -16    LIVER FUNCTIONS - ( 2024 03:30 )  Alb: 1.1 g/dL / Pro: 5.2 gm/dL / ALK PHOS: 107 U/L / ALT: 8 U/L / AST: 45 U/L / GGT: x           CBC:                        7.4    28.52 )-----------( 118      ( 2024 03:30 )             22.8     Coags:  PT/INR - ( 2024 16:00 )   PT: 14.1 sec;   INR: 1.19 ratio         PTT - ( 2024 16:00 )  PTT:46.7 sec    Urinalysis Basic - ( 2024 03:30 )    Color: x / Appearance: x / SG: x / pH: x  Gluc: 126 mg/dL / Ketone: x  / Bili: x / Urobili: x   Blood: x / Protein: x / Nitrite: x   Leuk Esterase: x / RBC: x / WBC x   Sq Epi: x / Non Sq Epi: x / Bacteria: x        ## Cardiac        ## Blood Gas  ABG - ( 2024 08:22 )  pH, Arterial: 7.60  pH, Blood: x     /  pCO2: 32    /  pO2: 93    / HCO3: 31    / Base Excess: 9.4   /  SaO2: 97.9                #I/Os  I&O's Detail    15 Berny 2024 07:01  -  2024 07:00  --------------------------------------------------------  IN:    dextrose 5% + lactated ringers: 225 mL    Jevity 1.2: 205 mL    Jevity 1.5: 440 mL    Norepinephrine: 567.3 mL    Vasopressin: 126 mL  Total IN: 1563.3 mL    OUT:    Indwelling Catheter - Urethral (mL): 1955 mL    Propofol: 0 mL  Total OUT: 1955 mL    Total NET: -391.7 mL          ## Imaging:    ## Medications:  MEDICATIONS  (STANDING):  albuterol/ipratropium for Nebulization 3 milliLiter(s) Nebulizer every 6 hours  aspirin  chewable 81 milliGRAM(s) Enteral Tube daily  buMETAnide Infusion 2 mG/Hr (10 mL/Hr) IV Continuous <Continuous>  cefTRIAXone   IVPB      cefTRIAXone   IVPB 1000 milliGRAM(s) IV Intermittent every 24 hours  chlorhexidine 0.12% Liquid 15 milliLiter(s) Oral Mucosa every 12 hours  chlorhexidine 2% Cloths 1 Application(s) Topical <User Schedule>  heparin   Injectable 5000 Unit(s) SubCutaneous every 8 hours  norepinephrine Infusion 0.05 MICROgram(s)/kG/Min (3.9 mL/Hr) IV Continuous <Continuous>  polyethylene glycol 3350 17 Gram(s) Oral daily  propofol Infusion 10 MICROgram(s)/kG/Min (4.63 mL/Hr) IV Continuous <Continuous>  sodium chloride 3%  Inhalation 4 milliLiter(s) Inhalation every 6 hours  valproic  acid Syrup 1250 milliGRAM(s) Oral two times a day  vasopressin Infusion 0.04 Unit(s)/Min (6 mL/Hr) IV Continuous <Continuous>    MEDICATIONS  (PRN):  sodium chloride 0.9% lock flush 10 milliLiter(s) IV Push every 1 hour PRN Pre/post blood products, medications, blood draw, and to maintain line patency

## 2024-01-17 NOTE — PROGRESS NOTE ADULT - SUBJECTIVE AND OBJECTIVE BOX
Patient is a 68y old  Male who presents with a chief complaint of respiratory failure (16 Jan 2024 12:30)    BRIEF HOSPITAL COURSE: 67 y/o M with PMHx of seizure disorder, hydrocephalus s/p  shunt, TBI, and wheelchair bound who presented to ER on 1/14 with unresponsiveness. Over the week prior to presentation, pt with increased lethargy and decreased PO intake. Pt was admitted to ICU with acute hypoxic respiratory failure and septic shock 2/2 PNA. Intubated in ICU later that day and subsequent bronchoscopy.    Events last 24 hours: Hgb 6.9 on AM labs. 1u PRBCs ordered. No signs of bleeding. Levophed requirement has come down significantly overnight.    PAST MEDICAL & SURGICAL HISTORY:  Seizure  Dementia    Review of Systems:  Unable to obtain. Intubated/sedated.    Medications:  cefTRIAXone   IVPB      cefTRIAXone   IVPB 1000 milliGRAM(s) IV Intermittent every 24 hours  bumETAnide Infusion 2 mG/Hr IV Continuous <Continuous>  norepinephrine Infusion 0.05 MICROgram(s)/kG/Min IV Continuous <Continuous>  albuterol/ipratropium for Nebulization 3 milliLiter(s) Nebulizer every 6 hours  sodium chloride 3%  Inhalation 4 milliLiter(s) Inhalation every 6 hours  valproic  acid Syrup 1250 milliGRAM(s) Oral two times a day  aspirin  chewable 81 milliGRAM(s) Enteral Tube daily  heparin   Injectable 5000 Unit(s) SubCutaneous every 8 hours  polyethylene glycol 3350 17 Gram(s) Oral daily  vasopressin Infusion 0.04 Unit(s)/Min IV Continuous <Continuous>  sodium chloride 0.9% lock flush 10 milliLiter(s) IV Push every 1 hour PRN  chlorhexidine 0.12% Liquid 15 milliLiter(s) Oral Mucosa every 12 hours  chlorhexidine 2% Cloths 1 Application(s) Topical <User Schedule>    Mode: AC/ CMV (Assist Control/ Continuous Mandatory Ventilation)  RR (machine): 10  TV (machine): 450  FiO2: 40  PEEP: 10  ITime: 1  MAP: 10  PIP: 21    ICU Vital Signs Last 24 Hrs  T(C): 36.9 (17 Jan 2024 05:00), Max: 37.6 (16 Jan 2024 05:30)  T(F): 98.4 (17 Jan 2024 05:00), Max: 99.7 (16 Jan 2024 05:30)  HR: 83 (17 Jan 2024 05:00) (71 - 96)  BP: 116/64 (16 Jan 2024 10:00) (116/64 - 116/64)  BP(mean): 78 (16 Jan 2024 10:00) (78 - 78)  ABP: 108/59 (17 Jan 2024 05:00) (93/77 - 139/69)  ABP(mean): 76 (17 Jan 2024 05:00) (68 - 172)  RR: 21 (17 Jan 2024 05:00) (11 - 30)  SpO2: 98% (17 Jan 2024 05:00) (92% - 100%)    O2 Parameters below as of 16 Jan 2024 19:05  Patient On (Oxygen Delivery Method): ventilator    ABG - ( 16 Jan 2024 13:15 )  pH, Arterial: 7.48  pH, Blood: x     /  pCO2: 45    /  pO2: 85    / HCO3: 34    / Base Excess: 9.1   /  SaO2: 96.9      I&O's Detail    15 Berny 2024 07:01  -  16 Jan 2024 07:00  --------------------------------------------------------  IN:    dextrose 5% + lactated ringers: 225 mL    Jevity 1.2: 205 mL    Jevity 1.5: 440 mL    Norepinephrine: 567.3 mL    Vasopressin: 126 mL  Total IN: 1563.3 mL    OUT:    Indwelling Catheter - Urethral (mL): 1955 mL    Propofol: 0 mL  Total OUT: 1955 mL    Total NET: -391.7 mL    16 Jan 2024 07:01  -  17 Jan 2024 05:18  --------------------------------------------------------  IN:    Bumetanide: 160 mL    IV PiggyBack: 600 mL    Jevity 1.5: 400 mL    Nepro with Carb Steady: 240 mL    Norepinephrine: 219.4 mL    Vasopressin: 120 mL  Total IN: 1739.4 mL    OUT:    Indwelling Catheter - Urethral (mL): 2285 mL  Total OUT: 2285 mL    Total NET: -545.6 mL    LABS:                        6.7    28.83 )-----------( 94       ( 17 Jan 2024 04:15 )             22.1     01-17    139  |  99  |  96<H>  ----------------------------<  134<H>  3.5   |  31  |  2.16<H>    Ca    8.2<L>      17 Jan 2024 02:30  Phos  7.2     01-17  Mg     3.2     01-17    TPro  5.6<L>  /  Alb  1.1<L>  /  TBili  0.3  /  DBili  x   /  AST  46<H>  /  ALT  8<L>  /  AlkPhos  137<H>  01-17    CAPILLARY BLOOD GLUCOSE    POCT Blood Glucose.: 146 mg/dL (16 Jan 2024 11:27)    Urinalysis Basic - ( 17 Jan 2024 02:30 )  Color: x / Appearance: x / SG: x / pH: x  Gluc: 134 mg/dL / Ketone: x  / Bili: x / Urobili: x   Blood: x / Protein: x / Nitrite: x   Leuk Esterase: x / RBC: x / WBC x   Sq Epi: x / Non Sq Epi: x / Bacteria: x    CULTURES:  Culture Results:   Normal Respiratory Samantha present (01-14 @ 17:15)  Rapid RVP Result: NotDetec (01-14 @ 13:20)  Culture Results:   >100,000 CFU/ml Coag Negative Staphylococcus "Susceptibilities not  performed" (01-14 @ 12:37)  Culture Results:   No growth at 48 Hours (01-14 @ 10:20)  Culture Results:   No growth at 48 Hours (01-14 @ 10:20)    Physical Examination:    General: In no acute distress.    HEENT: Pupils equal, reactive to light. Symmetric. No scleral icterus or injection.    PULM: Diminished breath sounds on left.    NECK: Supple, no lymphadenopathy, trachea midline.    CVS: Regular rate and rhythm, no murmurs appreciated, +s1/s2.    ABD: Soft, nondistended, nontender, normoactive bowel sounds.    EXT: No edema, nontender.    SKIN: Warm and well perfused, no rashes noted.    NEURO: Sedated.    CRITICAL CARE TIME SPENT: 36 minutes    Date of entry of this note is equal to the date of services rendered.    Patient is a 68y old  Male who presents with a chief complaint of respiratory failure (16 Jan 2024 12:30)    BRIEF HOSPITAL COURSE: 69 y/o M with PMHx of seizure disorder, hydrocephalus s/p  shunt, TBI, and wheelchair bound who presented to ER on 1/14 with unresponsiveness. Over the week prior to presentation, pt with increased lethargy and decreased PO intake. Pt was admitted to ICU with acute hypoxic respiratory failure and septic shock 2/2 PNA. Intubated in ICU later that day and subsequent bronchoscopy.    Events last 24 hours: Hgb 6.9 on AM labs. 1u PRBCs ordered. No signs of bleeding. Levophed requirement has come down overnight.    PAST MEDICAL & SURGICAL HISTORY:  Seizure  Dementia    Review of Systems:  Unable to obtain. Intubated/sedated.    Medications:  cefTRIAXone   IVPB      cefTRIAXone   IVPB 1000 milliGRAM(s) IV Intermittent every 24 hours  bumETAnide Infusion 2 mG/Hr IV Continuous <Continuous>  norepinephrine Infusion 0.05 MICROgram(s)/kG/Min IV Continuous <Continuous>  albuterol/ipratropium for Nebulization 3 milliLiter(s) Nebulizer every 6 hours  sodium chloride 3%  Inhalation 4 milliLiter(s) Inhalation every 6 hours  valproic  acid Syrup 1250 milliGRAM(s) Oral two times a day  aspirin  chewable 81 milliGRAM(s) Enteral Tube daily  heparin   Injectable 5000 Unit(s) SubCutaneous every 8 hours  polyethylene glycol 3350 17 Gram(s) Oral daily  vasopressin Infusion 0.04 Unit(s)/Min IV Continuous <Continuous>  sodium chloride 0.9% lock flush 10 milliLiter(s) IV Push every 1 hour PRN  chlorhexidine 0.12% Liquid 15 milliLiter(s) Oral Mucosa every 12 hours  chlorhexidine 2% Cloths 1 Application(s) Topical <User Schedule>    Mode: AC/ CMV (Assist Control/ Continuous Mandatory Ventilation)  RR (machine): 10  TV (machine): 450  FiO2: 40  PEEP: 10  ITime: 1  MAP: 10  PIP: 21    ICU Vital Signs Last 24 Hrs  T(C): 36.9 (17 Jan 2024 05:00), Max: 37.6 (16 Jan 2024 05:30)  T(F): 98.4 (17 Jan 2024 05:00), Max: 99.7 (16 Jan 2024 05:30)  HR: 83 (17 Jan 2024 05:00) (71 - 96)  BP: 116/64 (16 Jan 2024 10:00) (116/64 - 116/64)  BP(mean): 78 (16 Jan 2024 10:00) (78 - 78)  ABP: 108/59 (17 Jan 2024 05:00) (93/77 - 139/69)  ABP(mean): 76 (17 Jan 2024 05:00) (68 - 172)  RR: 21 (17 Jan 2024 05:00) (11 - 30)  SpO2: 98% (17 Jan 2024 05:00) (92% - 100%)    O2 Parameters below as of 16 Jan 2024 19:05  Patient On (Oxygen Delivery Method): ventilator    ABG - ( 16 Jan 2024 13:15 )  pH, Arterial: 7.48  pH, Blood: x     /  pCO2: 45    /  pO2: 85    / HCO3: 34    / Base Excess: 9.1   /  SaO2: 96.9      I&O's Detail    15 Berny 2024 07:01  -  16 Jan 2024 07:00  --------------------------------------------------------  IN:    dextrose 5% + lactated ringers: 225 mL    Jevity 1.2: 205 mL    Jevity 1.5: 440 mL    Norepinephrine: 567.3 mL    Vasopressin: 126 mL  Total IN: 1563.3 mL    OUT:    Indwelling Catheter - Urethral (mL): 1955 mL    Propofol: 0 mL  Total OUT: 1955 mL    Total NET: -391.7 mL    16 Jan 2024 07:01  -  17 Jan 2024 05:18  --------------------------------------------------------  IN:    Bumetanide: 160 mL    IV PiggyBack: 600 mL    Jevity 1.5: 400 mL    Nepro with Carb Steady: 240 mL    Norepinephrine: 219.4 mL    Vasopressin: 120 mL  Total IN: 1739.4 mL    OUT:    Indwelling Catheter - Urethral (mL): 2285 mL  Total OUT: 2285 mL    Total NET: -545.6 mL    LABS:                        6.7    28.83 )-----------( 94       ( 17 Jan 2024 04:15 )             22.1     01-17    139  |  99  |  96<H>  ----------------------------<  134<H>  3.5   |  31  |  2.16<H>    Ca    8.2<L>      17 Jan 2024 02:30  Phos  7.2     01-17  Mg     3.2     01-17    TPro  5.6<L>  /  Alb  1.1<L>  /  TBili  0.3  /  DBili  x   /  AST  46<H>  /  ALT  8<L>  /  AlkPhos  137<H>  01-17    CAPILLARY BLOOD GLUCOSE    POCT Blood Glucose.: 146 mg/dL (16 Jan 2024 11:27)    Urinalysis Basic - ( 17 Jan 2024 02:30 )  Color: x / Appearance: x / SG: x / pH: x  Gluc: 134 mg/dL / Ketone: x  / Bili: x / Urobili: x   Blood: x / Protein: x / Nitrite: x   Leuk Esterase: x / RBC: x / WBC x   Sq Epi: x / Non Sq Epi: x / Bacteria: x    CULTURES:  Culture Results:   Normal Respiratory Samantha present (01-14 @ 17:15)  Rapid RVP Result: NotDetec (01-14 @ 13:20)  Culture Results:   >100,000 CFU/ml Coag Negative Staphylococcus "Susceptibilities not  performed" (01-14 @ 12:37)  Culture Results:   No growth at 48 Hours (01-14 @ 10:20)  Culture Results:   No growth at 48 Hours (01-14 @ 10:20)    Physical Examination:    General: In no acute distress.    HEENT: Pupils equal, reactive to light. Symmetric. No scleral icterus or injection.    PULM: Diminished breath sounds on left.    NECK: Supple, no lymphadenopathy, trachea midline.    CVS: Regular rate and rhythm, no murmurs appreciated, +s1/s2.    ABD: Soft, nondistended, nontender, normoactive bowel sounds.    EXT: No edema, nontender.    SKIN: Warm and well perfused, no rashes noted.    NEURO: Sedated.    CRITICAL CARE TIME SPENT: 36 minutes    Date of entry of this note is equal to the date of services rendered.

## 2024-01-17 NOTE — PROGRESS NOTE ADULT - ASSESSMENT
67 y/o M with PMHx of seizure disorder, hydrocephalus s/p  shunt, TBI, and wheelchair bound admitted with:    Acute hypoxic respiratory failure  Septic shock  Lobar PNA  SOFIA  Anemia    PLAN:  - Actively titrating levophed to maintain MAP > 65.  - Fixed dose vasopressin.  - Not requiring any sedation, no mental status.  - Actively titrating ventilator settings to maintain SpO2 > 92% and adequate minute ventilation.  - Tube feeds as tolerated.  - GI prophylaxis.  - Monitor renal function.  - Bumex infusion at 2mg/hr.  - Potassium replaced.  - Abx coverage with rocephin.  - Transfuse 1u PRBCs.  - No signs of active bleeding. Hgb was 9.0 on admission. Slowly drifted down.  - Continue chemical DVT prophylaxis at this time. 67 y/o M with PMHx of seizure disorder, hydrocephalus s/p  shunt, TBI, and wheelchair bound admitted with:    Acute hypoxic respiratory failure  Septic shock  Lobar PNA  SOFIA  Anemia    PLAN:  - Actively titrating levophed to maintain MAP > 65.  - Fixed dose vasopressin.  - Not requiring any sedation, no mental status.  - Actively titrating ventilator settings to maintain SpO2 > 92% and adequate minute ventilation.  - Tube feeds as tolerated.  - GI prophylaxis.  - Monitor renal function.  - Bumex infusion at 1mg/hr.  - Potassium replaced.  - Abx coverage with rocephin.  - Transfuse 1u PRBCs.  - No signs of active bleeding. Hgb was 9.0 on admission. Slowly drifted down.  - Continue chemical DVT prophylaxis at this time. 69 y/o M with PMHx of seizure disorder, hydrocephalus s/p  shunt, TBI, and wheelchair bound admitted with:    Acute hypoxic respiratory failure  Septic shock  Lobar PNA  SOFIA  Anemia    PLAN:  - Actively titrating levophed to maintain MAP > 65.  - Fixed dose vasopressin.  - Not requiring any sedation, no mental status.  - Actively titrating ventilator settings to maintain SpO2 > 92% and adequate minute ventilation.  - Tube feeds as tolerated.  - GI prophylaxis.  - Monitor renal function.  - Bumex infusion at 2mg/hr.  - Potassium replaced.  - Abx coverage with rocephin.  - Transfuse 1u PRBCs.  - No signs of active bleeding. Hgb was 9.0 on admission. Slowly drifted down.  - Continue chemical DVT prophylaxis at this time.

## 2024-01-17 NOTE — PROGRESS NOTE ADULT - SUBJECTIVE AND OBJECTIVE BOX
HPI:  Pt is a 68-year-old male with a PMHX of seizure disorder, dementia, hydrocephalus status post  shunt, previous traumatic brain injury, and wheelchair-bound at baseline is interactive presents to ED today brought in by home health aid for increased lethargy and decreased PO intake for the past week. Today pt become altered and unresponsive. EMS called upon arrival pt was hypoglycemic, hypotensive, and hypoxemic. Per home health aid pt has been having worsening difficulty with eating over the last couple of months and takes him a long time to swallow his pills. Per patient's mother, patient recently had Depakote dose decreased due to being more lethargic. Per ED pt with new bed sores on sacrum. H&P obtained by pts home health aid at bedside, pt nonresponsive to questions at this time.   In ED, pt hypoglycemic s/p 2 amps, hypoxemic on RA placed on NRB, and hypotensive (85/52) s/p 2L IVF with minimal improvement subsequently started on levophed.  S/p one dose vanc/zosyn. Labs remarkable for Glucose 39, Cr 3.08, , lactate 4.0, BNP 2942. VB.39/61/38/37.   Chest x-ray: Extensive left lung infiltrate and effusion with slight infiltrate at right base medially. Right ventricular shunt.  CTH noncon: Ventriculostomy noted in situ without evidence for hydrocephalus.  CT chest: Large consolidative/atelectatic changes involving the left lung. Small left pleural effusion. Complete opacification of the left lower lobe bronchus. Secretions within the left mainstem bronchus.   CT abd/pelvis: exophytic lesion at the mid to lower pole of the right kidney and additional bilateral renal cysts    Subjective:   Pt seen and examined at bedside. Pt awake and alert, laying comfortably in bed with NRB in place. Pt not answering to questions at this time, opening eyes to voice.      (2024 12:59)      24 hr events: No acute events.     ## ROS:  [x ] unable to obtain      ## Vitals  ICU Vital Signs Last 24 Hrs  T(C): 37.3 (2024 12:40), Max: 37.3 (2024 12:40)  T(F): 99.1 (2024 12:40), Max: 99.1 (2024 12:40)  HR: 88 (2024 12:40) (71 - 95)  BP: --  BP(mean): --  ABP: 94/50 (2024 12:40) (90/49 - 157/82)  ABP(mean): 64 (2024 12:40) (61 - 111)  RR: 18 (2024 12:40) (11 - 22)  SpO2: 98% (2024 12:40) (92% - 100%)    O2 Parameters below as of 2024 08:01  Patient On (Oxygen Delivery Method): ventilator            ## Physical Exam:  Gen: Adult male lying in bed, NAD  HEENT: NC/AT sclerae anicteric. ET tube in place  Resp: Mechanical breath sounds b/l  CV: S1, S2  Abd: Soft, + BS  Ext: +edema  Neuro: Sedated, unarousable    ## Vent Data  Mode: AC/ CMV (Assist Control/ Continuous Mandatory Ventilation)  RR (machine): 10  TV (machine): 450  FiO2: 40  PEEP: 10  ITime: 0.9  MAP: 13  PIP: 22      ## Labs:  Chem:      139  |  99  |  96<H>  ----------------------------<  134<H>  3.5   |  31  |  2.16<H>    Ca    8.2<L>      2024 02:30  Phos  7.2       Mg     3.2     -17    TPro  5.6<L>  /  Alb  1.1<L>  /  TBili  0.3  /  DBili  x   /  AST  46<H>  /  ALT  8<L>  /  AlkPhos  137<H>  17    LIVER FUNCTIONS - ( 2024 02:30 )  Alb: 1.1 g/dL / Pro: 5.6 gm/dL / ALK PHOS: 137 U/L / ALT: 8 U/L / AST: 46 U/L / GGT: x           CBC:                        6.7    28.83 )-----------( 94       ( 2024 04:15 )             22.1     Coags:      Urinalysis Basic - ( 2024 02:30 )    Color: x / Appearance: x / SG: x / pH: x  Gluc: 134 mg/dL / Ketone: x  / Bili: x / Urobili: x   Blood: x / Protein: x / Nitrite: x   Leuk Esterase: x / RBC: x / WBC x   Sq Epi: x / Non Sq Epi: x / Bacteria: x        ## Cardiac        ## Blood Gas  ABG - ( 2024 13:15 )  pH, Arterial: 7.48  pH, Blood: x     /  pCO2: 45    /  pO2: 85    / HCO3: 34    / Base Excess: 9.1   /  SaO2: 96.9                #I/Os  I&O's Detail    2024 07:01  -  2024 07:00  --------------------------------------------------------  IN:    Bumetanide: 200 mL    IV PiggyBack: 600 mL    Jevity 1.5: 400 mL    Nepro with Carb Steady: 400 mL    Norepinephrine: 256.6 mL    PRBCs (Packed Red Blood Cells): 322 mL    Vasopressin: 144 mL  Total IN: 2322.6 mL    OUT:    Indwelling Catheter - Urethral (mL): 2735 mL  Total OUT: 2735 mL    Total NET: -412.4 mL      2024 07:01  -  2024 12:56  --------------------------------------------------------  IN:    Bumetanide: 20 mL    Bumetanide: 40 mL    Nepro with Carb Steady: 170 mL    Norepinephrine: 29.6 mL    Vasopressin: 24 mL  Total IN: 283.6 mL    OUT:    Indwelling Catheter - Urethral (mL): 1050 mL  Total OUT: 1050 mL    Total NET: -766.4 mL          ## Imaging:    ## Medications:  MEDICATIONS  (STANDING):  albuterol/ipratropium for Nebulization 3 milliLiter(s) Nebulizer every 6 hours  aspirin  chewable 81 milliGRAM(s) Enteral Tube daily  buMETAnide Infusion 4 mG/Hr (20 mL/Hr) IV Continuous <Continuous>  cefTRIAXone   IVPB      cefTRIAXone   IVPB 1000 milliGRAM(s) IV Intermittent every 24 hours  chlorhexidine 0.12% Liquid 15 milliLiter(s) Oral Mucosa every 12 hours  chlorhexidine 2% Cloths 1 Application(s) Topical <User Schedule>  heparin   Injectable 5000 Unit(s) SubCutaneous every 8 hours  norepinephrine Infusion 0.05 MICROgram(s)/kG/Min (3.9 mL/Hr) IV Continuous <Continuous>  polyethylene glycol 3350 17 Gram(s) Oral daily  sodium chloride 3%  Inhalation 4 milliLiter(s) Inhalation every 6 hours  valproic  acid Syrup 1250 milliGRAM(s) Oral two times a day  vasopressin Infusion 0.04 Unit(s)/Min (6 mL/Hr) IV Continuous <Continuous>    MEDICATIONS  (PRN):  sodium chloride 0.9% lock flush 10 milliLiter(s) IV Push every 1 hour PRN Pre/post blood products, medications, blood draw, and to maintain line patency

## 2024-01-18 NOTE — PROGRESS NOTE ADULT - ASSESSMENT
69 y/o M w/dementia, seizure disorder, hydrocephalus s/p  shunt, prior TBI admitted for AMS and acute hypoxemic respiratory failure. Respiratory failure likely secondary to PNA and acute pulmonary edema. Hypotension likely secondary to severe sepsis with septic shock. Presumed SOFIA ( unknown baseline) likely ATN. Possible depakote toxicity as etiology for AMS, level is very supratherapeutic.    - Sedation as needed goal RASS -1 to 0  - Stop depakote  - Continue mechanical ventilation wean as tolerated  - Titrate pressors as needed goal MAP >= 65  - Diuresis goal net negative 1-2 L daily   - Trend Cr, avoid nephrotoxins  - Broad spectrum abx, follow up cultures  - DVT prophylaxis  - Full code    I have personally provided 35 minutes of attending critical care time excluding procedures.

## 2024-01-18 NOTE — PHARMACOTHERAPY INTERVENTION NOTE - COMMENTS
Discussed with Dr. Lora about discontinuing ceftriaxone and monitoring the patient off of antibiotic. Patient will complete total of 5 days of PNA treatment today. Patient has been afebrile, and vasopressor requirements went down. 
Recommended obtaining ammonia level due to risk of hyperammonemia associated with valproic acid.
Recommended obtaining valproic acid free level as the albumin is 1.1 g/dL.

## 2024-01-18 NOTE — PROGRESS NOTE ADULT - SUBJECTIVE AND OBJECTIVE BOX
Patient is a 68y old  Male who presents with a chief complaint of septic shock (17 Jan 2024 12:55)    BRIEF HOSPITAL COURSE: ***    Events last 24 hours: ***    PAST MEDICAL & SURGICAL HISTORY:  Seizure  Dementia    Review of Systems:  Unable to obtain. Intubated/poor mental status.    Medications:  cefTRIAXone   IVPB      cefTRIAXone   IVPB 1000 milliGRAM(s) IV Intermittent every 24 hours  buMETAnide Infusion 4 mG/Hr IV Continuous <Continuous>  norepinephrine Infusion 0.05 MICROgram(s)/kG/Min IV Continuous <Continuous>  albuterol/ipratropium for Nebulization 3 milliLiter(s) Nebulizer every 6 hours  sodium chloride 3%  Inhalation 4 milliLiter(s) Inhalation every 6 hours  acetaminophen   Oral Liquid .. 650 milliGRAM(s) Oral every 6 hours PRN  valproic  acid Syrup 1250 milliGRAM(s) Oral two times a day  aspirin  chewable 81 milliGRAM(s) Enteral Tube daily  heparin   Injectable 5000 Unit(s) SubCutaneous every 8 hours  polyethylene glycol 3350 17 Gram(s) Oral daily  vasopressin Infusion 0.04 Unit(s)/Min IV Continuous <Continuous>  potassium chloride   Powder 40 milliEquivalent(s) Oral every 4 hours  sodium chloride 0.9% lock flush 10 milliLiter(s) IV Push every 1 hour PRN  chlorhexidine 0.12% Liquid 15 milliLiter(s) Oral Mucosa every 12 hours  chlorhexidine 2% Cloths 1 Application(s) Topical <User Schedule>    Mode: AC/ CMV (Assist Control/ Continuous Mandatory Ventilation)  RR (machine): 10  TV (machine): 450  FiO2: 40  PEEP: 8  ITime: 0.9  MAP: 10  PIP: 22    ICU Vital Signs Last 24 Hrs  T(C): 38.4 (17 Jan 2024 23:00), Max: 38.4 (17 Jan 2024 23:00)  T(F): 101.1 (17 Jan 2024 23:00), Max: 101.1 (17 Jan 2024 23:00)  HR: 100 (18 Jan 2024 00:57) (74 - 102)  BP: --  BP(mean): --  ABP: 108/59 (17 Jan 2024 23:00) (90/49 - 157/82)  ABP(mean): 75 (17 Jan 2024 23:00) (61 - 111)  RR: 18 (17 Jan 2024 23:00) (13 - 21)  SpO2: 99% (18 Jan 2024 00:57) (92% - 100%)    O2 Parameters below as of 18 Jan 2024 00:57  Patient On (Oxygen Delivery Method): ventilator    ABG - ( 16 Jan 2024 13:15 )  pH, Arterial: 7.48  pH, Blood: x     /  pCO2: 45    /  pO2: 85    / HCO3: 34    / Base Excess: 9.1   /  SaO2: 96.9      I&O's Detail    16 Jan 2024 07:01  -  17 Jan 2024 07:00  --------------------------------------------------------  IN:    Bumetanide: 200 mL    IV PiggyBack: 600 mL    Jevity 1.5: 400 mL    Nepro with Carb Steady: 400 mL    Norepinephrine: 256.6 mL    PRBCs (Packed Red Blood Cells): 322 mL    Vasopressin: 144 mL  Total IN: 2322.6 mL    OUT:    Indwelling Catheter - Urethral (mL): 2735 mL  Total OUT: 2735 mL    Total NET: -412.4 mL    17 Jan 2024 07:01  -  18 Jan 2024 00:58  --------------------------------------------------------  IN:    Bumetanide: 20 mL    Bumetanide: 260 mL    Enteral Tube Flush: 50 mL    IV PiggyBack: 250 mL    Nepro with Carb Steady: 470 mL    Norepinephrine: 84.4 mL    Vasopressin: 96 mL  Total IN: 1230.4 mL    OUT:    Indwelling Catheter - Urethral (mL): 3025 mL  Total OUT: 3025 mL    Total NET: -1794.6 mL    LABS:                        7.9    23.86 )-----------( 75       ( 17 Jan 2024 12:35 )             24.2     01-17    142  |  99  |  103<H>  ----------------------------<  161<H>  2.7<LL>   |  33<H>  |  2.22<H>    Ca    8.3<L>      17 Jan 2024 20:10  Phos  6.9     01-17  Mg     3.2     01-17    TPro  5.6<L>  /  Alb  1.1<L>  /  TBili  0.3  /  DBili  x   /  AST  46<H>  /  ALT  8<L>  /  AlkPhos  137<H>  01-17    CAPILLARY BLOOD GLUCOSE    POCT Blood Glucose.: 146 mg/dL (16 Jan 2024 11:27)    Urinalysis Basic - ( 17 Jan 2024 20:10 )  Color: x / Appearance: x / SG: x / pH: x  Gluc: 161 mg/dL / Ketone: x  / Bili: x / Urobili: x   Blood: x / Protein: x / Nitrite: x   Leuk Esterase: x / RBC: x / WBC x   Sq Epi: x / Non Sq Epi: x / Bacteria: x    CULTURES:  Culture Results:   Normal Respiratory Samantha present (01-14 @ 17:15)  Rapid RVP Result: NotDetec (01-14 @ 13:20)  Culture Results:   >100,000 CFU/ml Coag Negative Staphylococcus "Susceptibilities not  performed" (01-14 @ 12:37)  Culture Results:   No growth at 72 Hours (01-14 @ 10:20)  Culture Results:   No growth at 72 Hours (01-14 @ 10:20)    Physical Examination:    General: Intubated.    HEENT: Pupils equal, reactive to light. Symmetric. No scleral icterus or injection.    PULM: Diminished on left side.    NECK: Supple, no lymphadenopathy, trachea midline.    CVS: Regular rate and rhythm, no murmurs appreciated, +s1/s2.    ABD: Soft, nondistended, nontender, normoactive bowel sounds.    EXT: No edema, nontender.    SKIN: Warm and well perfused, no rashes noted.    NEURO: No mental status.    CRITICAL CARE TIME SPENT: 39 minutes Patient is a 68y old  Male who presents with a chief complaint of septic shock (17 Jan 2024 12:55)    BRIEF HOSPITAL COURSE: 69 y/o M with PMHx of seizure disorder, hydrocephalus s/p  shunt, TBI, and wheelchair bound who presented to ER on 1/14 with unresponsiveness. Over the week prior to presentation, pt with increased lethargy and decreased PO intake. Pt was admitted to ICU with acute hypoxic respiratory failure and septic shock 2/2 PNA. Intubated in ICU later that day and subsequent bronchoscopy.    Events last 24 hours: Received 1u PRBC. Hgb responded appropriately.     PAST MEDICAL & SURGICAL HISTORY:  Seizure  Dementia    Review of Systems:  Unable to obtain. Intubated/poor mental status.    Medications:  cefTRIAXone   IVPB      cefTRIAXone   IVPB 1000 milliGRAM(s) IV Intermittent every 24 hours  buMETAnide Infusion 4 mG/Hr IV Continuous <Continuous>  norepinephrine Infusion 0.05 MICROgram(s)/kG/Min IV Continuous <Continuous>  albuterol/ipratropium for Nebulization 3 milliLiter(s) Nebulizer every 6 hours  sodium chloride 3%  Inhalation 4 milliLiter(s) Inhalation every 6 hours  acetaminophen   Oral Liquid .. 650 milliGRAM(s) Oral every 6 hours PRN  valproic  acid Syrup 1250 milliGRAM(s) Oral two times a day  aspirin  chewable 81 milliGRAM(s) Enteral Tube daily  heparin   Injectable 5000 Unit(s) SubCutaneous every 8 hours  polyethylene glycol 3350 17 Gram(s) Oral daily  vasopressin Infusion 0.04 Unit(s)/Min IV Continuous <Continuous>  potassium chloride   Powder 40 milliEquivalent(s) Oral every 4 hours  sodium chloride 0.9% lock flush 10 milliLiter(s) IV Push every 1 hour PRN  chlorhexidine 0.12% Liquid 15 milliLiter(s) Oral Mucosa every 12 hours  chlorhexidine 2% Cloths 1 Application(s) Topical <User Schedule>    Mode: AC/ CMV (Assist Control/ Continuous Mandatory Ventilation)  RR (machine): 10  TV (machine): 450  FiO2: 40  PEEP: 8  ITime: 0.9  MAP: 10  PIP: 22    ICU Vital Signs Last 24 Hrs  T(C): 38.4 (17 Jan 2024 23:00), Max: 38.4 (17 Jan 2024 23:00)  T(F): 101.1 (17 Jan 2024 23:00), Max: 101.1 (17 Jan 2024 23:00)  HR: 100 (18 Jan 2024 00:57) (74 - 102)  BP: --  BP(mean): --  ABP: 108/59 (17 Jan 2024 23:00) (90/49 - 157/82)  ABP(mean): 75 (17 Jan 2024 23:00) (61 - 111)  RR: 18 (17 Jan 2024 23:00) (13 - 21)  SpO2: 99% (18 Jan 2024 00:57) (92% - 100%)    O2 Parameters below as of 18 Jan 2024 00:57  Patient On (Oxygen Delivery Method): ventilator    ABG - ( 16 Jan 2024 13:15 )  pH, Arterial: 7.48  pH, Blood: x     /  pCO2: 45    /  pO2: 85    / HCO3: 34    / Base Excess: 9.1   /  SaO2: 96.9      I&O's Detail    16 Jan 2024 07:01  -  17 Jan 2024 07:00  --------------------------------------------------------  IN:    Bumetanide: 200 mL    IV PiggyBack: 600 mL    Jevity 1.5: 400 mL    Nepro with Carb Steady: 400 mL    Norepinephrine: 256.6 mL    PRBCs (Packed Red Blood Cells): 322 mL    Vasopressin: 144 mL  Total IN: 2322.6 mL    OUT:    Indwelling Catheter - Urethral (mL): 2735 mL  Total OUT: 2735 mL    Total NET: -412.4 mL    17 Jan 2024 07:01  -  18 Jan 2024 00:58  --------------------------------------------------------  IN:    Bumetanide: 20 mL    Bumetanide: 260 mL    Enteral Tube Flush: 50 mL    IV PiggyBack: 250 mL    Nepro with Carb Steady: 470 mL    Norepinephrine: 84.4 mL    Vasopressin: 96 mL  Total IN: 1230.4 mL    OUT:    Indwelling Catheter - Urethral (mL): 3025 mL  Total OUT: 3025 mL    Total NET: -1794.6 mL    LABS:                        7.9    23.86 )-----------( 75       ( 17 Jan 2024 12:35 )             24.2     01-17    142  |  99  |  103<H>  ----------------------------<  161<H>  2.7<LL>   |  33<H>  |  2.22<H>    Ca    8.3<L>      17 Jan 2024 20:10  Phos  6.9     01-17  Mg     3.2     01-17    TPro  5.6<L>  /  Alb  1.1<L>  /  TBili  0.3  /  DBili  x   /  AST  46<H>  /  ALT  8<L>  /  AlkPhos  137<H>  01-17    CAPILLARY BLOOD GLUCOSE    POCT Blood Glucose.: 146 mg/dL (16 Jan 2024 11:27)    Urinalysis Basic - ( 17 Jan 2024 20:10 )  Color: x / Appearance: x / SG: x / pH: x  Gluc: 161 mg/dL / Ketone: x  / Bili: x / Urobili: x   Blood: x / Protein: x / Nitrite: x   Leuk Esterase: x / RBC: x / WBC x   Sq Epi: x / Non Sq Epi: x / Bacteria: x    CULTURES:  Culture Results:   Normal Respiratory Samantha present (01-14 @ 17:15)  Rapid RVP Result: NotDetec (01-14 @ 13:20)  Culture Results:   >100,000 CFU/ml Coag Negative Staphylococcus "Susceptibilities not  performed" (01-14 @ 12:37)  Culture Results:   No growth at 72 Hours (01-14 @ 10:20)  Culture Results:   No growth at 72 Hours (01-14 @ 10:20)    Physical Examination:    General: Intubated.    HEENT: Pupils equal, reactive to light. Symmetric. No scleral icterus or injection.    PULM: Diminished on left side.    NECK: Supple, no lymphadenopathy, trachea midline.    CVS: Regular rate and rhythm, no murmurs appreciated, +s1/s2.    ABD: Soft, nondistended, nontender, normoactive bowel sounds.    EXT: No edema, nontender.    SKIN: Warm and well perfused, no rashes noted.    NEURO: No mental status.    CRITICAL CARE TIME SPENT: 39 minutes Patient is a 68y old  Male who presents with a chief complaint of septic shock (17 Jan 2024 12:55)    BRIEF HOSPITAL COURSE: 69 y/o M with PMHx of seizure disorder, hydrocephalus s/p  shunt, TBI, and wheelchair bound who presented to ER on 1/14 with unresponsiveness. Over the week prior to presentation, pt with increased lethargy and decreased PO intake. Pt was admitted to ICU with acute hypoxic respiratory failure and septic shock 2/2 PNA. Intubated in ICU later that day and subsequent bronchoscopy.    Events last 24 hours: Received 1u PRBC. Hgb responded appropriately. Bumex infusion was increased to 4mg/hr. Urine output has been brisk. Remains hypokalemic (K 2.7). Bumex infusion held while potassium is being replaced.     PAST MEDICAL & SURGICAL HISTORY:  Seizure  Dementia    Review of Systems:  Unable to obtain. Intubated/poor mental status.    Medications:  cefTRIAXone   IVPB      cefTRIAXone   IVPB 1000 milliGRAM(s) IV Intermittent every 24 hours  buMETAnide Infusion 4 mG/Hr IV Continuous <Continuous>  norepinephrine Infusion 0.05 MICROgram(s)/kG/Min IV Continuous <Continuous>  albuterol/ipratropium for Nebulization 3 milliLiter(s) Nebulizer every 6 hours  sodium chloride 3%  Inhalation 4 milliLiter(s) Inhalation every 6 hours  acetaminophen   Oral Liquid .. 650 milliGRAM(s) Oral every 6 hours PRN  valproic  acid Syrup 1250 milliGRAM(s) Oral two times a day  aspirin  chewable 81 milliGRAM(s) Enteral Tube daily  heparin   Injectable 5000 Unit(s) SubCutaneous every 8 hours  polyethylene glycol 3350 17 Gram(s) Oral daily  vasopressin Infusion 0.04 Unit(s)/Min IV Continuous <Continuous>  potassium chloride   Powder 40 milliEquivalent(s) Oral every 4 hours  sodium chloride 0.9% lock flush 10 milliLiter(s) IV Push every 1 hour PRN  chlorhexidine 0.12% Liquid 15 milliLiter(s) Oral Mucosa every 12 hours  chlorhexidine 2% Cloths 1 Application(s) Topical <User Schedule>    Mode: AC/ CMV (Assist Control/ Continuous Mandatory Ventilation)  RR (machine): 10  TV (machine): 450  FiO2: 40  PEEP: 8  ITime: 0.9  MAP: 10  PIP: 22    ICU Vital Signs Last 24 Hrs  T(C): 38.4 (17 Jan 2024 23:00), Max: 38.4 (17 Jan 2024 23:00)  T(F): 101.1 (17 Jan 2024 23:00), Max: 101.1 (17 Jan 2024 23:00)  HR: 100 (18 Jan 2024 00:57) (74 - 102)  BP: --  BP(mean): --  ABP: 108/59 (17 Jan 2024 23:00) (90/49 - 157/82)  ABP(mean): 75 (17 Jan 2024 23:00) (61 - 111)  RR: 18 (17 Jan 2024 23:00) (13 - 21)  SpO2: 99% (18 Jan 2024 00:57) (92% - 100%)    O2 Parameters below as of 18 Jan 2024 00:57  Patient On (Oxygen Delivery Method): ventilator    ABG - ( 16 Jan 2024 13:15 )  pH, Arterial: 7.48  pH, Blood: x     /  pCO2: 45    /  pO2: 85    / HCO3: 34    / Base Excess: 9.1   /  SaO2: 96.9      I&O's Detail    16 Jan 2024 07:01  -  17 Jan 2024 07:00  --------------------------------------------------------  IN:    Bumetanide: 200 mL    IV PiggyBack: 600 mL    Jevity 1.5: 400 mL    Nepro with Carb Steady: 400 mL    Norepinephrine: 256.6 mL    PRBCs (Packed Red Blood Cells): 322 mL    Vasopressin: 144 mL  Total IN: 2322.6 mL    OUT:    Indwelling Catheter - Urethral (mL): 2735 mL  Total OUT: 2735 mL    Total NET: -412.4 mL    17 Jan 2024 07:01  -  18 Jan 2024 00:58  --------------------------------------------------------  IN:    Bumetanide: 20 mL    Bumetanide: 260 mL    Enteral Tube Flush: 50 mL    IV PiggyBack: 250 mL    Nepro with Carb Steady: 470 mL    Norepinephrine: 84.4 mL    Vasopressin: 96 mL  Total IN: 1230.4 mL    OUT:    Indwelling Catheter - Urethral (mL): 3025 mL  Total OUT: 3025 mL    Total NET: -1794.6 mL    LABS:                        7.9    23.86 )-----------( 75       ( 17 Jan 2024 12:35 )             24.2     01-17    142  |  99  |  103<H>  ----------------------------<  161<H>  2.7<LL>   |  33<H>  |  2.22<H>    Ca    8.3<L>      17 Jan 2024 20:10  Phos  6.9     01-17  Mg     3.2     01-17    TPro  5.6<L>  /  Alb  1.1<L>  /  TBili  0.3  /  DBili  x   /  AST  46<H>  /  ALT  8<L>  /  AlkPhos  137<H>  01-17    CAPILLARY BLOOD GLUCOSE    POCT Blood Glucose.: 146 mg/dL (16 Jan 2024 11:27)    Urinalysis Basic - ( 17 Jan 2024 20:10 )  Color: x / Appearance: x / SG: x / pH: x  Gluc: 161 mg/dL / Ketone: x  / Bili: x / Urobili: x   Blood: x / Protein: x / Nitrite: x   Leuk Esterase: x / RBC: x / WBC x   Sq Epi: x / Non Sq Epi: x / Bacteria: x    CULTURES:  Culture Results:   Normal Respiratory Samantha present (01-14 @ 17:15)  Rapid RVP Result: NotDetec (01-14 @ 13:20)  Culture Results:   >100,000 CFU/ml Coag Negative Staphylococcus "Susceptibilities not  performed" (01-14 @ 12:37)  Culture Results:   No growth at 72 Hours (01-14 @ 10:20)  Culture Results:   No growth at 72 Hours (01-14 @ 10:20)    Physical Examination:    General: Intubated.    HEENT: Pupils equal, reactive to light. Symmetric. No scleral icterus or injection.    PULM: Diminished on left side.    NECK: Supple, no lymphadenopathy, trachea midline.    CVS: Regular rate and rhythm, no murmurs appreciated, +s1/s2.    ABD: Soft, nondistended, nontender, normoactive bowel sounds.    EXT: No edema, nontender.    SKIN: Warm and well perfused, no rashes noted.    NEURO: No mental status.    CRITICAL CARE TIME SPENT: 39 minutes Patient is a 68y old  Male who presents with a chief complaint of septic shock (17 Jan 2024 12:55)    BRIEF HOSPITAL COURSE: 69 y/o M with PMHx of seizure disorder, hydrocephalus s/p  shunt, TBI, and wheelchair bound who presented to ER on 1/14 with unresponsiveness. Over the week prior to presentation, pt with increased lethargy and decreased PO intake. Pt was admitted to ICU with acute hypoxic respiratory failure and septic shock 2/2 PNA. Intubated in ICU later that day and subsequent bronchoscopy.    Events last 24 hours: Received 1u PRBC. Hgb responded appropriately. Bumex infusion was increased to 4mg/hr. Urine output has been brisk. Remains hypokalemic (K 2.7). Bumex infusion held while potassium is being replaced.     PAST MEDICAL & SURGICAL HISTORY:  Seizure  Dementia    Review of Systems:  Unable to obtain. Intubated/poor mental status.    Medications:  cefTRIAXone   IVPB      cefTRIAXone   IVPB 1000 milliGRAM(s) IV Intermittent every 24 hours  buMETAnide Infusion 4 mG/Hr IV Continuous <Continuous>  norepinephrine Infusion 0.05 MICROgram(s)/kG/Min IV Continuous <Continuous>  albuterol/ipratropium for Nebulization 3 milliLiter(s) Nebulizer every 6 hours  sodium chloride 3%  Inhalation 4 milliLiter(s) Inhalation every 6 hours  acetaminophen   Oral Liquid .. 650 milliGRAM(s) Oral every 6 hours PRN  valproic  acid Syrup 1250 milliGRAM(s) Oral two times a day  aspirin  chewable 81 milliGRAM(s) Enteral Tube daily  heparin   Injectable 5000 Unit(s) SubCutaneous every 8 hours  polyethylene glycol 3350 17 Gram(s) Oral daily  vasopressin Infusion 0.04 Unit(s)/Min IV Continuous <Continuous>  potassium chloride   Powder 40 milliEquivalent(s) Oral every 4 hours  sodium chloride 0.9% lock flush 10 milliLiter(s) IV Push every 1 hour PRN  chlorhexidine 0.12% Liquid 15 milliLiter(s) Oral Mucosa every 12 hours  chlorhexidine 2% Cloths 1 Application(s) Topical <User Schedule>    Mode: AC/ CMV (Assist Control/ Continuous Mandatory Ventilation)  RR (machine): 10  TV (machine): 450  FiO2: 40  PEEP: 8  ITime: 0.9  MAP: 10  PIP: 22    ICU Vital Signs Last 24 Hrs  T(C): 38.4 (17 Jan 2024 23:00), Max: 38.4 (17 Jan 2024 23:00)  T(F): 101.1 (17 Jan 2024 23:00), Max: 101.1 (17 Jan 2024 23:00)  HR: 100 (18 Jan 2024 00:57) (74 - 102)  BP: --  BP(mean): --  ABP: 108/59 (17 Jan 2024 23:00) (90/49 - 157/82)  ABP(mean): 75 (17 Jan 2024 23:00) (61 - 111)  RR: 18 (17 Jan 2024 23:00) (13 - 21)  SpO2: 99% (18 Jan 2024 00:57) (92% - 100%)    O2 Parameters below as of 18 Jan 2024 00:57  Patient On (Oxygen Delivery Method): ventilator    ABG - ( 16 Jan 2024 13:15 )  pH, Arterial: 7.48  pH, Blood: x     /  pCO2: 45    /  pO2: 85    / HCO3: 34    / Base Excess: 9.1   /  SaO2: 96.9      I&O's Detail    16 Jan 2024 07:01  -  17 Jan 2024 07:00  --------------------------------------------------------  IN:    Bumetanide: 200 mL    IV PiggyBack: 600 mL    Jevity 1.5: 400 mL    Nepro with Carb Steady: 400 mL    Norepinephrine: 256.6 mL    PRBCs (Packed Red Blood Cells): 322 mL    Vasopressin: 144 mL  Total IN: 2322.6 mL    OUT:    Indwelling Catheter - Urethral (mL): 2735 mL  Total OUT: 2735 mL    Total NET: -412.4 mL    17 Jan 2024 07:01  -  18 Jan 2024 00:58  --------------------------------------------------------  IN:    Bumetanide: 20 mL    Bumetanide: 260 mL    Enteral Tube Flush: 50 mL    IV PiggyBack: 250 mL    Nepro with Carb Steady: 470 mL    Norepinephrine: 84.4 mL    Vasopressin: 96 mL  Total IN: 1230.4 mL    OUT:    Indwelling Catheter - Urethral (mL): 3025 mL  Total OUT: 3025 mL    Total NET: -1794.6 mL    LABS:                        7.9    23.86 )-----------( 75       ( 17 Jan 2024 12:35 )             24.2     01-17    142  |  99  |  103<H>  ----------------------------<  161<H>  2.7<LL>   |  33<H>  |  2.22<H>    Ca    8.3<L>      17 Jan 2024 20:10  Phos  6.9     01-17  Mg     3.2     01-17    TPro  5.6<L>  /  Alb  1.1<L>  /  TBili  0.3  /  DBili  x   /  AST  46<H>  /  ALT  8<L>  /  AlkPhos  137<H>  01-17    CAPILLARY BLOOD GLUCOSE    POCT Blood Glucose.: 146 mg/dL (16 Jan 2024 11:27)    Urinalysis Basic - ( 17 Jan 2024 20:10 )  Color: x / Appearance: x / SG: x / pH: x  Gluc: 161 mg/dL / Ketone: x  / Bili: x / Urobili: x   Blood: x / Protein: x / Nitrite: x   Leuk Esterase: x / RBC: x / WBC x   Sq Epi: x / Non Sq Epi: x / Bacteria: x    CULTURES:  Culture Results:   Normal Respiratory Samantha present (01-14 @ 17:15)  Rapid RVP Result: NotDetec (01-14 @ 13:20)  Culture Results:   >100,000 CFU/ml Coag Negative Staphylococcus "Susceptibilities not  performed" (01-14 @ 12:37)  Culture Results:   No growth at 72 Hours (01-14 @ 10:20)  Culture Results:   No growth at 72 Hours (01-14 @ 10:20)    Physical Examination:    General: Intubated.    HEENT: Pupils equal, reactive to light. Symmetric. No scleral icterus or injection.    PULM: Diminished on left side.    NECK: Supple, no lymphadenopathy, trachea midline.    CVS: Regular rate and rhythm, no murmurs appreciated, +s1/s2.    ABD: Soft, nondistended, nontender, normoactive bowel sounds.    EXT: No edema, nontender.    SKIN: Warm and well perfused, no rashes noted.    NEURO: No mental status.    CRITICAL CARE TIME SPENT: 39 minutes    Date of entry of this note is equal to the date of services rendered.

## 2024-01-18 NOTE — CONSULT NOTE ADULT - ASSESSMENT
68M w/dementia, seizure disorder, hydrocephalus s/p  shunt, prior TBI admitted for AMS and acute hypoxemic respiratory failure 2/2 PNA and pulm edema. Now with persistent hypotension due to septic shock. Neuroloy consulted for AED mgmt.   Poor mental status on exam, remains intubated but off sedation  VPA level 93 -> 87 -> free level 57.9. ALthough levels checked at random intervals, albumin has remained 1.1 which leads to severe supratherapeutic VPA level  Home meds also list Vimpat - unclear what regimen is?  VPA recently lowered by outpatient neurologist due to concerns for lethargy  Ammonia 35  CT with chronic ischemic changes most prominent in bifrontal, prior ventriculostomy     AMS - likely multifactorial due to sepsis, VPA toxicity      Plan:  - stop depakote   - trend ammonia  - would verify if pt supposed to be on Vimpat, consider starting if supposed to be on it  - supportive care per ICU  - if mental status remains unchanged consider repeating imaging and obtaining eeg      Leandra Aceves,   Vascular Neurology  Office [phone]  Available via [employer] Teams

## 2024-01-18 NOTE — PROGRESS NOTE ADULT - ASSESSMENT
67 y/o M with PMHx of seizure disorder, hydrocephalus s/p  shunt, TBI, and wheelchair bound admitted with:    Acute hypoxic respiratory failure  Septic shock  Lobar PNA  SOFIA  Anemia  Hypokalemia    PLAN:  - Actively titrating levophed to maintain MAP > 65.  - Fixed dose vasopressin.  - Not requiring any sedation, no mental status.  - Actively titrating ventilator settings to maintain SpO2 > 92% and adequate minute ventilation.  - Tube feeds as tolerated.  - GI prophylaxis.  - Monitor renal function.  - Bumex infusion was increased to 4mg/hr. Holding until potassium is within normal limits.  - Abx coverage with rocephin.   - Strep pneumo urine antigen positive.  - Transfused 1u PRBC on 1/17.  - Monitor hgb.  - Continue chemical DVT prophylaxis at this time.

## 2024-01-18 NOTE — PROGRESS NOTE ADULT - SUBJECTIVE AND OBJECTIVE BOX
HPI:  Pt is a 68-year-old male with a PMHX of seizure disorder, dementia, hydrocephalus status post  shunt, previous traumatic brain injury, and wheelchair-bound at baseline is interactive presents to ED today brought in by home health aid for increased lethargy and decreased PO intake for the past week. Today pt become altered and unresponsive. EMS called upon arrival pt was hypoglycemic, hypotensive, and hypoxemic. Per home health aid pt has been having worsening difficulty with eating over the last couple of months and takes him a long time to swallow his pills. Per patient's mother, patient recently had Depakote dose decreased due to being more lethargic. Per ED pt with new bed sores on sacrum. H&P obtained by pts home health aid at bedside, pt nonresponsive to questions at this time.   In ED, pt hypoglycemic s/p 2 amps, hypoxemic on RA placed on NRB, and hypotensive (85/52) s/p 2L IVF with minimal improvement subsequently started on levophed.  S/p one dose vanc/zosyn. Labs remarkable for Glucose 39, Cr 3.08, , lactate 4.0, BNP 2942. VB.39/61/38/37.   Chest x-ray: Extensive left lung infiltrate and effusion with slight infiltrate at right base medially. Right ventricular shunt.  CTH noncon: Ventriculostomy noted in situ without evidence for hydrocephalus.  CT chest: Large consolidative/atelectatic changes involving the left lung. Small left pleural effusion. Complete opacification of the left lower lobe bronchus. Secretions within the left mainstem bronchus.   CT abd/pelvis: exophytic lesion at the mid to lower pole of the right kidney and additional bilateral renal cysts    Subjective:   Pt seen and examined at bedside. Pt awake and alert, laying comfortably in bed with NRB in place. Pt not answering to questions at this time, opening eyes to voice.      (2024 12:59)      24 hr events: No acute events.    ## ROS:  [x ] unable to obtain      ## Vitals  ICU Vital Signs Last 24 Hrs  T(C): 37.2 (2024 11:00), Max: 38.5 (2024 00:00)  T(F): 99 (2024 11:00), Max: 101.3 (2024 00:00)  HR: 93 (2024 11:35) (69 - 102)  BP: --  BP(mean): --  ABP: 85/42 (2024 11:00) (85/42 - 161/68)  ABP(mean): 55 (2024 11:00) (55 - 111)  RR: 13 (2024 11:00) (11 - 21)  SpO2: 100% (2024 11:35) (94% - 100%)    O2 Parameters below as of 2024 07:12  Patient On (Oxygen Delivery Method): ventilator, ETT: , PEEP5,RR10, FIO2 40%    O2 Concentration (%): 40        ## Physical Exam:  Gen: Adult male lying in bed, NAD  HEENT: NC/AT sclerae anicteric. ET tube in place  Resp: Mechanical breath sounds b/l  CV: S1, S2  Abd: Soft, + BS  Ext: +edema  Neuro: Unarousable    ## Vent Data  Mode: AC/ CMV (Assist Control/ Continuous Mandatory Ventilation)  RR (machine): 10  TV (machine): 450  FiO2: 40  PEEP: 5  ITime: 1  MAP: 7  PIP: 17      ## Labs:  Chem:      142  |  100  |  103<H>  ----------------------------<  137<H>  3.7   |  34<H>  |  2.18<H>    Ca    8.2<L>      2024 02:15  Phos  6.0     -18  Mg     3.1     -18    TPro  5.9<L>  /  Alb  1.2<L>  /  TBili  0.3  /  DBili  x   /  AST  65<H>  /  ALT  14  /  AlkPhos  235<H>  01-18    LIVER FUNCTIONS - ( 2024 02:15 )  Alb: 1.2 g/dL / Pro: 5.9 gm/dL / ALK PHOS: 235 U/L / ALT: 14 U/L / AST: 65 U/L / GGT: x           CBC:                        8.2    24.69 )-----------( 77       ( 2024 02:15 )             25.4     Coags:      Urinalysis Basic - ( 2024 02:15 )    Color: x / Appearance: x / SG: x / pH: x  Gluc: 137 mg/dL / Ketone: x  / Bili: x / Urobili: x   Blood: x / Protein: x / Nitrite: x   Leuk Esterase: x / RBC: x / WBC x   Sq Epi: x / Non Sq Epi: x / Bacteria: x        ## Cardiac        ## Blood Gas  ABG - ( 2024 13:15 )  pH, Arterial: 7.48  pH, Blood: x     /  pCO2: 45    /  pO2: 85    / HCO3: 34    / Base Excess: 9.1   /  SaO2: 96.9                #I/Os  I&O's Detail    2024 07:01  -  2024 07:00  --------------------------------------------------------  IN:    Bumetanide: 20 mL    Bumetanide: 310 mL    Enteral Tube Flush: 140 mL    IV PiggyBack: 750 mL    Nepro with Carb Steady: 960 mL    Norepinephrine: 118 mL    Vasopressin: 144 mL  Total IN: 2442 mL    OUT:    Indwelling Catheter - Urethral (mL): 4325 mL  Total OUT: 4325 mL    Total NET: -1883 mL      2024 07:01  -  2024 12:24  --------------------------------------------------------  IN:    Bumetanide: 20 mL    Nepro with Carb Steady: 120 mL    Norepinephrine: 6.2 mL    Vasopressin: 12 mL  Total IN: 158.2 mL    OUT:    Indwelling Catheter - Urethral (mL): 400 mL  Total OUT: 400 mL    Total NET: -241.8 mL          ## Imaging:    ## Medications:  MEDICATIONS  (STANDING):  albuterol/ipratropium for Nebulization 3 milliLiter(s) Nebulizer every 6 hours  aspirin  chewable 81 milliGRAM(s) Enteral Tube daily  buMETAnide Infusion 4 mG/Hr (20 mL/Hr) IV Continuous <Continuous>  cefTRIAXone   IVPB      cefTRIAXone   IVPB 1000 milliGRAM(s) IV Intermittent every 24 hours  chlorhexidine 0.12% Liquid 15 milliLiter(s) Oral Mucosa every 12 hours  chlorhexidine 2% Cloths 1 Application(s) Topical <User Schedule>  heparin   Injectable 5000 Unit(s) SubCutaneous every 8 hours  latanoprost 0.005% Ophthalmic Solution 1 Drop(s) Both EYES at bedtime  norepinephrine Infusion 0.05 MICROgram(s)/kG/Min (3.9 mL/Hr) IV Continuous <Continuous>  polyethylene glycol 3350 17 Gram(s) Oral daily  sodium chloride 3%  Inhalation 4 milliLiter(s) Inhalation every 6 hours  vasopressin Infusion 0.04 Unit(s)/Min (6 mL/Hr) IV Continuous <Continuous>    MEDICATIONS  (PRN):  acetaminophen   Oral Liquid .. 650 milliGRAM(s) Oral every 6 hours PRN Temp greater or equal to 38C (100.4F)  sodium chloride 0.9% lock flush 10 milliLiter(s) IV Push every 1 hour PRN Pre/post blood products, medications, blood draw, and to maintain line patency

## 2024-01-19 NOTE — PROGRESS NOTE ADULT - ASSESSMENT
69 y/o M with PMHx of seizure disorder, hydrocephalus s/p  shunt, TBI, and wheelchair bound admitted with:    Acute hypoxic respiratory failure  Septic shock  Lobar PNA  SOFIA  Anemia  Hypokalemia    PLAN:  - Actively titrating levophed to maintain MAP > 65.  - Fixed dose vasopressin.  - Not requiring any sedation, no mental status.  - Actively titrating ventilator settings to maintain SpO2 > 92% and adequate minute ventilation.  - Tube feeds as tolerated.  - GI prophylaxis.  - Monitor renal function.  - Bumex infusion was increased to 4mg/hr. Holding until potassium is within normal limits.  - Abx coverage with rocephin.   - Strep pneumo urine antigen positive.  - Transfused 1u PRBC on 1/17.  - Monitor hgb.  - Continue chemical DVT prophylaxis at this time.   67 y/o M with PMHx of seizure disorder, hydrocephalus s/p  shunt, TBI, and wheelchair bound admitted with:    Acute hypoxic respiratory failure  Septic shock  Lobar PNA  SOFIA  Anemia  Hypokalemia    PLAN:  - Actively titrating levophed to maintain MAP > 65.  - Vasopressin discontinued.  - Remains of sedation. Poor mentation.  - Neurology is concerned for depakote toxicity. Depakote was discontinued.  - Send ammonia level.  - If mentation remains poor, obtain repeat imaging and can also consider EEG.  - Actively titrating ventilator settings to maintain SpO2 > 92% and adequate minute ventilation.  - Tube feeds as tolerated.  - GI prophylaxis.  - Monitor renal function.  - Bumex infusion discontinued.  - Replace potassium.  - Abx coverage with rocephin.   - Strep pneumo urine antigen positive.  - Transfused 1u PRBC on 1/17.  - HIT negative.  - Continue chemical DVT prophylaxis at this time.

## 2024-01-19 NOTE — PROGRESS NOTE ADULT - SUBJECTIVE AND OBJECTIVE BOX
Neurology Progress Note    S: Patient seen and examined. No new events overnight. patient denied CP, SOB, HA or pain.     Medication:  acetaminophen   Oral Liquid .. 650 milliGRAM(s) Oral every 6 hours PRN  albuterol/ipratropium for Nebulization 3 milliLiter(s) Nebulizer every 6 hours  aspirin  chewable 81 milliGRAM(s) Enteral Tube daily  buMETAnide Injectable 1 milliGRAM(s) IV Push two times a day  chlorhexidine 0.12% Liquid 15 milliLiter(s) Oral Mucosa every 12 hours  chlorhexidine 2% Cloths 1 Application(s) Topical <User Schedule>  heparin   Injectable 5000 Unit(s) SubCutaneous every 8 hours  latanoprost 0.005% Ophthalmic Solution 1 Drop(s) Both EYES at bedtime  midodrine. 20 milliGRAM(s) Oral three times a day  polyethylene glycol 3350 17 Gram(s) Oral daily      Vitals:  Vital Signs Last 24 Hrs  T(C): 37.7 (19 Jan 2024 15:00), Max: 37.9 (19 Jan 2024 12:00)  T(F): 99.9 (19 Jan 2024 15:00), Max: 100.2 (19 Jan 2024 12:00)  HR: 106 (19 Jan 2024 15:00) (89 - 111)  BP: --  BP(mean): --  RR: 21 (19 Jan 2024 15:00) (10 - 21)  SpO2: 99% (19 Jan 2024 15:00) (96% - 100%)    Parameters below as of 19 Jan 2024 11:17  Patient On (Oxygen Delivery Method): ventilator, 40%        General Exam:   General Appearance:  intubated, not sedated  Head: Normocephalic, atraumatic and no dysmorphic features  Ear, Nose, and Throat: Moist mucous membranes  CVS: S1S2+  Resp: mechanical  GI: soft NT/ND  Extremities: No edema or cyanosis  Skin: No bruises or rashes     Neurological Exam:  Mental Status: eyes closed, does not respond to verbal or noxious stimuli. Does not follow commands.  Cranial Nerves: PERRL, no BTT, no facial palsy   Motor: trace spontaneous movement of RUE noted , inc tone in LE  Sensation: minimal withdrawl to noxious in RUE  Reflexes: 1+ throughout at biceps, brachioradialis, triceps, patellars and ankles bilaterally and equal. No clonus. R toe and L toe were both downgoing.  Coordination: unable  Gait: unable      I personally reviewed the below data/images/labs:      CBC Full  -  ( 19 Jan 2024 02:20 )  WBC Count : 15.97 K/uL  RBC Count : 2.60 M/uL  Hemoglobin : 8.1 g/dL  Hematocrit : 25.6 %  Platelet Count - Automated : 54 K/uL  Mean Cell Volume : 98.5 fl  Mean Cell Hemoglobin : 31.2 pg  Mean Cell Hemoglobin Concentration : 31.6 g/dL  Auto Neutrophil # : 12.14 K/uL  Auto Lymphocyte # : 1.76 K/uL  Auto Monocyte # : 1.60 K/uL  Auto Eosinophil # : 0.00 K/uL  Auto Basophil # : 0.00 K/uL  Auto Neutrophil % : 73.0 %  Auto Lymphocyte % : 11.0 %  Auto Monocyte % : 10.0 %  Auto Eosinophil % : 0.0 %  Auto Basophil % : 0.0 %    01-19    145  |  99  |  106<H>  ----------------------------<  128<H>  3.2<L>   |  37<H>  |  2.00<H>    Ca    8.6      19 Jan 2024 02:20  Phos  5.7     01-19  Mg     2.9     01-19    TPro  6.0  /  Alb  1.3<L>  /  TBili  0.3  /  DBili  x   /  AST  47<H>  /  ALT  13  /  AlkPhos  217<H>  01-19    LIVER FUNCTIONS - ( 19 Jan 2024 02:20 )  Alb: 1.3 g/dL / Pro: 6.0 gm/dL / ALK PHOS: 217 U/L / ALT: 13 U/L / AST: 47 U/L / GGT: x             Urinalysis Basic - ( 19 Jan 2024 02:20 )    Color: x / Appearance: x / SG: x / pH: x  Gluc: 128 mg/dL / Ketone: x  / Bili: x / Urobili: x   Blood: x / Protein: x / Nitrite: x   Leuk Esterase: x / RBC: x / WBC x   Sq Epi: x / Non Sq Epi: x / Bacteria: x    < from: CT Head No Cont (01.14.24 @ 11:25) >  IMPRESSION:    1)  chronic ischemic changes with atrophy, most severe in the frontal   lobes. Severe atrophic changes of the anterior corpus callosum and   periventricular white matter..  2)  chronic dural thickening and plaque calcification noted bilaterally,   with no acute hemorrhagic collections suggested.  3. Ventriculostomy noted in situ without evidence for hydrocephalus.    < end of copied text >     Neurology Progress Note    S: Patient seen and examined. WBC improving, ammonia increasing    Medication:  acetaminophen   Oral Liquid .. 650 milliGRAM(s) Oral every 6 hours PRN  albuterol/ipratropium for Nebulization 3 milliLiter(s) Nebulizer every 6 hours  aspirin  chewable 81 milliGRAM(s) Enteral Tube daily  buMETAnide Injectable 1 milliGRAM(s) IV Push two times a day  chlorhexidine 0.12% Liquid 15 milliLiter(s) Oral Mucosa every 12 hours  chlorhexidine 2% Cloths 1 Application(s) Topical <User Schedule>  heparin   Injectable 5000 Unit(s) SubCutaneous every 8 hours  latanoprost 0.005% Ophthalmic Solution 1 Drop(s) Both EYES at bedtime  midodrine. 20 milliGRAM(s) Oral three times a day  polyethylene glycol 3350 17 Gram(s) Oral daily      Vitals:  Vital Signs Last 24 Hrs  T(C): 37.7 (19 Jan 2024 15:00), Max: 37.9 (19 Jan 2024 12:00)  T(F): 99.9 (19 Jan 2024 15:00), Max: 100.2 (19 Jan 2024 12:00)  HR: 106 (19 Jan 2024 15:00) (89 - 111)  BP: --  BP(mean): --  RR: 21 (19 Jan 2024 15:00) (10 - 21)  SpO2: 99% (19 Jan 2024 15:00) (96% - 100%)    Parameters below as of 19 Jan 2024 11:17  Patient On (Oxygen Delivery Method): ventilator, 40%        General Exam:   General Appearance:  intubated, not sedated  Head: Normocephalic, atraumatic and no dysmorphic features  Ear, Nose, and Throat: Moist mucous membranes  CVS: S1S2+  Resp: mechanical  GI: soft NT/ND  Extremities: No edema or cyanosis  Skin: No bruises or rashes     Neurological Exam:  Mental Status: eyes closed, does not respond to verbal or noxious stimuli. Does not follow commands.  Cranial Nerves: PERRL, no BTT, no facial palsy   Motor: trace spontaneous movement of RUE noted , inc tone in LE  Sensation: minimal withdrawl to noxious in RUE  Reflexes: 1+ throughout at biceps, brachioradialis, triceps, patellars and ankles bilaterally and equal. No clonus. R toe and L toe were both downgoing.  Coordination: unable  Gait: unable      I personally reviewed the below data/images/labs:      CBC Full  -  ( 19 Jan 2024 02:20 )  WBC Count : 15.97 K/uL  RBC Count : 2.60 M/uL  Hemoglobin : 8.1 g/dL  Hematocrit : 25.6 %  Platelet Count - Automated : 54 K/uL  Mean Cell Volume : 98.5 fl  Mean Cell Hemoglobin : 31.2 pg  Mean Cell Hemoglobin Concentration : 31.6 g/dL  Auto Neutrophil # : 12.14 K/uL  Auto Lymphocyte # : 1.76 K/uL  Auto Monocyte # : 1.60 K/uL  Auto Eosinophil # : 0.00 K/uL  Auto Basophil # : 0.00 K/uL  Auto Neutrophil % : 73.0 %  Auto Lymphocyte % : 11.0 %  Auto Monocyte % : 10.0 %  Auto Eosinophil % : 0.0 %  Auto Basophil % : 0.0 %    01-19    145  |  99  |  106<H>  ----------------------------<  128<H>  3.2<L>   |  37<H>  |  2.00<H>    Ca    8.6      19 Jan 2024 02:20  Phos  5.7     01-19  Mg     2.9     01-19    TPro  6.0  /  Alb  1.3<L>  /  TBili  0.3  /  DBili  x   /  AST  47<H>  /  ALT  13  /  AlkPhos  217<H>  01-19    LIVER FUNCTIONS - ( 19 Jan 2024 02:20 )  Alb: 1.3 g/dL / Pro: 6.0 gm/dL / ALK PHOS: 217 U/L / ALT: 13 U/L / AST: 47 U/L / GGT: x             Urinalysis Basic - ( 19 Jan 2024 02:20 )    Color: x / Appearance: x / SG: x / pH: x  Gluc: 128 mg/dL / Ketone: x  / Bili: x / Urobili: x   Blood: x / Protein: x / Nitrite: x   Leuk Esterase: x / RBC: x / WBC x   Sq Epi: x / Non Sq Epi: x / Bacteria: x    < from: CT Head No Cont (01.14.24 @ 11:25) >  IMPRESSION:    1)  chronic ischemic changes with atrophy, most severe in the frontal   lobes. Severe atrophic changes of the anterior corpus callosum and   periventricular white matter..  2)  chronic dural thickening and plaque calcification noted bilaterally,   with no acute hemorrhagic collections suggested.  3. Ventriculostomy noted in situ without evidence for hydrocephalus.    < end of copied text >

## 2024-01-19 NOTE — CHART NOTE - NSCHARTNOTEFT_GEN_A_CORE
Discussed with patients mother at bedside his current medication regimen. Full medication reconciliation done and in chart.  Left message with patients neurologist pending a response. Verified that patient was taking Depakote 2000 mg total daily, which was recently titrated down from 2500 mg. Additionally patients mother informed that he was taking Vimpat 100 mg BID however they quickly stopped per Neurologist in December due to associated lethargy. Currently patient off all AEDs, slowly having improvement in mental status and now tracking. Will consider starting AED in the AM if continues to improve mental status after discussion with Neurology.

## 2024-01-19 NOTE — PROGRESS NOTE ADULT - ASSESSMENT
67 y/o M w/dementia, seizure disorder, hydrocephalus s/p  shunt, prior TBI admitted for AMS and acute hypoxemic respiratory failure. Respiratory failure likely secondary to PNA and acute pulmonary edema. Hypotension likely secondary to severe sepsis with septic shock. Presumed SOFIA ( unknown baseline) likely ATN. Possible depakote toxicity as etiology for AMS, level is very supratherapeutic. Today MS ? improving    Resp: SBT  CVS: Taper Midodrine as tolerated  HEME: DVT prophylaxis with sq Heparin  FEN: Cont enteral feeds/ Replace K; pt hypokalemic  Neuro: MS ? improving/ Neuro f/u noted  Social: Mother at the bedside and updated

## 2024-01-19 NOTE — PROGRESS NOTE ADULT - SUBJECTIVE AND OBJECTIVE BOX
Patient is a 68y old  Male who presents with a chief complaint of septic shock (18 Jan 2024 15:53)    BRIEF HOSPITAL COURSE: 67 y/o M with PMHx of seizure disorder, hydrocephalus s/p  shunt, TBI, and wheelchair bound who presented to ER on 1/14 with unresponsiveness. Over the week prior to presentation, pt with increased lethargy and decreased PO intake. Pt was admitted to ICU with acute hypoxic respiratory failure and septic shock 2/2 PNA. Intubated in ICU later that day and subsequent bronchoscopy was performed.    Events last 24 hours: Vasopressin discontinued. Remains on low dose levophed infusion for hemodynamic support. Bumex infusion discontinued.    PAST MEDICAL & SURGICAL HISTORY:  Seizure  Dementia    Review of Systems:  Unable to obtain. Intubated, poor mental status.    Medications:  buMETAnide Injectable 1 milliGRAM(s) IV Push two times a day  norepinephrine Infusion 0.05 MICROgram(s)/kG/Min IV Continuous <Continuous>  albuterol/ipratropium for Nebulization 3 milliLiter(s) Nebulizer every 6 hours  sodium chloride 3%  Inhalation 4 milliLiter(s) Inhalation every 6 hours  acetaminophen   Oral Liquid .. 650 milliGRAM(s) Oral every 6 hours PRN  aspirin  chewable 81 milliGRAM(s) Enteral Tube daily  heparin   Injectable 5000 Unit(s) SubCutaneous every 8 hours  polyethylene glycol 3350 17 Gram(s) Oral daily  sodium chloride 0.9% lock flush 10 milliLiter(s) IV Push every 1 hour PRN  chlorhexidine 0.12% Liquid 15 milliLiter(s) Oral Mucosa every 12 hours  chlorhexidine 2% Cloths 1 Application(s) Topical <User Schedule>  latanoprost 0.005% Ophthalmic Solution 1 Drop(s) Both EYES at bedtime    Mode: AC/ CMV (Assist Control/ Continuous Mandatory Ventilation)  RR (machine): 10  TV (machine): 450  FiO2: 40  PEEP: 5  ITime: 0.9  MAP: 7  PIP: 20    ICU Vital Signs Last 24 Hrs  T(C): 37.2 (19 Jan 2024 00:00), Max: 38.2 (18 Jan 2024 01:00)  T(F): 99 (19 Jan 2024 00:00), Max: 100.8 (18 Jan 2024 01:00)  HR: 90 (19 Jan 2024 00:00) (69 - 103)  BP: --  BP(mean): --  ABP: 114/63 (19 Jan 2024 00:00) (85/42 - 161/68)  ABP(mean): 83 (19 Jan 2024 00:00) (55 - 101)  RR: 14 (19 Jan 2024 00:00) (10 - 21)  SpO2: 100% (19 Jan 2024 00:00) (94% - 100%)    O2 Parameters below as of 18 Jan 2024 20:00  Patient On (Oxygen Delivery Method): ventilator,  RR 10 Peep 5     O2 Concentration (%): 40    I&O's Detail    17 Jan 2024 07:01  -  18 Jan 2024 07:00  --------------------------------------------------------  IN:    Bumetanide: 20 mL    Bumetanide: 310 mL    Enteral Tube Flush: 140 mL    IV PiggyBack: 750 mL    Nepro with Carb Steady: 960 mL    Norepinephrine: 118 mL    Vasopressin: 144 mL  Total IN: 2442 mL    OUT:    Indwelling Catheter - Urethral (mL): 4325 mL  Total OUT: 4325 mL    Total NET: -1883 mL    18 Jan 2024 07:01  -  19 Jan 2024 00:44  --------------------------------------------------------  IN:    Bumetanide: 20 mL    Bumetanide: 180 mL    Nepro with Carb Steady: 600 mL    Norepinephrine: 58.4 mL    Vasopressin: 12 mL  Total IN: 870.4 mL    OUT:    Indwelling Catheter - Urethral (mL): 2890 mL  Total OUT: 2890 mL    Total NET: -2019.6 mL    LABS:                        8.2    24.69 )-----------( 77       ( 18 Jan 2024 02:15 )             25.4     01-18    143  |  99  |  104<H>  ----------------------------<  141<H>  3.1<L>   |  38<H>  |  2.18<H>    Ca    8.7      18 Jan 2024 13:50  Phos  5.6     01-18  Mg     3.1     01-18    TPro  5.9<L>  /  Alb  1.2<L>  /  TBili  0.3  /  DBili  x   /  AST  65<H>  /  ALT  14  /  AlkPhos  235<H>  01-18    CAPILLARY BLOOD GLUCOSE    Urinalysis Basic - ( 18 Jan 2024 13:50 )  Color: x / Appearance: x / SG: x / pH: x  Gluc: 141 mg/dL / Ketone: x  / Bili: x / Urobili: x   Blood: x / Protein: x / Nitrite: x   Leuk Esterase: x / RBC: x / WBC x   Sq Epi: x / Non Sq Epi: x / Bacteria: x    CULTURES:  Culture Results:   Normal Respiratory Samantha present (01-14 @ 17:15)  Rapid RVP Result: NotDetec (01-14 @ 13:20)  Culture Results:   >100,000 CFU/ml Coag Negative Staphylococcus "Susceptibilities not  performed" (01-14 @ 12:37)  Culture Results:   No growth at 4 days (01-14 @ 10:20)  Culture Results:   No growth at 4 days (01-14 @ 10:20)    Physical Examination:    General: Well appearing, lying in bed in NAD.      HEENT: Pupils equal, reactive to light. Symmetric. No scleral icterus or injection.    PULM: Clear to auscultation B/L. No wheezes, rales, or rhonchi apprecaited. No significant sputum production or increased respiratory effort.    NECK: Supple, no lymphadenopathy, trachea midline.    CVS: Regular rate and rhythm, no murmurs appreciated, +s1/s2.    ABD: Soft, nondistended, nontender, normoactive bowel sounds.    EXT: No edema, nontender.    SKIN: Warm and well perfused, no rashes noted.    NEURO: Alert, oriented, interactive, nonfocal.    CRITICAL CARE TIME SPENT: 36 minutes    Date of entry of this note is equal to the date of services rendered.   Patient is a 68y old  Male who presents with a chief complaint of septic shock (18 Jan 2024 15:53)    BRIEF HOSPITAL COURSE: 67 y/o M with PMHx of seizure disorder, hydrocephalus s/p  shunt, TBI, and wheelchair bound who presented to ER on 1/14 with unresponsiveness. Over the week prior to presentation, pt with increased lethargy and decreased PO intake. Pt was admitted to ICU with acute hypoxic respiratory failure and septic shock 2/2 PNA. Intubated in ICU later that day and subsequent bronchoscopy was performed.    Events last 24 hours: Vasopressin discontinued. Remains on low dose levophed infusion for hemodynamic support. Bumex infusion discontinued.    PAST MEDICAL & SURGICAL HISTORY:  Seizure  Dementia    Review of Systems:  Unable to obtain. Intubated, poor mental status.    Medications:  buMETAnide Injectable 1 milliGRAM(s) IV Push two times a day  norepinephrine Infusion 0.05 MICROgram(s)/kG/Min IV Continuous <Continuous>  albuterol/ipratropium for Nebulization 3 milliLiter(s) Nebulizer every 6 hours  sodium chloride 3%  Inhalation 4 milliLiter(s) Inhalation every 6 hours  acetaminophen   Oral Liquid .. 650 milliGRAM(s) Oral every 6 hours PRN  aspirin  chewable 81 milliGRAM(s) Enteral Tube daily  heparin   Injectable 5000 Unit(s) SubCutaneous every 8 hours  polyethylene glycol 3350 17 Gram(s) Oral daily  sodium chloride 0.9% lock flush 10 milliLiter(s) IV Push every 1 hour PRN  chlorhexidine 0.12% Liquid 15 milliLiter(s) Oral Mucosa every 12 hours  chlorhexidine 2% Cloths 1 Application(s) Topical <User Schedule>  latanoprost 0.005% Ophthalmic Solution 1 Drop(s) Both EYES at bedtime    Mode: AC/ CMV (Assist Control/ Continuous Mandatory Ventilation)  RR (machine): 10  TV (machine): 450  FiO2: 40  PEEP: 5  ITime: 0.9  MAP: 7  PIP: 20    ICU Vital Signs Last 24 Hrs  T(C): 37.2 (19 Jan 2024 00:00), Max: 38.2 (18 Jan 2024 01:00)  T(F): 99 (19 Jan 2024 00:00), Max: 100.8 (18 Jan 2024 01:00)  HR: 90 (19 Jan 2024 00:00) (69 - 103)  BP: --  BP(mean): --  ABP: 114/63 (19 Jan 2024 00:00) (85/42 - 161/68)  ABP(mean): 83 (19 Jan 2024 00:00) (55 - 101)  RR: 14 (19 Jan 2024 00:00) (10 - 21)  SpO2: 100% (19 Jan 2024 00:00) (94% - 100%)    O2 Parameters below as of 18 Jan 2024 20:00  Patient On (Oxygen Delivery Method): ventilator,  RR 10 Peep 5     O2 Concentration (%): 40    I&O's Detail    17 Jan 2024 07:01  -  18 Jan 2024 07:00  --------------------------------------------------------  IN:    Bumetanide: 20 mL    Bumetanide: 310 mL    Enteral Tube Flush: 140 mL    IV PiggyBack: 750 mL    Nepro with Carb Steady: 960 mL    Norepinephrine: 118 mL    Vasopressin: 144 mL  Total IN: 2442 mL    OUT:    Indwelling Catheter - Urethral (mL): 4325 mL  Total OUT: 4325 mL    Total NET: -1883 mL    18 Jan 2024 07:01  -  19 Jan 2024 00:44  --------------------------------------------------------  IN:    Bumetanide: 20 mL    Bumetanide: 180 mL    Nepro with Carb Steady: 600 mL    Norepinephrine: 58.4 mL    Vasopressin: 12 mL  Total IN: 870.4 mL    OUT:    Indwelling Catheter - Urethral (mL): 2890 mL  Total OUT: 2890 mL    Total NET: -2019.6 mL    LABS:                        8.2    24.69 )-----------( 77       ( 18 Jan 2024 02:15 )             25.4     01-18    143  |  99  |  104<H>  ----------------------------<  141<H>  3.1<L>   |  38<H>  |  2.18<H>    Ca    8.7      18 Jan 2024 13:50  Phos  5.6     01-18  Mg     3.1     01-18    TPro  5.9<L>  /  Alb  1.2<L>  /  TBili  0.3  /  DBili  x   /  AST  65<H>  /  ALT  14  /  AlkPhos  235<H>  01-18    CAPILLARY BLOOD GLUCOSE    Urinalysis Basic - ( 18 Jan 2024 13:50 )  Color: x / Appearance: x / SG: x / pH: x  Gluc: 141 mg/dL / Ketone: x  / Bili: x / Urobili: x   Blood: x / Protein: x / Nitrite: x   Leuk Esterase: x / RBC: x / WBC x   Sq Epi: x / Non Sq Epi: x / Bacteria: x    CULTURES:  Culture Results:   Normal Respiratory Samantha present (01-14 @ 17:15)  Rapid RVP Result: NotDetec (01-14 @ 13:20)  Culture Results:   >100,000 CFU/ml Coag Negative Staphylococcus "Susceptibilities not  performed" (01-14 @ 12:37)  Culture Results:   No growth at 4 days (01-14 @ 10:20)  Culture Results:   No growth at 4 days (01-14 @ 10:20)    Physical Examination:    General: Intubated.    HEENT: Pupils equal, reactive to light. Symmetric. No scleral icterus or injection.    PULM: Diminished on left side.    NECK: Supple, no lymphadenopathy, trachea midline.    CVS: Regular rate and rhythm, no murmurs appreciated, +s1/s2.    ABD: Soft, nondistended, nontender, normoactive bowel sounds.    EXT: No edema, nontender.    SKIN: Warm and well perfused, no rashes noted.    NEURO: No mental status.    CRITICAL CARE TIME SPENT: 36 minutes    Date of entry of this note is equal to the date of services rendered.

## 2024-01-19 NOTE — PROGRESS NOTE ADULT - SUBJECTIVE AND OBJECTIVE BOX
HPI:  69 y/o M w/dementia, seizure disorder, hydrocephalus s/p  shunt, prior TBI admitted for AMS and acute hypoxemic respiratory failure. Respiratory failure likely secondary to PNA and acute pulmonary edema. Hypotension likely secondary to severe sepsis with septic shock. Presumed SOFIA ( unknown baseline) likely ATN. Possible depakote toxicity as etiology for AMS, level is very supratherapeutic. Today MS ? improving    ## Labs:  CBC:                        8.1    15.97 )-----------( 54       ( 2024 02:20 )             25.6     Chem:      145  |  99  |  106<H>  ----------------------------<  128<H>  3.2<L>   |  37<H>  |  2.00<H>    Ca    8.6      2024 02:20  Phos  5.7       Mg     2.9         TPro  6.0  /  Alb  1.3<L>  /  TBili  0.3  /  DBili  x   /  AST  47<H>  /  ALT  13  /  AlkPhos  217<H>      Coags:          ## Imaging:    ## Medications:    buMETAnide Injectable 1 milliGRAM(s) IV Push two times a day  midodrine. 20 milliGRAM(s) Oral three times a day    albuterol/ipratropium for Nebulization 3 milliLiter(s) Nebulizer every 6 hours      aspirin  chewable 81 milliGRAM(s) Enteral Tube daily  heparin   Injectable 5000 Unit(s) SubCutaneous every 8 hours    polyethylene glycol 3350 17 Gram(s) Oral daily    acetaminophen   Oral Liquid .. 650 milliGRAM(s) Oral every 6 hours PRN      ## Vitals:  T(C): 37.7 (24 @ 22:00), Max: 37.9 (24 @ 12:00)  HR: 102 (24 @ 22:00) (88 - 114)  BP: 118/67 (24 @ 22:00) (118/67 - 118/67)  BP(mean): 83 (24 @ 22:00) (83 - 83)  RR: 16 (24 @ 22:00) (10 - 26)  SpO2: 99% (24 @ 22:00) (92% - 107%)  Wt(kg): --  Vent: Mode: AC/ CMV (Assist Control/ Continuous Mandatory Ventilation), RR (machine): 10, RR (patient): 15, TV (machine): 450, FiO2: 50, PEEP: 5, PIP: 22  AB-18 @ 07:01  -   @ 07:00  --------------------------------------------------------  IN: 1585.2 mL / OUT: 3890 mL / NET: -2304.8 mL     @ 07: @ 23:31  --------------------------------------------------------  IN: 540 mL / OUT: 1680 mL / NET: -1140 mL          ## P/E:  Gen: lying comfortably in bed in no apparent distress  Mouth: (+) ETT/ OGT  Lungs: CTA  Heart: RRR  Abd: Soft/+BS/ Non-tender  Ext: Hand and LE dependent edema  Neuro: Tracking with eyes but not following commands    CENTRAL LINE: [ ] YES [ ] NO  LOCATION:   DATE INSERTED:  REMOVE: [ ] YES [ ] NO      CAROLYNN: [ ] YES [ ] NO    DATE INSERTED:  REMOVE:  [ ] YES [ ] NO      A-LINE:  [ ] YES [ ] NO  LOCATION:   DATE INSERTED:  REMOVE:  [ ] YES [ ] NO  EXPLAIN:    CODE STATUS: [x ] full code  [ ] DNR  [ ] DNI  [ ] [facility]  Goals of care discussion: [ ] yes

## 2024-01-19 NOTE — PROGRESS NOTE ADULT - ASSESSMENT
68M w/dementia, seizure disorder, hydrocephalus s/p  shunt, prior TBI admitted for AMS and acute hypoxemic respiratory failure 2/2 PNA and pulm edema. Now with persistent hypotension due to septic shock. Neuroloy consulted for AED mgmt.   Poor mental status on exam, remains intubated but off sedation  VPA level 93 -> 87 -> free level 57.9. ALthough levels checked at random intervals, albumin has remained 1.1 which leads to severe supratherapeutic VPA level  Home meds also list Vimpat - unclear what regimen is?  VPA recently lowered by outpatient neurologist due to concerns for lethargy  Ammonia 35  CT with chronic ischemic changes most prominent in bifrontal, prior ventriculostomy     AMS - likely multifactorial due to sepsis, VPA toxicity      Plan:  - stop depakote   - trend ammonia  - would verify if pt supposed to be on Vimpat, consider starting if supposed to be on it  - supportive care per ICU  - if mental status remains unchanged consider repeating imaging and obtaining eeg      Leandra Aceves,   Vascular Neurology  Office [phone]  Available via [employer] Teams      68M w/dementia, seizure disorder, hydrocephalus s/p  shunt, prior TBI admitted for AMS and acute hypoxemic respiratory failure 2/2 PNA and pulm edema. Now with persistent hypotension due to septic shock. Neuroloy consulted for AED mgmt.   Poor mental status on exam, remains intubated but off sedation  VPA level 93 -> 87 -> free level 57.9. ALthough levels checked at random intervals, albumin has remained 1.1 which leads to severe supratherapeutic VPA level  Home meds also list Vimpat - unclear what regimen is?  VPA recently lowered by outpatient neurologist due to concerns for lethargy  Ammonia 35 -> 40  Leukocytosis improving  CT with chronic ischemic changes most prominent in bifrontal, prior ventriculostomy     AMS - likely multifactorial due to sepsis, VPA toxicity  Hyperammonemia - ?related to VPA vs other etioloy       Plan:  - now off depakote  - trend ammonia  - would verify if pt supposed to be on Vimpat, consider starting if supposed to be on it especially now that depakote was stopped  - supportive care per ICU  - if mental status remains unchanged consider repeating imaging with MRI and obtaining eeg      Leandra Aceves,   Vascular Neurology  Office [phone]  Available via [employer] Teams

## 2024-01-20 NOTE — PROGRESS NOTE ADULT - SUBJECTIVE AND OBJECTIVE BOX
HPI:  Pt is a 68-year-old male with a PMHX of seizure disorder, dementia, hydrocephalus status post  shunt, previous traumatic brain injury, and wheelchair-bound at baseline is interactive presents to ED today brought in by home health aid for increased lethargy and decreased PO intake for the past week. Today pt become altered and unresponsive. EMS called upon arrival pt was hypoglycemic, hypotensive, and hypoxemic. Per home health aid pt has been having worsening difficulty with eating over the last couple of months and takes him a long time to swallow his pills. Per patient's mother, patient recently had Depakote dose decreased due to being more lethargic. Per ED pt with new bed sores on sacrum. H&P obtained by pts home health aid at bedside, pt nonresponsive to questions at this time.   In ED, pt hypoglycemic s/p 2 amps, hypoxemic on RA placed on NRB, and hypotensive (85/52) s/p 2L IVF with minimal improvement subsequently started on levophed.  S/p one dose vanc/zosyn. Labs remarkable for Glucose 39, Cr 3.08, , lactate 4.0, BNP 2942. VB.39/61/38/37.   Chest x-ray: Extensive left lung infiltrate and effusion with slight infiltrate at right base medially. Right ventricular shunt.  CTH noncon: Ventriculostomy noted in situ without evidence for hydrocephalus.  CT chest: Large consolidative/atelectatic changes involving the left lung. Small left pleural effusion. Complete opacification of the left lower lobe bronchus. Secretions within the left mainstem bronchus.   CT abd/pelvis: exophytic lesion at the mid to lower pole of the right kidney and additional bilateral renal cysts    Subjective:   Pt seen and examined at bedside. Pt awake and alert, laying comfortably in bed with NRB in place. Pt not answering to questions at this time, opening eyes to voice.      (2024 12:59)      24 hr events:      ## Labs:  CBC:                        7.9    13.00 )-----------( 59       ( 2024 02:07 )             26.2     Chem:  -    143  |  100  |  120<H>  ----------------------------<  165<H>  3.7   |  36<H>  |  2.16<H>    Ca    8.7      2024 02:07  Phos  4.7       Mg     2.9         TPro  6.0  /  Alb  1.3<L>  /  TBili  0.3  /  DBili  x   /  AST  60<H>  /  ALT  14  /  AlkPhos  280<H>      Coags:          ## Imaging:    ## Medications:    midodrine. 20 milliGRAM(s) Oral three times a day    albuterol/ipratropium for Nebulization 3 milliLiter(s) Nebulizer every 6 hours  sodium chloride 3%  Inhalation 4 milliLiter(s) Inhalation every 6 hours      aspirin  chewable 81 milliGRAM(s) Enteral Tube daily  heparin   Injectable 5000 Unit(s) SubCutaneous every 8 hours    polyethylene glycol 3350 17 Gram(s) Oral daily    acetaminophen   Oral Liquid .. 650 milliGRAM(s) Oral every 6 hours PRN      ## Vitals:  T(C): 37.5 (24 @ 20:30), Max: 38 (24 @ 00:00)  HR: 105 (24 @ 20:30) (92 - 145)  BP: 114/80 (24 @ 08:00) (109/70 - 118/67)  BP(mean): 89 (24 @ 08:00) (83 - 89)  RR: 16 (24 @ 20:30) (10 - 100)  SpO2: 100% (24 @ 17:45) (83% - 100%)  Wt(kg): --  Vent: Mode: AC/ CMV (Assist Control/ Continuous Mandatory Ventilation), RR (machine): 10, RR (patient): 15, TV (machine): 450, FiO2: 50, PEEP: 5, PIP: 20  AB-19 @ 07:  -   @ 07:00  --------------------------------------------------------  IN: 960 mL / OUT: 2405 mL / NET: -1445 mL    01-20 @ 07: @ 21:18  --------------------------------------------------------  IN: 120 mL / OUT: 590 mL / NET: -470 mL          ## P/E:  Gen: lying comfortably in bed in no apparent distress  Mouth: (+) ETT/ OGT  Lungs: CTA  Heart: Tachy  Abd: Soft/+BS/ Non-tender  Ext: Hand and LE dependent edema  Neuro: Less responsive    CENTRAL LINE: [ ] YES [ ] NO  LOCATION:   DATE INSERTED:  REMOVE: [ ] YES [ ] NO      CAROLYNN: [ ] YES [ ] NO    DATE INSERTED:  REMOVE:  [ ] YES [ ] NO      A-LINE:  [ ] YES [ ] NO  LOCATION:   DATE INSERTED:  REMOVE:  [ ] YES [ ] NO  EXPLAIN:    CODE STATUS: [x ] full code  [ ] DNR  [ ] DNI  [ ] [facility]  Goals of care discussion: [ ] yes      HPI:  Pt is a 69 yo BM with h/o TBI, hydrocephalus s/p  shunt, Sz d/o and dementia admitted for AMS and acute hypoxemic respiratory failure. Respiratory failure likely 2 to L lung atelectasis and PNA.  MS change 2 to Depakote toxicity and ? uremia. Overnight pt desat'd and CXR revealed R UL atelectasis       ## Labs:  CBC:                        7.9    13.00 )-----------( 59       ( 2024 02:07 )             26.2     Chem:      143  |  100  |  120<H>  ----------------------------<  165<H>  3.7   |  36<H>  |  2.16<H>    Ca    8.7      2024 02:07  Phos  4.7       Mg     2.9         TPro  6.0  /  Alb  1.3<L>  /  TBili  0.3  /  DBili  x   /  AST  60<H>  /  ALT  14  /  AlkPhos  280<H>      Coags:          ## Imaging:    ## Medications:    midodrine. 20 milliGRAM(s) Oral three times a day    albuterol/ipratropium for Nebulization 3 milliLiter(s) Nebulizer every 6 hours  sodium chloride 3%  Inhalation 4 milliLiter(s) Inhalation every 6 hours      aspirin  chewable 81 milliGRAM(s) Enteral Tube daily  heparin   Injectable 5000 Unit(s) SubCutaneous every 8 hours    polyethylene glycol 3350 17 Gram(s) Oral daily    acetaminophen   Oral Liquid .. 650 milliGRAM(s) Oral every 6 hours PRN      ## Vitals:  T(C): 37.5 (24 @ 20:30), Max: 38 (24 @ 00:00)  HR: 105 (24 @ 20:30) (92 - 145)  BP: 114/80 (24 @ 08:00) (109/70 - 118/67)  BP(mean): 89 (24 @ 08:00) (83 - 89)  RR: 16 (24 @ 20:30) (10 - 100)  SpO2: 100% (24 @ 17:45) (83% - 100%)  Wt(kg): --  Vent: Mode: AC/ CMV (Assist Control/ Continuous Mandatory Ventilation), RR (machine): 10, RR (patient): 15, TV (machine): 450, FiO2: 50, PEEP: 5, PIP: 20  AB-19 @ 07:01  -   @ 07:00  --------------------------------------------------------  IN: 960 mL / OUT: 2405 mL / NET: -1445 mL     @ : @ 21:18  --------------------------------------------------------  IN: 120 mL / OUT: 590 mL / NET: -470 mL          ## P/E:  Gen: lying comfortably in bed in no apparent distress  Mouth: (+) ETT/ OGT  Lungs: CTA  Heart: Tachy  Abd: Soft/+BS/ Non-tender  Ext: Hand and LE dependent edema  Neuro: Less responsive    CENTRAL LINE: [ ] YES [ ] NO  LOCATION:   DATE INSERTED:  REMOVE: [ ] YES [ ] NO      CAROLYNN: [ ] YES [ ] NO    DATE INSERTED:  REMOVE:  [ ] YES [ ] NO      A-LINE:  [ ] YES [ ] NO  LOCATION:   DATE INSERTED:  REMOVE:  [ ] YES [ ] NO  EXPLAIN:    CODE STATUS: [x ] full code  [ ] DNR  [ ] DNI  [ ] [facility]  Goals of care discussion: [ ] yes

## 2024-01-20 NOTE — PROGRESS NOTE ADULT - SUBJECTIVE AND OBJECTIVE BOX
Patient is a 68y old  Male who presents with a chief complaint of Septic shock 2/2 pneumonia (19 Jan 2024 23:31)      BRIEF HOSPITAL COURSE:   69 yo m pmhx seizure disorder, hydrocephalus s/p  shunt, TBI and wheelchair bound presented after being found unresponsive admitted acute hypoxic respiratory failure, septic shock, pna, shane, anemia and hypokalemia. Concern for possible depakote toxicity, AEDS being held today with some mild improvement in overall MS.        Events last 24 hours:   called urgently to bedside 2/2 patient desatturating into the 70s and 80s.  fio2 initially increased to 50% by RT however continued to desaturate.  patient receiving full tv, with overall synchronous with the vent.  peep increased to 8 and patient rapidly desaturated.  fio2 increased to 100% with minimal improvement.  then manually ambu-bagged the patient with some improvement followed by deep suctioning with minimal secretions, repeated x2 with improvement in spo2.  Patient on fio2 100% and peep 5.  +bs b/l however diminished.  chest xray ordered.          PAST MEDICAL & SURGICAL HISTORY:  Seizure      Dementia        Allergies    No Known Drug Allergies  shellfish (Anaphylaxis)    Intolerances      FAMILY HISTORY:      Social History:       Review of Systems:      Physical Examination:    General: No acute distress.      HEENT: ett/ogt in place    PULM: diminished b/l    CVS: afib    ABD: Soft, nondistended, nontender, +bs    EXT: + edema    SKIN: Warm    NEURO: poor ms      Medications:  buMETAnide Injectable 1 milliGRAM(s) IV Push two times a day  midodrine. 20 milliGRAM(s) Oral three times a day  albuterol/ipratropium for Nebulization 3 milliLiter(s) Nebulizer every 6 hours  acetaminophen   Oral Liquid .. 650 milliGRAM(s) Oral every 6 hours PRN  aspirin  chewable 81 milliGRAM(s) Enteral Tube daily  heparin   Injectable 5000 Unit(s) SubCutaneous every 8 hours  polyethylene glycol 3350 17 Gram(s) Oral daily  chlorhexidine 0.12% Liquid 15 milliLiter(s) Oral Mucosa every 12 hours  chlorhexidine 2% Cloths 1 Application(s) Topical <User Schedule>  latanoprost 0.005% Ophthalmic Solution 1 Drop(s) Both EYES at bedtime      Mode: AC/ CMV (Assist Control/ Continuous Mandatory Ventilation)  RR (machine): 10  TV (machine): 450  FiO2: 40  PEEP: 5  ITime: 1  MAP: 9  PIP: 20      ICU Vital Signs Last 24 Hrs  T(C): 37.3 (20 Jan 2024 02:00), Max: 38 (20 Jan 2024 00:00)  T(F): 99.1 (20 Jan 2024 02:00), Max: 100.4 (20 Jan 2024 00:00)  HR: 108 (20 Jan 2024 02:00) (88 - 114)  BP: 118/67 (19 Jan 2024 22:00) (118/67 - 118/67)  BP(mean): 83 (19 Jan 2024 22:00) (83 - 83)  ABP: 121/62 (20 Jan 2024 02:00) (92/54 - 138/74)  ABP(mean): 83 (20 Jan 2024 02:00) (67 - 148)  RR: 12 (20 Jan 2024 02:00) (10 - 26)  SpO2: 100% (20 Jan 2024 02:00) (92% - 107%)    O2 Parameters below as of 20 Jan 2024 02:00  Patient On (Oxygen Delivery Method): ventilator    O2 Concentration (%): 50      Vital Signs Last 24 Hrs  T(C): 37.3 (20 Jan 2024 02:00), Max: 38 (20 Jan 2024 00:00)  T(F): 99.1 (20 Jan 2024 02:00), Max: 100.4 (20 Jan 2024 00:00)  HR: 108 (20 Jan 2024 02:00) (88 - 114)  BP: 118/67 (19 Jan 2024 22:00) (118/67 - 118/67)  BP(mean): 83 (19 Jan 2024 22:00) (83 - 83)  RR: 12 (20 Jan 2024 02:00) (10 - 26)  SpO2: 100% (20 Jan 2024 02:00) (92% - 107%)    Parameters below as of 20 Jan 2024 02:00  Patient On (Oxygen Delivery Method): ventilator    O2 Concentration (%): 50      I&O's Detail    18 Jan 2024 07:01  -  19 Jan 2024 07:00  --------------------------------------------------------  IN:    Bumetanide: 20 mL    Bumetanide: 180 mL    Enteral Tube Flush: 80 mL    IV PiggyBack: 200 mL    Nepro with Carb Steady: 1020 mL    Norepinephrine: 73.2 mL    Vasopressin: 12 mL  Total IN: 1585.2 mL    OUT:    Indwelling Catheter - Urethral (mL): 3890 mL  Total OUT: 3890 mL  Total NET: -2304.8 mL      19 Jan 2024 07:01  -  20 Jan 2024 04:29  --------------------------------------------------------  IN:    Nepro with Carb Steady: 900 mL  Total IN: 900 mL    OUT:    Indwelling Catheter - Urethral (mL): 1880 mL    Norepinephrine: 0 mL  Total OUT: 1880 mL  Total NET: -980 mL      LABS:                        7.9    13.00 )-----------( 59       ( 20 Jan 2024 02:07 )             26.2     01-20    143  |  100  |  120<H>  ----------------------------<  165<H>  3.7   |  36<H>  |  2.16<H>    Ca    8.7      20 Jan 2024 02:07  Phos  4.7     01-20  Mg     2.9     01-20    TPro  6.0  /  Alb  1.3<L>  /  TBili  0.3  /  DBili  x   /  AST  60<H>  /  ALT  14  /  AlkPhos  280<H>  01-20      CAPILLARY BLOOD GLUCOSE  POCT Blood Glucose.: 146 mg/dL (19 Jan 2024 18:08)      Urinalysis Basic - ( 20 Jan 2024 02:07 )  Color: x / Appearance: x / SG: x / pH: x  Gluc: 165 mg/dL / Ketone: x  / Bili: x / Urobili: x   Blood: x / Protein: x / Nitrite: x   Leuk Esterase: x / RBC: x / WBC x   Sq Epi: x / Non Sq Epi: x / Bacteria: x      CULTURES:  Culture Results:   Normal Respiratory Samantha present (01-14 @ 17:15)  Culture Results:   >100,000 CFU/ml Coag Negative Staphylococcus "Susceptibilities not  performed" (01-14 @ 12:37)  Culture Results:   No growth at 5 days (01-14 @ 10:20)  Culture Results:   No growth at 5 days (01-14 @ 10:20)      RADIOLOGY:   < from: Xray Chest 1 View- PORTABLE-Urgent (Xray Chest 1 View- PORTABLE-Urgent .) (01.14.24 @ 18:47) >    ACC: [id number] EXAM:  XR CHEST PORTABLE URGENT 1V   ORDERED BY: JEFF SEAMAN     PROCEDURE DATE:  01/14/2024      INTERPRETATION:  Exam:XR CHEST URGENT    clinical history:NG tube placement    Tip of nasogastric tube overlies left upperquadrant. Endotracheal and   left-sided central venous catheter appear in satisfactory position. No   pneumothorax. Diffuse left-sided consolidation with slight progression   from earlier today.    IMPRESSION: Support lines and tubes as above. Progression of left-sided   consolidation    --- End of Report ---    REEMA EMERSON MD; Attending Radiologist  This document has been electronically signed. Berny 15 2024 10:50AM    < end of copied text >

## 2024-01-20 NOTE — PROGRESS NOTE ADULT - ASSESSMENT
69 yo m pmhx seizure disorder, hydrocephalus s/p  shunt, TBI and wheelchair bound presented after being found unresponsive admitted acute hypoxic respiratory failure, septic shock, pna, shane, anemia and hypokalemia. Concern for possible depakote toxicity, AEDS being held today with some mild improvement in overall MS now with acute hypoxic respiratory failure.        NEURO: off sedation, continuing to hold AED  CV: diuresing as tolerated.  midodrine for bp support  RESP: hypoxic respiratory failure, ac/vc 4-6 cc/kg tv lung protective strategies. actively titrating fio2/peep for spo2 >92%, chest xray pending.  inh bronchodilatros   RENAL: shane, avoid nephrotoxic meds, renally dose meds, trend urine output, bun/cr and electrolytes. replace lytes as needed   GI: npo, ogt in place.  holding tf at this time pending further w/u  ENDO: no active issues  ID: completed course of abx, patient with low grade temp of 100.4F overnight. continue to monitor   HEME: heparin for vte ppx   DISPO: full code     Critical Care time: 35 mins assessing presenting problems of acute illness that poses high probability of life threatening deterioration or end organ damage/dysfunction.  Medical decision making including Initiating plan of care, reviewing data, reviewing radiology, discussing with multidisciplinary team, non inclusive of procedures, discussing goals of care with patient/family    DATE OF DOCUMENTATION EQUIVALENT TO DATE OF SERVICES RENDERED

## 2024-01-20 NOTE — PROGRESS NOTE ADULT - SUBJECTIVE AND OBJECTIVE BOX
Neurology Progress Note    S: Patient seen and examined.    MEDICATIONS:    acetaminophen   Oral Liquid .. 650 milliGRAM(s) Oral every 6 hours PRN  albuterol/ipratropium for Nebulization 3 milliLiter(s) Nebulizer every 6 hours  aspirin  chewable 81 milliGRAM(s) Enteral Tube daily  chlorhexidine 0.12% Liquid 15 milliLiter(s) Oral Mucosa every 12 hours  chlorhexidine 2% Cloths 1 Application(s) Topical <User Schedule>  heparin   Injectable 5000 Unit(s) SubCutaneous every 8 hours  latanoprost 0.005% Ophthalmic Solution 1 Drop(s) Both EYES at bedtime  midodrine. 20 milliGRAM(s) Oral three times a day  polyethylene glycol 3350 17 Gram(s) Oral daily  sodium chloride 3%  Inhalation 4 milliLiter(s) Inhalation every 6 hours      LABS:                          7.9    13.00 )-----------( 59       ( 20 Jan 2024 02:07 )             26.2     01-20    143  |  100  |  120<H>  ----------------------------<  165<H>  3.7   |  36<H>  |  2.16<H>    Ca    8.7      20 Jan 2024 02:07  Phos  4.7     01-20  Mg     2.9     01-20    TPro  6.0  /  Alb  1.3<L>  /  TBili  0.3  /  DBili  x   /  AST  60<H>  /  ALT  14  /  AlkPhos  280<H>  01-20    CAPILLARY BLOOD GLUCOSE          Urinalysis Basic - ( 20 Jan 2024 02:07 )    Color: x / Appearance: x / SG: x / pH: x  Gluc: 165 mg/dL / Ketone: x  / Bili: x / Urobili: x   Blood: x / Protein: x / Nitrite: x   Leuk Esterase: x / RBC: x / WBC x   Sq Epi: x / Non Sq Epi: x / Bacteria: x      I&O's Summary    19 Jan 2024 07:01  -  20 Jan 2024 07:00  --------------------------------------------------------  IN: 960 mL / OUT: 2405 mL / NET: -1445 mL    20 Jan 2024 07:01  -  20 Jan 2024 22:19  --------------------------------------------------------  IN: 180 mL / OUT: 665 mL / NET: -485 mL      Vital Signs Last 24 Hrs  T(C): 37.7 (20 Jan 2024 22:00), Max: 38 (20 Jan 2024 00:00)  T(F): 99.9 (20 Jan 2024 22:00), Max: 100.4 (20 Jan 2024 00:00)  HR: 96 (20 Jan 2024 22:00) (92 - 145)  BP: 114/80 (20 Jan 2024 08:00) (109/70 - 114/80)  BP(mean): 89 (20 Jan 2024 08:00) (83 - 89)  RR: 11 (20 Jan 2024 22:00) (10 - 100)  SpO2: 100% (20 Jan 2024 22:00) (83% - 100%)    Parameters below as of 20 Jan 2024 22:00  Patient On (Oxygen Delivery Method): ventilator    O2 Concentration (%): 40      General Exam:   General Appearance:  intubated, not sedated  Head: Normocephalic, atraumatic and no dysmorphic features  Ear, Nose, and Throat: Moist mucous membranes  CVS: S1S2+  Resp: mechanical  GI: soft NT/ND  Extremities: No edema or cyanosis  Skin: No bruises or rashes     Neurological Exam:  Mental Status: eyes closed, does not respond to verbal or noxious stimuli. Does not follow commands.  Cranial Nerves: PERRL, no BTT, no facial palsy   Motor: trace spontaneous movement of RUE noted , inc tone in LE  Sensation: minimal withdrawl to noxious in RUE  Reflexes: 1+ throughout at biceps, brachioradialis, triceps, patellars and ankles bilaterally and equal. No clonus. R toe and L toe were both downgoing.  Coordination: unable  Gait: unable      I personally reviewed the below data/images/labs:        < from: CT Head No Cont (01.14.24 @ 11:25) >  IMPRESSION:    1)  chronic ischemic changes with atrophy, most severe in the frontal   lobes. Severe atrophic changes of the anterior corpus callosum and   periventricular white matter..  2)  chronic dural thickening and plaque calcification noted bilaterally,   with no acute hemorrhagic collections suggested.  3. Ventriculostomy noted in situ without evidence for hydrocephalus.    < end of copied text >

## 2024-01-20 NOTE — PROGRESS NOTE ADULT - ASSESSMENT
68M w/dementia, seizure disorder, hydrocephalus s/p  shunt, prior TBI admitted for AMS and acute hypoxemic respiratory failure 2/2 PNA and pulm edema. Now with persistent hypotension due to septic shock. Neuroloy consulted for AED mgmt.   Poor mental status on exam, remains intubated but off sedation  VPA level 93 -> 87 -> free level 57.9. ALthough levels checked at random intervals, albumin has remained 1.1 which leads to severe supratherapeutic VPA level  Home meds also list Vimpat - unclear what regimen is?  VPA recently lowered by outpatient neurologist due to concerns for lethargy  Ammonia 35 -> 40  Leukocytosis improving  CT with chronic ischemic changes most prominent in bifrontal, prior ventriculostomy     AMS - likely multifactorial due to sepsis, VPA toxicity  Hyperammonemia - ?related to VPA vs other etioloy       Plan:  - now off depakote  - Consider Vimpat 150 mg BID  - routine EEG  - supportive care per ICU  - if mental status remains unchanged consider repeating imaging with MRI and obtaining eeg

## 2024-01-20 NOTE — PROGRESS NOTE ADULT - ASSESSMENT
Pt is a 69 yo BM with h/o TBI, hydrocephalus s/p  shunt, Sz d/o and dementia admitted for AMS and acute hypoxemic respiratory failure. Respiratory failure likely secondary to PNA and acute pulmonary edema. Hypotension likely secondary to severe sepsis with septic shock. Presumed SOFIA ( unknown baseline) likely ATN. Possible depakote toxicity as etiology for AMS, level is very supratherapeutic. Overnight pt desat'd and CXR revealed R UL atelectasis     Resp: Aggressive pulm toilet  CVS: Taper Midodrine as tolerated  HEME: DVT prophylaxis with sq Heparin  FEN: Cont enteral feeds/ Replace K; pt hypokalemic  Neuro: MS ? improving/ Neuro f/u noted  Social: Family at the bedside and updated       Pt is a 67 yo BM with h/o TBI, hydrocephalus s/p  shunt, Sz d/o and dementia admitted for AMS and acute hypoxemic respiratory failure. Respiratory failure likely 2 to L lung atelectasis and PNA.  MS change 2 to Depakote toxicity and ? uremia. Overnight pt desat'd and CXR revealed R UL atelectasis     Resp: Aggressive pulm toilet  CVS: Taper Midodrine as tolerated  HEME: DVT prophylaxis with sq Heparin  FEN/Renal: Cont enteral feeds/ IV hydration for increased BUN/ Follow BUN/Cr and UO  Neuro: Neuro f/u prn  Social: Family at the bedside and updated

## 2024-01-21 NOTE — CHART NOTE - NSCHARTNOTEFT_GEN_A_CORE
:  MELISSA Ruiz dr  Indication:  oliguria     PROCEDURE:  [x ] LIMITED ECHO  [x ] LIMITED CHEST  [ ] LIMITED RETROPERITONEAL  [ ] LIMITED ABDOMINAL  [ ] LIMITED DVT  [ ] NEEDLE GUIDANCE VASCULAR  [ ] NEEDLE GUIDANCE THORACENTESIS  [ ] NEEDLE GUIDANCE PARACENTESIS  [ ] NEEDLE GUIDANCE PERICARDIOCENTESIS  [ ] OTHER    FINDINGS:  Chest: A-line predominant, normal aeration pattern bilat. No effusions bilat.    ECHO: LV> RV Grossly normal RV and LV function with no wall motion abnormalities, nor septal bowing/flattening  No pericardial effusion  IVC: collapses with respirations   LVOT/VTi: 19cm    INTERPRETATION:  nl lung exam  grossly nl LV function  volume tolerant     images uploaded to [facility]

## 2024-01-21 NOTE — EEG REPORT - NS EEG TEXT BOX
NISREEN HINTON [id number]     Study Date: 		01-21-24  Duratio:  x 24 mins    --------------------------------------------------------------------------------------------------  History:  CC/ HPI Patient is a 68y old  Male who presents with a chief complaint of Septic shock 2/2 pneumonia (21 Jan 2024 11:52)    MEDICATIONS  (STANDING):  aspirin  chewable 81 milliGRAM(s) Enteral Tube daily  chlorhexidine 0.12% Liquid 15 milliLiter(s) Oral Mucosa every 12 hours  chlorhexidine 2% Cloths 1 Application(s) Topical <User Schedule>  heparin   Injectable 5000 Unit(s) SubCutaneous every 8 hours  lacosamide Solution 150 milliGRAM(s) Oral two times a day  lactated ringers. 1000 milliLiter(s) (75 mL/Hr) IV Continuous <Continuous>  latanoprost 0.005% Ophthalmic Solution 1 Drop(s) Both EYES at bedtime  midodrine. 20 milliGRAM(s) Oral three times a day  polyethylene glycol 3350 17 Gram(s) Oral daily    --------------------------------------------------------------------------------------------------  Study Interpretation:    [Abbreviation Key:  PDR=alpha rhythm/posterior dominant rhythm. A-P=anterior posterior.  Amplitude: ‘very low’:<20; ‘low’:20-49; ‘medium’:; ‘high’:>150uV.  Persistence for periodic/rhythmic patterns (% of epoch) ‘rare’:<1%; ‘occasional’:1-10%; ‘frequent’:10-50%; ‘abundant’:50-90%; ‘continuous’:>90%.  Persistence for sporadic discharges: ‘rare’:<1/hr; ‘occasional’:1/min-1/hr; ‘frequent’:>1/min; ‘abundant’:>1/10 sec.  RPP=rhythmic and periodic patterns; GRDA=generalized rhythmic delta activity; FIRDA=frontal intermittent GRDA; LRDA=lateralized rhythmic delta activity; TIRDA=temporal intermittent rhythmic delta activity;  LPD=PLED=lateralized periodic discharges; GPD=generalized periodic discharges; BIPDs =bilateral independent periodic discharges; Mf=multifocal; SIRPDs=stimulus induced rhythmic, periodic, or ictal appearing discharges; BIRDs=brief potentially ictal rhythmic discharges >4 Hz, lasting .5-10s; PFA (paroxysmal bursts >13 Hz or =8 Hz <10s).  Modifiers: +F=with fast component; +S=with spike component; +R=with rhythmic component.  S-B=burst suppression pattern.  Max=maximal. N1-drowsy; N2-stage II sleep; N3-slow wave sleep. SSS/BETS=small sharp spikes/benign epileptiform transients of sleep. HV=hyperventilation; PS=photic stimulation]    FINDINGS:      Background:  Continuity: continuous  Symmetry: symmetric  PDR: absent  Reactivity: present  Voltage: normal (between 20-150uV)  Anterior Posterior Gradient: absent  Other background findings: none  Breach: breach effect over right frontal region    Background Slowing:  Generalized slowing: diffuse irregular delta and theta activity.  Focal slowing: intermittent right frontal irregular delta activity was present.    State Changes:   -N2 sleep transients were not recorded.    Sporadic Epileptiform Discharges:    None    Rhythmic and Periodic Patterns (RPPs):  None     Electrographic and Electroclinical seizures:  None    Other Clinical Events:  None    Activation Procedures:   -Hyperventilation was not performed.    -Photic stimulation was not performed.     Artifacts:  Intermittent myogenic and movement artifacts were noted.    ECG:  The heart rate on single channel ECG was predominantly between 80-90 BPM.    EEG Classification / Summary:  Abnormal EEG study  Focal intermittent right frontal slowing  Right sided breach effect  Moderate generalized background slowing    -----------------------------------------------------------------------------------------------------    Clinical Impression:  Right frontal focal cerebral dysfunction  Right sided breach effect suggest skull defect on that region  Moderate diffuse/multi-focal cerebral dysfunction, not specific as to etiology.  There were no epileptiform abnormalities recorded.    This is a preliminary report pending attending review and attestation.    Morgan Riggs MD  Fellow, [facility]      -------------------------------------------------------------------------------------------------------  [facility] EEG Reading Room Ph#: [phone]  Epilepsy Answering Service after 5PM and before 8:30AM: Ph#: [phone]   NISREEN HINTON [id number]     Study Date: 		01-21-24  Duratio:  x 24 mins    --------------------------------------------------------------------------------------------------  History:  CC/ HPI Patient is a 68y old  Male who presents with a chief complaint of Septic shock 2/2 pneumonia (21 Jan 2024 11:52)    MEDICATIONS  (STANDING):  aspirin  chewable 81 milliGRAM(s) Enteral Tube daily  chlorhexidine 0.12% Liquid 15 milliLiter(s) Oral Mucosa every 12 hours  chlorhexidine 2% Cloths 1 Application(s) Topical <User Schedule>  heparin   Injectable 5000 Unit(s) SubCutaneous every 8 hours  lacosamide Solution 150 milliGRAM(s) Oral two times a day  lactated ringers. 1000 milliLiter(s) (75 mL/Hr) IV Continuous <Continuous>  latanoprost 0.005% Ophthalmic Solution 1 Drop(s) Both EYES at bedtime  midodrine. 20 milliGRAM(s) Oral three times a day  polyethylene glycol 3350 17 Gram(s) Oral daily    --------------------------------------------------------------------------------------------------  Study Interpretation:    [Abbreviation Key:  PDR=alpha rhythm/posterior dominant rhythm. A-P=anterior posterior.  Amplitude: ‘very low’:<20; ‘low’:20-49; ‘medium’:; ‘high’:>150uV.  Persistence for periodic/rhythmic patterns (% of epoch) ‘rare’:<1%; ‘occasional’:1-10%; ‘frequent’:10-50%; ‘abundant’:50-90%; ‘continuous’:>90%.  Persistence for sporadic discharges: ‘rare’:<1/hr; ‘occasional’:1/min-1/hr; ‘frequent’:>1/min; ‘abundant’:>1/10 sec.  RPP=rhythmic and periodic patterns; GRDA=generalized rhythmic delta activity; FIRDA=frontal intermittent GRDA; LRDA=lateralized rhythmic delta activity; TIRDA=temporal intermittent rhythmic delta activity;  LPD=PLED=lateralized periodic discharges; GPD=generalized periodic discharges; BIPDs =bilateral independent periodic discharges; Mf=multifocal; SIRPDs=stimulus induced rhythmic, periodic, or ictal appearing discharges; BIRDs=brief potentially ictal rhythmic discharges >4 Hz, lasting .5-10s; PFA (paroxysmal bursts >13 Hz or =8 Hz <10s).  Modifiers: +F=with fast component; +S=with spike component; +R=with rhythmic component.  S-B=burst suppression pattern.  Max=maximal. N1-drowsy; N2-stage II sleep; N3-slow wave sleep. SSS/BETS=small sharp spikes/benign epileptiform transients of sleep. HV=hyperventilation; PS=photic stimulation]    FINDINGS:      Background:  Continuity: continuous  Symmetry: symmetric  PDR: absent  Reactivity: present  Voltage: normal (between 20-150uV)  Anterior Posterior Gradient: absent  Other background findings: none  Breach: breach effect over right frontal region    Background Slowing:  Generalized slowing: diffuse irregular delta and theta activity.  Focal slowing: intermittent right frontal irregular delta activity was present.    State Changes:   -N2 sleep transients were not recorded.    Sporadic Epileptiform Discharges:    None    Rhythmic and Periodic Patterns (RPPs):  None     Electrographic and Electroclinical seizures:  None    Other Clinical Events:  None    Activation Procedures:   -Hyperventilation was not performed.    -Photic stimulation was not performed.     Artifacts:  Intermittent myogenic and movement artifacts were noted.    ECG:  The heart rate on single channel ECG was predominantly between 80-90 BPM.    EEG Classification / Summary:  Abnormal EEG study  Focal intermittent right frontal slowing  Right sided breach effect  Moderate generalized background slowing    -----------------------------------------------------------------------------------------------------    Clinical Impression:  Right frontal focal cerebral dysfunction  Right sided breach effect suggest skull defect on that region  Moderate diffuse/multi-focal cerebral dysfunction, not specific as to etiology.  There were no epileptiform abnormalities recorded.        Morgan Riggs MD  Fellow, [facility]      -------------------------------------------------------------------------------------------------------  [facility] EEG Reading Room Ph#: [phone]  Epilepsy Answering Service after 5PM and before 8:30AM: Ph#: [phone]

## 2024-01-21 NOTE — PROGRESS NOTE ADULT - SUBJECTIVE AND OBJECTIVE BOX
Patient is a 68y old  Male who presents with a chief complaint of Septic shock 2/2 pneumonia (20 Jan 2024 22:18)      BRIEF HOSPITAL COURSE: ***    Events last 24 hours: ***    PAST MEDICAL & SURGICAL HISTORY:  Seizure      Dementia        Allergies    No Known Drug Allergies  shellfish (Anaphylaxis)    Intolerances      FAMILY HISTORY:      Social History:     Review of Systems:  CONSTITUTIONAL: No fever, chills, or fatigue  EYES: No eye pain, visual disturbances, or discharge  ENMT:  No difficulty hearing, tinnitus, vertigo; No sinus or throat pain  NECK: No pain or stiffness  RESPIRATORY: No cough, wheezing, chills or hemoptysis; No shortness of breath  CARDIOVASCULAR: No chest pain, palpitations, dizziness, or leg swelling  GASTROINTESTINAL: No abdominal or epigastric pain. No nausea, vomiting, or hematemesis; No diarrhea or constipation. No melena or hematochezia.  GENITOURINARY: No dysuria, frequency, hematuria, or incontinence  NEUROLOGICAL: No headaches, memory loss, loss of strength, numbness, or tremors  SKIN: No itching, burning, rashes, or lesions   MUSCULOSKELETAL: No joint pain or swelling; No muscle, back, or extremity pain  PSYCHIATRIC: No depression, anxiety, mood swings, or difficulty sleeping      Vitals During Exam:   HR:   BP:   RR:  sPO2:     Physical Examination:    General: No acute distress.      HEENT: Pupils equal, reactive to light.  Symmetric.    PULM: Clear to auscultation bilaterally, no significant sputum production    CVS: Regular rate and rhythm, no murmurs, rubs, or gallops    ABD: Soft, nondistended, nontender, normoactive bowel sounds, no masses    EXT: No edema, nontender    SKIN: Warm and well perfused, no rashes noted.    NEURO: Alert, oriented, interactive, nonfocal      Medications:    midodrine. 20 milliGRAM(s) Oral three times a day    albuterol/ipratropium for Nebulization 3 milliLiter(s) Nebulizer every 6 hours  sodium chloride 3%  Inhalation 4 milliLiter(s) Inhalation every 6 hours    acetaminophen   Oral Liquid .. 650 milliGRAM(s) Oral every 6 hours PRN      aspirin  chewable 81 milliGRAM(s) Enteral Tube daily  heparin   Injectable 5000 Unit(s) SubCutaneous every 8 hours    lactulose Syrup 20 Gram(s) Oral every 8 hours  polyethylene glycol 3350 17 Gram(s) Oral daily            chlorhexidine 0.12% Liquid 15 milliLiter(s) Oral Mucosa every 12 hours  chlorhexidine 2% Cloths 1 Application(s) Topical <User Schedule>  latanoprost 0.005% Ophthalmic Solution 1 Drop(s) Both EYES at bedtime        Mode: AC/ CMV (Assist Control/ Continuous Mandatory Ventilation)  RR (machine): 10  TV (machine): 450  FiO2: 40  PEEP: 5  ITime: 0.9  MAP: 9  PIP: 20      ICU Vital Signs Last 24 Hrs  T(C): 38 (21 Jan 2024 04:00), Max: 38 (21 Jan 2024 03:00)  T(F): 100.4 (21 Jan 2024 04:00), Max: 100.4 (21 Jan 2024 03:00)  HR: 99 (21 Jan 2024 04:00) (92 - 145)  BP: 114/80 (20 Jan 2024 08:00) (109/70 - 114/80)  BP(mean): 89 (20 Jan 2024 08:00) (83 - 89)  ABP: 103/54 (21 Jan 2024 04:00) (75/43 - 143/88)  ABP(mean): 74 (21 Jan 2024 04:00) (53 - 108)  RR: 11 (21 Jan 2024 04:00) (10 - 100)  SpO2: 100% (21 Jan 2024 04:00) (83% - 100%)    O2 Parameters below as of 21 Jan 2024 02:00  Patient On (Oxygen Delivery Method): ventilator    O2 Concentration (%): 40      Vital Signs Last 24 Hrs  T(C): 38 (21 Jan 2024 04:00), Max: 38 (21 Jan 2024 03:00)  T(F): 100.4 (21 Jan 2024 04:00), Max: 100.4 (21 Jan 2024 03:00)  HR: 99 (21 Jan 2024 04:00) (92 - 145)  BP: 114/80 (20 Jan 2024 08:00) (109/70 - 114/80)  BP(mean): 89 (20 Jan 2024 08:00) (83 - 89)  RR: 11 (21 Jan 2024 04:00) (10 - 100)  SpO2: 100% (21 Jan 2024 04:00) (83% - 100%)    Parameters below as of 21 Jan 2024 02:00  Patient On (Oxygen Delivery Method): ventilator    O2 Concentration (%): 40        I&O's Detail    19 Jan 2024 07:01  -  20 Jan 2024 07:00  --------------------------------------------------------  IN:    Nepro with Carb Steady: 960 mL  Total IN: 960 mL    OUT:    Indwelling Catheter - Urethral (mL): 2405 mL    Norepinephrine: 0 mL  Total OUT: 2405 mL    Total NET: -1445 mL      20 Jan 2024 07:01  -  21 Jan 2024 04:14  --------------------------------------------------------  IN:    Nepro with Carb Steady: 420 mL  Total IN: 420 mL    OUT:    Indwelling Catheter - Urethral (mL): 765 mL  Total OUT: 765 mL    Total NET: -345 mL            LABS:                        7.2    14.77 )-----------( 174      ( 21 Jan 2024 02:42 )             23.8     01-21    146<H>  |  103  |  139<H>  ----------------------------<  160<H>  4.1   |  35<H>  |  2.49<H>    Ca    8.5      21 Jan 2024 02:42  Phos  4.6     01-21  Mg     3.0     01-21    TPro  5.8<L>  /  Alb  1.3<L>  /  TBili  0.3  /  DBili  x   /  AST  92<H>  /  ALT  21  /  AlkPhos  292<H>  01-21          CAPILLARY BLOOD GLUCOSE      POCT Blood Glucose.: 146 mg/dL (19 Jan 2024 18:08)      Urinalysis Basic - ( 21 Jan 2024 02:42 )    Color: x / Appearance: x / SG: x / pH: x  Gluc: 160 mg/dL / Ketone: x  / Bili: x / Urobili: x   Blood: x / Protein: x / Nitrite: x   Leuk Esterase: x / RBC: x / WBC x   Sq Epi: x / Non Sq Epi: x / Bacteria: x      CULTURES:  Culture Results:   Normal Respiratory Samantha present (01-14 @ 17:15)  Culture Results:   >100,000 CFU/ml Coag Negative Staphylococcus "Susceptibilities not  performed" (01-14 @ 12:37)  Culture Results:   No growth at 5 days (01-14 @ 10:20)  Culture Results:   No growth at 5 days (01-14 @ 10:20)        RADIOLOGY:   < from: Xray Chest 1 View-PORTABLE IMMEDIATE (Xray Chest 1 View-PORTABLE IMMEDIATE .) (01.20.24 @ 07:05) >  ACC: [id number] EXAM:  XR CHEST PORTABLE IMMED 1V   ORDERED BY: BELINDA JACKSON     PROCEDURE DATE:  01/20/2024      INTERPRETATION:  AP chest on January 20, 2024 at 5:52 AM. Patient   requires intubation. Patient is hypoxic.    Heart size normal.    Endotracheal tube and nasogastric tube remain. Left jugular line seen on   January 14 is been removed.    On January 14 there was a significant diffuse left lung infiltrate which   shows markedly improved aeration. There is some atelectatic residual off   the left lower hilum.    Present film shows atelectasis of the right upper lobe.    IMPRESSION: Significant improvement in left lung infiltrate. Presently   there is atelectasis of the right upper lobe. Left jugular line removed.    --- End of Report ---            TINO PAZ MD; Attending Radiologist  This document has been electronically signed. Jan 20 2024  9:14AM    < end of copied text >

## 2024-01-21 NOTE — PROGRESS NOTE ADULT - ASSESSMENT
Pt is a 69 yo BM with h/o TBI, hydrocephalus s/p  shunt, Sz d/o and dementia admitted for AMS and acute hypoxemic respiratory failure. Respiratory failure likely 2 to L lung atelectasis and PNA. MS change 2 to Depakote toxicity and ? uremia. Overnight 1/9 -> 1/20 pt desat'd and CXR revealed R UL atelectasis     Resp: Cont aggressive pulm toilet/ Repeat CXR re-expansion of R UL atelectasis/ Daily SBT  CVS: Taper Midodrine as tolerated  HEME: DVT prophylaxis with sq Heparin  FEN/Renal: Cont enteral feeds/ IV hydration for increased BUN/ Follow BUN/Cr and UO  Neuro: EEG and Neuro f/u noted  Social: 1/20 family at the bedside and updated   Pt is a 69 yo BM with h/o TBI, hydrocephalus s/p  shunt, Sz d/o and dementia admitted for AMS and acute hypoxemic respiratory failure. Respiratory failure likely 2 to L lung atelectasis and PNA. MS change 2 to Depakote toxicity and ? uremia. Overnight 1/9 -> 1/20 pt desat'd and CXR revealed R UL atelectasis     Resp: Cont aggressive pulm toilet/ Repeat CXR re-expansion of R UL atelectasis/ Daily SBT  CVS: Taper Midodrine as tolerated  HEME: DVT prophylaxis with sq Heparin  FEN/Renal: Cont enteral feeds/ Start IV hydration for increased BUN/Cr/ Follow BUN/Cr and UO  Neuro: EEG and Neuro f/u noted  Social: 1/20 family at the bedside and updated

## 2024-01-21 NOTE — PROGRESS NOTE ADULT - ASSESSMENT
68M w/dementia, seizure disorder, hydrocephalus s/p  shunt, prior TBI admitted for AMS and acute hypoxemic respiratory failure 2/2 PNA and pulm edema. Now with persistent hypotension due to septic shock. Neuroloy consulted for AED mgmt.   Poor mental status on exam, remains intubated but off sedation  VPA level 93 -> 87 -> free level 57.9. ALthough levels checked at random intervals, albumin has remained 1.1 which leads to severe supratherapeutic VPA level  Home meds also list Vimpat - unclear what regimen is?  VPA recently lowered by outpatient neurologist due to concerns for lethargy  Ammonia 35 -> 40  Leukocytosis improving  CT with chronic ischemic changes most prominent in bifrontal, prior ventriculostomy     AMS - likely multifactorial due to sepsis, VPA toxicity  Hyperammonemia - ?related to VPA vs other etioloy       Plan:  - now off depakote  - Consider Vimpat 150 mg BID  - routine EEG  - supportive care per ICU  - if mental status remains unchanged consider repeating imaging with MRI and obtaining eeg         68M w/dementia, seizure disorder, hydrocephalus s/p  shunt, prior TBI admitted for AMS and acute hypoxemic respiratory failure 2/2 PNA and pulm edema. Now with persistent hypotension due to septic shock. Neuroloy consulted for AED mgmt.   Poor mental status on exam, remains intubated but off sedation  VPA level 93 -> 87 -> free level 57.9. ALthough levels checked at random intervals, albumin has remained 1.1 which leads to severe supratherapeutic VPA level  Home meds also list Vimpat - unclear what regimen is?  VPA recently lowered by outpatient neurologist due to concerns for lethargy  Ammonia 35 -> 40  Leukocytosis improving  CT with chronic ischemic changes most prominent in bifrontal, prior ventriculostomy     AMS - likely multifactorial due to sepsis, VPA toxicity  Hyperammonemia - ?related to VPA vs other etioloy       Plan:  - now off depakote  -repeat VPA level  - when corrected VPA level wnl, start Depakote 250 mg BID and repeat VPA levels 48 hours after  - routine EEG  - supportive care per ICU  - if mental status remains unchanged consider repeating imaging with MRI and obtaining eeg

## 2024-01-21 NOTE — PROGRESS NOTE ADULT - SUBJECTIVE AND OBJECTIVE BOX
HPI:  Pt is a 69 yo BM with h/o TBI, hydrocephalus s/p  shunt, Sz d/o and dementia admitted for AMS and acute hypoxemic respiratory failure. Respiratory failure likely 2 to L lung atelectasis and PNA. MS change 2 to Depakote toxicity and ? uremia. Overnight  ->  pt desat'd and CXR revealed R UL atelectasis       ## Labs:  CBC:                        7.2    14.77 )-----------( 174      ( 2024 02:42 )             23.8     Chem:      147<H>  |  104  |  148  ----------------------------<  225<H>  4.0   |  36<H>  |  2.57<H>    Ca    9.1      2024 10:35  Phos  4.4       Mg     3.3         TPro  6.2  /  Alb  1.4<L>  /  TBili  0.1<L>  /  DBili  x   /  AST  80<H>  /  ALT  17  /  AlkPhos  299<H>      Coags:          ## Imaging:    ## Medications:    midodrine. 20 milliGRAM(s) Oral three times a day        aspirin  chewable 81 milliGRAM(s) Enteral Tube daily  heparin   Injectable 5000 Unit(s) SubCutaneous every 8 hours    polyethylene glycol 3350 17 Gram(s) Oral daily    acetaminophen   Oral Liquid .. 650 milliGRAM(s) Oral every 6 hours PRN  lacosamide Solution 150 milliGRAM(s) Oral two times a day      ## Vitals:  T(C): 38 (24 @ 15:00), Max: 38.1 (24 @ 05:00)  HR: 102 (24 @ 15:00) (92 - 110)  BP: --  BP(mean): --  RR: 14 (24 @ 15:00) (10 - 100)  SpO2: 100% (24 @ 15:00) (98% - 100%)  Wt(kg): --  Vent: Mode: CPAP with PS, RR (patient): 15, FiO2: 40, PEEP: 5, PS: 10, PIP: 15  AB-20 @ 07: @ 07:00  --------------------------------------------------------  IN: 660 mL / OUT: 1015 mL / NET: -355 mL     @ 07: @ 15:20  --------------------------------------------------------  IN: 615 mL / OUT: 490 mL / NET: 125 mL          ## P/E:  Gen: lying comfortably in bed in no apparent distress  Mouth: (+) ETT/ OGT  Lungs: CTA  Heart: Tachy  Abd: Soft/+BS/ Non-tender  Ext: Hand edema  Neuro: Open eyes to verbal stimuli    CENTRAL LINE: [ ] YES [ ] NO  LOCATION:   DATE INSERTED:  REMOVE: [ ] YES [ ] NO      CAROLYNN: [ ] YES [ ] NO    DATE INSERTED:  REMOVE:  [ ] YES [ ] NO      A-LINE:  [ ] YES [ ] NO  LOCATION:   DATE INSERTED:  REMOVE:  [ ] YES [ ] NO  EXPLAIN:    CODE STATUS: [x ] full code  [ ] DNR  [ ] DNI  [ ] [facility]  Goals of care discussion: [ ] yes

## 2024-01-21 NOTE — PROGRESS NOTE ADULT - SUBJECTIVE AND OBJECTIVE BOX
Neurology Progress Note    S: Patient seen and examined.    MEDICATIONS:    acetaminophen   Oral Liquid .. 650 milliGRAM(s) Oral every 6 hours PRN  albuterol/ipratropium for Nebulization 3 milliLiter(s) Nebulizer every 6 hours  aspirin  chewable 81 milliGRAM(s) Enteral Tube daily  chlorhexidine 0.12% Liquid 15 milliLiter(s) Oral Mucosa every 12 hours  chlorhexidine 2% Cloths 1 Application(s) Topical <User Schedule>  heparin   Injectable 5000 Unit(s) SubCutaneous every 8 hours  lactated ringers. 1000 milliLiter(s) IV Continuous <Continuous>  latanoprost 0.005% Ophthalmic Solution 1 Drop(s) Both EYES at bedtime  midodrine. 20 milliGRAM(s) Oral three times a day  polyethylene glycol 3350 17 Gram(s) Oral daily  sodium chloride 3%  Inhalation 4 milliLiter(s) Inhalation every 6 hours      LABS:                          7.2    14.77 )-----------( 174      ( 21 Jan 2024 02:42 )             23.8     01-21    147<H>  |  104  |  148  ----------------------------<  225<H>  4.0   |  36<H>  |  2.57<H>    Ca    9.1      21 Jan 2024 10:35  Phos  4.4     01-21  Mg     3.3     01-21    TPro  6.2  /  Alb  1.4<L>  /  TBili  0.1<L>  /  DBili  x   /  AST  80<H>  /  ALT  17  /  AlkPhos  299<H>  01-21    CAPILLARY BLOOD GLUCOSE      POCT Blood Glucose.: 241 mg/dL (21 Jan 2024 11:08)      Urinalysis Basic - ( 21 Jan 2024 10:35 )    Color: x / Appearance: x / SG: x / pH: x  Gluc: 225 mg/dL / Ketone: x  / Bili: x / Urobili: x   Blood: x / Protein: x / Nitrite: x   Leuk Esterase: x / RBC: x / WBC x   Sq Epi: x / Non Sq Epi: x / Bacteria: x      I&O's Summary    20 Jan 2024 07:01  -  21 Jan 2024 07:00  --------------------------------------------------------  IN: 660 mL / OUT: 1015 mL / NET: -355 mL    21 Jan 2024 07:01  -  21 Jan 2024 11:53  --------------------------------------------------------  IN: 315 mL / OUT: 230 mL / NET: 85 mL      Vital Signs Last 24 Hrs  T(C): 37.8 (21 Jan 2024 11:00), Max: 38.1 (21 Jan 2024 05:00)  T(F): 100 (21 Jan 2024 11:00), Max: 100.6 (21 Jan 2024 05:00)  HR: 99 (21 Jan 2024 11:00) (92 - 116)  BP: --  BP(mean): --  RR: 19 (21 Jan 2024 11:00) (10 - 100)  SpO2: 99% (21 Jan 2024 11:00) (98% - 100%)    Parameters below as of 21 Jan 2024 06:00  Patient On (Oxygen Delivery Method): ventilator    O2 Concentration (%): 40      General Exam:   General Appearance:  intubated, not sedated  Head: Normocephalic, atraumatic and no dysmorphic features  Ear, Nose, and Throat: Moist mucous membranes  CVS: S1S2+  Resp: mechanical  GI: soft NT/ND  Extremities: No edema or cyanosis  Skin: No bruises or rashes     Neurological Exam:  Mental Status: eyes closed, does not respond to verbal or noxious stimuli. Does not follow commands.  Cranial Nerves: PERRL, no BTT, no facial palsy   Motor: trace spontaneous movement of RUE noted , inc tone in LE  Sensation: minimal withdrawl to noxious in RUE  Reflexes: 1+ throughout at biceps, brachioradialis, triceps, patellars and ankles bilaterally and equal. No clonus. R toe and L toe were both downgoing.  Coordination: unable  Gait: unable      I personally reviewed the below data/images/labs:        < from: CT Head No Cont (01.14.24 @ 11:25) >  IMPRESSION:    1)  chronic ischemic changes with atrophy, most severe in the frontal   lobes. Severe atrophic changes of the anterior corpus callosum and   periventricular white matter..  2)  chronic dural thickening and plaque calcification noted bilaterally,   with no acute hemorrhagic collections suggested.  3. Ventriculostomy noted in situ without evidence for hydrocephalus.    < end of copied text >

## 2024-01-22 NOTE — PROGRESS NOTE ADULT - ASSESSMENT
68M w/dementia, seizure disorder, hydrocephalus s/p  shunt, prior TBI admitted for AMS and acute hypoxemic respiratory failure 2/2 PNA and pulm edema. Now with persistent hypotension due to septic shock. Neuroloy consulted for AED mgmt.   Poor mental status on exam, remains intubated but off sedation  VPA level 93 -> 87 -> free level 57.9. VPA level 1/21 4  VPA recently lowered by outpatient neurologist due to concerns for lethargy  Ammonia 35 -> 40  Leukocytosis improving  CT with chronic ischemic changes most prominent in bifrontal, prior ventriculostomy     AMS - likely multifactorial due to sepsis, VPA toxicity  Hyperammonemia - ?related to VPA vs other etioloy       Plan:  -  on Lamictal 150 mg BID- asses how patient responds  - routine EEG  - supportive care per ICU  - if mental status remains unchanged consider repeating imaging with MRI and obtaining eeg         68M w/dementia, seizure disorder, hydrocephalus s/p  shunt, prior TBI admitted for AMS and acute hypoxemic respiratory failure 2/2 PNA and pulm edema. Now with persistent hypotension due to septic shock. Neuroloy consulted for AED mgmt.   Poor mental status on exam, remains intubated but off sedation  VPA level 93 -> 87 -> free level 57.9. VPA level 1/21 4  VPA recently lowered by outpatient neurologist due to concerns for lethargy  Ammonia 35 -> 40  Leukocytosis improving  CT with chronic ischemic changes most prominent in bifrontal, prior ventriculostomy     AMS - likely multifactorial due to sepsis, VPA toxicity  Hyperammonemia - ?related to VPA vs other etioloy       Plan:  -  on vimpat 150 mg BID- asses how patient responds  - routine EEG  - supportive care per ICU  - if mental status remains unchanged consider repeating imaging with MRI and obtaining eeg

## 2024-01-22 NOTE — PROGRESS NOTE ADULT - ASSESSMENT
Pt is a 69 yo BM with h/o TBI, hydrocephalus s/p  shunt, Sz d/o and dementia admitted for AMS and acute hypoxemic respiratory failure. Respiratory failure likely 2 to L lung atelectasis and PNA. MS change 2 to Depakote toxicity and ? uremia. Overnight 1/9 -> 1/20 pt desat'd and CXR revealed R UL atelectasis     Neuro: has not been on sedation. remains off sedation.  Resp: Cont aggressive pulm toilet. SBT today.  CVS: Taper Midodrine as tolerated  HEME: DVT prophylaxis with sq Heparin  FEN/Renal: Cont enteral feeds/ Start IV hydration for increased BUN/Cr/ Follow BUN/Cr and UO  dispo: possible extubation? Pt is a 69 yo BM with h/o TBI, hydrocephalus s/p  shunt, Sz d/o and dementia admitted for AMS and acute hypoxemic respiratory failure. Respiratory failure likely 2 to L lung atelectasis and PNA. MS change 2 to Depakote toxicity and ? uremia. Overnight 1/9 -> 1/20 pt desat'd and CXR revealed R UL atelectasis     Neuro: has not been on sedation. remains off sedation.  Resp: Cont aggressive pulm toilet. SBT today.  CVS: Taper Midodrine as tolerated  HEME: DVT prophylaxis with sq Heparin. transfuse 1 unit prbc. no active bleeding noted.  FEN/Renal: Cont enteral feeds/ Start IV hydration for increased BUN/Cr/ Follow BUN/Cr and UO  dispo: possible extubation?

## 2024-01-22 NOTE — PROGRESS NOTE ADULT - SUBJECTIVE AND OBJECTIVE BOX
INTERVAL HPI/OVERNIGHT EVENTS:   HPI:  Pt is a 68-year-old male with a PMHX of seizure disorder, dementia, hydrocephalus status post  shunt, previous traumatic brain injury, and wheelchair-bound at baseline is interactive presents to ED today brought in by home health aid for increased lethargy and decreased PO intake for the past week. Today pt become altered and unresponsive. EMS called upon arrival pt was hypoglycemic, hypotensive, and hypoxemic. Per home health aid pt has been having worsening difficulty with eating over the last couple of months and takes him a long time to swallow his pills. Per patient's mother, patient recently had Depakote dose decreased due to being more lethargic. Per ED pt with new bed sores on sacrum. H&P obtained by pts home health aid at bedside, pt nonresponsive to questions at this time.   In ED, pt hypoglycemic s/p 2 amps, hypoxemic on RA placed on NRB, and hypotensive (85/52) s/p 2L IVF with minimal improvement subsequently started on levophed.  S/p one dose vanc/zosyn. Labs remarkable for Glucose 39, Cr 3.08, , lactate 4.0, BNP 2942. VB.39/61/38/37.   Chest x-ray: Extensive left lung infiltrate and effusion with slight infiltrate at right base medially. Right ventricular shunt.  CTH noncon: Ventriculostomy noted in situ without evidence for hydrocephalus.  CT chest: Large consolidative/atelectatic changes involving the left lung. Small left pleural effusion. Complete opacification of the left lower lobe bronchus. Secretions within the left mainstem bronchus.   CT abd/pelvis: exophytic lesion at the mid to lower pole of the right kidney and additional bilateral renal cysts    Subjective:   Pt seen and examined at bedside. Pt awake and alert, laying comfortably in bed with NRB in place. Pt not answering to questions at this time, opening eyes to voice.      (2024 12:59)      CENTRAL LINE: [ ] YES [ ] NO  LOCATION:   DATE INSERTED:  REMOVE: [ ] YES [ ] NO  EXPLAIN:    PRADHAN: [ ] YES [ ] NO    DATE INSERTED:  REMOVE:  [ ] YES [ ] NO  EXPLAIN:    A-LINE:  [ ] YES [ ] NO  LOCATION:   DATE INSERTED:  REMOVE:  [ ] YES [ ] NO  EXPLAIN:    PAST MEDICAL & SURGICAL HISTORY:  Seizure      Dementia          REVIEW OF SYSTEMS:    Negative ROS aside from HPI/Interval events above.    ICU Vital Signs Last 24 Hrs  T(C): 37.4 (2024 11:00), Max: 38.3 (2024 18:00)  T(F): 99.3 (2024 11:00), Max: 100.9 (2024 18:00)  HR: 103 (2024 12:02) (93 - 106)  BP: --  BP(mean): --  ABP: 135/67 (2024 11:00) (90/49 - 140/68)  ABP(mean): 94 (2024 11:00) (65 - 104)  RR: 28 (2024 11:00) (10 - 28)  SpO2: 100% (2024 12:02) (96% - 100%)    O2 Parameters below as of 2024 05:59  Patient On (Oxygen Delivery Method): ventilator            ABG - ( 2024 09:11 )  pH, Arterial: 7.51  pH, Blood: x     /  pCO2: 48    /  pO2: 154   / HCO3: 38    / Base Excess: 13.8  /  SaO2: 99.7                I&O's Detail    2024 07:01  -  2024 07:00  --------------------------------------------------------  IN:    Free Water: 1000 mL    Lactated Ringers: 1500 mL    Nepro with Carb Steady: 1020 mL  Total IN: 3520 mL    OUT:    Indwelling Catheter - Urethral (mL): 1590 mL  Total OUT: 1590 mL    Total NET: 1930 mL      2024 07:01  -  2024 12:47  --------------------------------------------------------  IN:    Lactated Ringers: 225 mL    Nepro with Carb Steady: 180 mL  Total IN: 405 mL    OUT:    Indwelling Catheter - Urethral (mL): 385 mL  Total OUT: 385 mL    Total NET: 20 mL      Mode: CPAP with PS  FiO2: 50  PEEP: 5  PS: 10  MAP: 8  PIP: 15    CAPILLARY BLOOD GLUCOSE      POCT Blood Glucose.: 209 mg/dL (2024 11:28)  POCT Blood Glucose.: 225 mg/dL (2024 05:49)  POCT Blood Glucose.: 169 mg/dL (2024 23:28)  POCT Blood Glucose.: 169 mg/dL (2024 18:27)        PHYSICAL EXAM:    Gen: lying comfortably in bed in no apparent distress  Mouth: (+) ETT/ OGT  Lungs: CTA  Heart: Tachy  Abd: Soft/+BS/ Non-tender  Ext: Hand edema  Neuro: Open eyes to verbal stimuli      LABS:                        6.7    17.55 )-----------( 367      ( 2024 04:13 )             22.2          147<H>  |  105  |  145<H>  ----------------------------<  180<H>  3.5   |  37<H>  |  2.30<H>    Ca    8.4<L>      2024 02:26  Phos  4.6       Mg     3.0         TPro  5.6<L>  /  Alb  1.4<L>  /  TBili  0.3  /  DBili  x   /  AST  79<H>  /  ALT  21  /  AlkPhos  265<H>        Urinalysis Basic - ( 2024 02:26 )    Color: x / Appearance: x / SG: x / pH: x  Gluc: 180 mg/dL / Ketone: x  / Bili: x / Urobili: x   Blood: x / Protein: x / Nitrite: x   Leuk Esterase: x / RBC: x / WBC x   Sq Epi: x / Non Sq Epi: x / Bacteria: x

## 2024-01-22 NOTE — PROGRESS NOTE ADULT - SUBJECTIVE AND OBJECTIVE BOX
Neurology Progress Note    S: Patient seen and examined.      MEDICATIONS:    acetaminophen   Oral Liquid .. 650 milliGRAM(s) Oral every 6 hours PRN  albuterol/ipratropium for Nebulization 3 milliLiter(s) Nebulizer every 6 hours  aspirin  chewable 81 milliGRAM(s) Enteral Tube daily  chlorhexidine 2% Cloths 1 Application(s) Topical <User Schedule>  heparin   Injectable 5000 Unit(s) SubCutaneous every 8 hours  lacosamide Solution 150 milliGRAM(s) Oral two times a day  lactated ringers. 1000 milliLiter(s) IV Continuous <Continuous>  latanoprost 0.005% Ophthalmic Solution 1 Drop(s) Both EYES at bedtime  midodrine. 20 milliGRAM(s) Oral three times a day  polyethylene glycol 3350 17 Gram(s) Oral daily  sodium chloride 3%  Inhalation 4 milliLiter(s) Inhalation every 6 hours      LABS:                          6.7    17.55 )-----------( 367      ( 22 Jan 2024 04:13 )             22.2     01-22    147<H>  |  105  |  145<H>  ----------------------------<  180<H>  3.5   |  37<H>  |  2.30<H>    Ca    8.4<L>      22 Jan 2024 02:26  Phos  4.6     01-22  Mg     3.0     01-22    TPro  5.6<L>  /  Alb  1.4<L>  /  TBili  0.3  /  DBili  x   /  AST  79<H>  /  ALT  21  /  AlkPhos  265<H>  01-22    CAPILLARY BLOOD GLUCOSE      POCT Blood Glucose.: 225 mg/dL (22 Jan 2024 05:49)  POCT Blood Glucose.: 169 mg/dL (21 Jan 2024 23:28)  POCT Blood Glucose.: 169 mg/dL (21 Jan 2024 18:27)  POCT Blood Glucose.: 241 mg/dL (21 Jan 2024 11:08)      Urinalysis Basic - ( 22 Jan 2024 02:26 )    Color: x / Appearance: x / SG: x / pH: x  Gluc: 180 mg/dL / Ketone: x  / Bili: x / Urobili: x   Blood: x / Protein: x / Nitrite: x   Leuk Esterase: x / RBC: x / WBC x   Sq Epi: x / Non Sq Epi: x / Bacteria: x      I&O's Summary    21 Jan 2024 07:01  -  22 Jan 2024 07:00  --------------------------------------------------------  IN: 3520 mL / OUT: 1590 mL / NET: 1930 mL      Vital Signs Last 24 Hrs  T(C): 37.4 (22 Jan 2024 07:11), Max: 38.3 (21 Jan 2024 18:00)  T(F): 99.3 (22 Jan 2024 07:11), Max: 100.9 (21 Jan 2024 18:00)  HR: 99 (22 Jan 2024 07:11) (94 - 108)  BP: --  BP(mean): --  RR: 13 (22 Jan 2024 07:11) (10 - 21)  SpO2: 100% (22 Jan 2024 05:59) (96% - 100%)    Parameters below as of 22 Jan 2024 05:59  Patient On (Oxygen Delivery Method): ventilator        General Exam:   General Appearance:  intubated, not sedated  Head: Normocephalic, atraumatic and no dysmorphic features  Ear, Nose, and Throat: Moist mucous membranes  CVS: S1S2+  Resp: mechanical  GI: soft NT/ND  Extremities: No edema or cyanosis  Skin: No bruises or rashes     Neurological Exam:  Mental Status: eyes open tracks s.  Cranial Nerves: PERRL, no BTT, no facial palsy   Motor: trace spontaneous movement RUE  Sensation: minimal withdrawl to noxious in RUE  Reflexes: 1+ throughout at biceps, brachioradialis, triceps, patellars and ankles bilaterally and equal. No clonus. R toe and L toe were both downgoing.  Coordination: unable  Gait: unable      I personally reviewed the below data/images/labs:        < from: CT Head No Cont (01.14.24 @ 11:25) >  IMPRESSION:    1)  chronic ischemic changes with atrophy, most severe in the frontal   lobes. Severe atrophic changes of the anterior corpus callosum and   periventricular white matter..  2)  chronic dural thickening and plaque calcification noted bilaterally,   with no acute hemorrhagic collections suggested.  3. Ventriculostomy noted in situ without evidence for hydrocephalus.    < end of copied text >

## 2024-01-23 NOTE — PROGRESS NOTE ADULT - ASSESSMENT
68M w/dementia, seizure disorder, hydrocephalus s/p  shunt, prior TBI admitted for AMS and acute hypoxemic respiratory failure 2/2 PNA and pulm edema. Now with persistent hypotension due to septic shock. Neuroloy consulted for AED mgmt.   Poor mental status on exam, remains intubated but off sedation  VPA level 93 -> 87 -> free level 57.9. VPA level 1/21 4  VPA recently lowered by outpatient neurologist due to concerns for lethargy  Ammonia 35 -> 40  Leukocytosis improving  CT with chronic ischemic changes most prominent in bifrontal, prior ventriculostomy     AMS - likely multifactorial due to sepsis, VPA toxicity  Hyperammonemia - ?related to VPA vs other etioloy       Plan:  -  on Lamictal 150 mg BID- assess how patient responds  - routine EEG  - supportive care per ICU  - if mental status remains unchanged consider repeating imaging with MRI          68M w/dementia, seizure disorder, hydrocephalus s/p  shunt, prior TBI admitted for AMS and acute hypoxemic respiratory failure 2/2 PNA and pulm edema. Now with persistent hypotension due to septic shock. Neuroloy consulted for AED mgmt.   Poor mental status on exam, remains intubated but off sedation  VPA level 93 -> 87 -> free level 57.9. VPA level 1/21 4  VPA recently lowered by outpatient neurologist due to concerns for lethargy  Ammonia 35 -> 40  Leukocytosis improving  CT with chronic ischemic changes most prominent in bifrontal, prior ventriculostomy     AMS - likely multifactorial due to sepsis, VPA toxicity  Hyperammonemia - ?related to VPA vs other etioloy       Plan:  -  on lacosamide 150 mg BID- assess how patient responds  - routine EEG  - supportive care per ICU  - if mental status remains unchanged consider repeating imaging with MRI

## 2024-01-23 NOTE — PROGRESS NOTE ADULT - ASSESSMENT
Pt is a 67 yo BM with h/o TBI, hydrocephalus s/p  shunt, Sz d/o and dementia admitted for AMS and acute hypoxemic respiratory failure. Respiratory failure likely 2 to L lung atelectasis and PNA. MS change 2 to Depakote toxicity and ? uremia. Overnight 1/9 -> 1/20 pt desat'd and CXR revealed R UL atelectasis     Resp: Cont aggressive pulm toilet/ Daily SBT: weaning was marginal (may need to begin trach discussion)  CVS: Taper Midodrine as tolerated  HEME: DVT prophylaxis with sq Heparin  FEN/Renal: Cont enteral feeds/ Cont free H2O; follow BUN/Cr/Na and UO  Neuro: Neuro f/u prn  Social: Mother at the bedside

## 2024-01-23 NOTE — PROGRESS NOTE ADULT - SUBJECTIVE AND OBJECTIVE BOX
HPI:  Pt is a 68-year-old male with a PMHX of seizure disorder, dementia, hydrocephalus status post  shunt, previous traumatic brain injury, and wheelchair-bound at baseline is interactive presents to ED today brought in by home health aid for increased lethargy and decreased PO intake for the past week. Today pt become altered and unresponsive. EMS called upon arrival pt was hypoglycemic, hypotensive, and hypoxemic. Per home health aid pt has been having worsening difficulty with eating over the last couple of months and takes him a long time to swallow his pills. Per patient's mother, patient recently had Depakote dose decreased due to being more lethargic. Per ED pt with new bed sores on sacrum. H&P obtained by pts home health aid at bedside, pt nonresponsive to questions at this time.   In ED, pt hypoglycemic s/p 2 amps, hypoxemic on RA placed on NRB, and hypotensive (85/52) s/p 2L IVF with minimal improvement subsequently started on levophed.  S/p one dose vanc/zosyn. Labs remarkable for Glucose 39, Cr 3.08, , lactate 4.0, BNP 2942. VB.39/61/38/37.   Chest x-ray: Extensive left lung infiltrate and effusion with slight infiltrate at right base medially. Right ventricular shunt.  CTH noncon: Ventriculostomy noted in situ without evidence for hydrocephalus.  CT chest: Large consolidative/atelectatic changes involving the left lung. Small left pleural effusion. Complete opacification of the left lower lobe bronchus. Secretions within the left mainstem bronchus.   CT abd/pelvis: exophytic lesion at the mid to lower pole of the right kidney and additional bilateral renal cysts    Subjective:   Pt seen and examined at bedside. Pt awake and alert, laying comfortably in bed with NRB in place. Pt not answering to questions at this time, opening eyes to voice.      (2024 12:59)      24 hr events:      ## Labs:  CBC:                        8.0    18.10 )-----------( 554      ( 2024 02:18 )             26.1     Chem:  -    146<H>  |  105  |  133<H>  ----------------------------<  191<H>  3.5   |  34<H>  |  1.99<H>    Ca    8.4<L>      2024 02:18  Phos  4.0       Mg     2.8         TPro  5.9<L>  /  Alb  1.5<L>  /  TBili  0.2  /  DBili  x   /  AST  87<H>  /  ALT  23  /  AlkPhos  253<H>      Coags:          ## Imaging:    ## Medications:    midodrine. 20 milliGRAM(s) Oral three times a day    albuterol/ipratropium for Nebulization 3 milliLiter(s) Nebulizer every 6 hours      aspirin  chewable 81 milliGRAM(s) Enteral Tube daily  heparin   Injectable 5000 Unit(s) SubCutaneous every 8 hours    lactulose Syrup 10 Gram(s) Oral every 8 hours  polyethylene glycol 3350 17 Gram(s) Oral daily    acetaminophen   Oral Liquid .. 650 milliGRAM(s) Oral every 6 hours PRN  lacosamide Solution 150 milliGRAM(s) Oral two times a day      ## Vitals:  T(C): 37.9 (24 @ 23:00), Max: 38.2 (24 @ 14:00)  HR: 111 (24 @ 23:00) (91 - 111)  BP: --  BP(mean): --  RR: 18 (24 @ 23:00) (10 - 37)  SpO2: 100% (24 @ 23:00) (99% - 100%)  Wt(kg): --  Vent: Mode: CPAP with PS, RR (patient): 31, FiO2: 40, PEEP: 5, PS: 8, PIP: 14  ABG: ABG - ( 2024 09:11 )  pH, Arterial: 7.51  pH, Blood: x     /  pCO2: 48    /  pO2: 154   / HCO3: 38    / Base Excess: 13.8  /  SaO2: 99.7                   @ 07:01  -   @ 07:00  --------------------------------------------------------  IN: 2055 mL / OUT: 1795 mL / NET: 260 mL     @ 07:01   @ 23:42  --------------------------------------------------------  IN: 1240 mL / OUT: 1260 mL / NET: -20 mL          ## P/E:  Gen: lying comfortably in bed in no apparent distress  Mouth:   Neck:  Lungs:   Heart:   Abd:  Ext:  Neuro:    CENTRAL LINE: [ ] YES [ ] NO  LOCATION:   DATE INSERTED:  REMOVE: [ ] YES [ ] NO      CAROLYNN: [ ] YES [ ] NO    DATE INSERTED:  REMOVE:  [ ] YES [ ] NO      A-LINE:  [ ] YES [ ] NO  LOCATION:   DATE INSERTED:  REMOVE:  [ ] YES [ ] NO  EXPLAIN:    GLOBAL ISSUE/BEST PRACTICE:  Analgesia:  Sedation:  HOB elevation: yes  Stress ulcer prophylaxis:  VTE prophylaxis:  Oral Care:  Glycemic control:  Nutrition:    CODE STATUS: [ ] full code  [ ] DNR  [ ] DNI  [ ] MOLST  Goals of care discussion: [ ] yes  HPI:  Pt is a 69 yo BM with h/o TBI, hydrocephalus s/p  shunt, Sz d/o and dementia admitted for AMS and acute hypoxemic respiratory failure. Respiratory failure likely 2 to L lung atelectasis and PNA. MS change 2 to Depakote toxicity and ? uremia. Overnight 1/9 -> 1/20 pt desat'd and CXR revealed R UL atelectasis       ## Labs:  CBC:                        8.0    18.10 )-----------( 554      ( 23 Jan 2024 02:18 )             26.1     Chem:  01-23    146<H>  |  105  |  133<H>  ----------------------------<  191<H>  3.5   |  34<H>  |  1.99<H>    Ca    8.4<L>      23 Jan 2024 02:18  Phos  4.0     01-23  Mg     2.8     01-23    TPro  5.9<L>  /  Alb  1.5<L>  /  TBili  0.2  /  DBili  x   /  AST  87<H>  /  ALT  23  /  AlkPhos  253<H>  01-23    Coags:          ## Imaging:    ## Medications:    midodrine. 20 milliGRAM(s) Oral three times a day    albuterol/ipratropium for Nebulization 3 milliLiter(s) Nebulizer every 6 hours      aspirin  chewable 81 milliGRAM(s) Enteral Tube daily  heparin   Injectable 5000 Unit(s) SubCutaneous every 8 hours    lactulose Syrup 10 Gram(s) Oral every 8 hours  polyethylene glycol 3350 17 Gram(s) Oral daily    acetaminophen   Oral Liquid .. 650 milliGRAM(s) Oral every 6 hours PRN  lacosamide Solution 150 milliGRAM(s) Oral two times a day      ## Vitals:  T(C): 37.9 (01-23-24 @ 23:00), Max: 38.2 (01-23-24 @ 14:00)  HR: 111 (01-23-24 @ 23:00) (91 - 111)  BP: --  BP(mean): --  RR: 18 (01-23-24 @ 23:00) (10 - 37)  SpO2: 100% (01-23-24 @ 23:00) (99% - 100%)  Wt(kg): --  Vent: Mode: CPAP with PS, RR (patient): 31, FiO2: 40, PEEP: 5, PS: 8, PIP: 14  ABG: ABG - ( 22 Jan 2024 09:11 )  pH, Arterial: 7.51  pH, Blood: x     /  pCO2: 48    /  pO2: 154   / HCO3: 38    / Base Excess: 13.8  /  SaO2: 99.7                  01-22 @ 07:01 - 01-23 @ 07:00  --------------------------------------------------------  IN: 2055 mL / OUT: 1795 mL / NET: 260 mL    01-23 @ 07:01 - 01-23 @ 23:42  --------------------------------------------------------  IN: 1240 mL / OUT: 1260 mL / NET: -20 mL          ## P/E:  Gen: lying comfortably in bed in no apparent distress  Mouth: (+) ETT/ OGT  Lungs: CTA  Heart: Tachy  Abd: Soft/+BS/ Non-tender  Ext: UE edema  Neuro: (+) tracking    CENTRAL LINE: [ ] YES [ ] NO  LOCATION:   DATE INSERTED:  REMOVE: [ ] YES [ ] NO      PRADHAN: [ ] YES [ ] NO    DATE INSERTED:  REMOVE:  [ ] YES [ ] NO      A-LINE:  [ ] YES [ ] NO  LOCATION:   DATE INSERTED:  REMOVE:  [ ] YES [ ] NO  EXPLAIN:    CODE STATUS: [x ] full code  [ ] DNR  [ ] [facility]NI  [ ] [facility]  Goals of care discussion: [ ] yes

## 2024-01-23 NOTE — CHART NOTE - NSCHARTNOTEFT_GEN_A_CORE
Per chart pt with PMH seizure disorder, dementia, hydrocephalus s/p  shunt, TBI, present swith increased lethargy and poor pO intake x 1 week PTA, admitted with metabolic encephalopathy, septic shock secondary to PNA vs UTI, intubated. Pt remains intubated at time of visit; awake.    Factors impacting intake: [x] none [ ] nausea  [ ] vomiting [ ] diarrhea [ ] constipation  [ ]chewing problems [ ] swallowing issues  [ ] other:     Diet Prescription: Diet, NPO with Tube Feed:   Tube Feeding Modality: Orogastric  Nepro with Carb Steady  Total Volume for 24 Hours (mL): 1080  Continuous  Starting Tube Feed Rate {mL per Hour}: 30  Increase Tube Feed Rate by (mL): 10     Every 6 hours  Until Goal Tube Feed Rate (mL per Hour): 60  Tube Feed Duration (in Hours): 18  Tube Feed Start Time: 17:00 (24 @ 12:54)    Intake: tube feed held at time of visit due to ICU 18 hour protocol; received @ goal rate and tolerated as per flow sheets prior to that. Tube feed as ordered Nepro @ goal rate 60ml/hr x 18 hours which provides 1080ml total volume, 1944kcal, 87.5g protein, 785ml water; pt also receiving 250ml water flushes q 6 hours    Current Weight: () 72.8kg () 83.1kg indicating weight loss 10.3kg  % Weight Change: 12.4% weight loss x 8 days- most weight loss likely due to fluid losses    Edema: 1+ generalized; 3+ left arm and hand as per flow sheets    Pertinent Medications: MEDICATIONS  (STANDING):  albuterol/ipratropium for Nebulization 3 milliLiter(s) Nebulizer every 6 hours  aspirin  chewable 81 milliGRAM(s) Enteral Tube daily  chlorhexidine 2% Cloths 1 Application(s) Topical <User Schedule>  heparin   Injectable 5000 Unit(s) SubCutaneous every 8 hours  lacosamide Solution 150 milliGRAM(s) Oral two times a day  lactulose Syrup 10 Gram(s) Oral every 8 hours  latanoprost 0.005% Ophthalmic Solution 1 Drop(s) Both EYES at bedtime  midodrine. 20 milliGRAM(s) Oral three times a day  polyethylene glycol 3350 17 Gram(s) Oral daily  sodium chloride 3%  Inhalation 4 milliLiter(s) Inhalation every 6 hours    MEDICATIONS  (PRN):  acetaminophen   Oral Liquid .. 650 milliGRAM(s) Oral every 6 hours PRN Temp greater or equal to 38C (100.4F)    Pertinent Labs:  Na146 mmol/L<H> Glu 191 mg/dL<H> K+ 3.5 mmol/L Cr  1.99 mg/dL<H>  mg/dL<H>  Phos 4.0 mg/dL  Alb 1.5 g/dL<L>    24 @ 19:00  A1C 4.8    Skin: Pressure injuries as per flow sheets:  1. right ischial tuberosity- stage III  2. left gluteus- stage II  3. left ischial tuberosity- stage III  4. left lower leg x 2- stage II    Estimated Needs:   [x] no change since previous assessment for kcal needs:   [x] recalculated protein needs based on IBW 72.5k.2-1.4g/kg protein= .5g protein/day    Previous Nutrition Diagnosis:   [x] Inadequate Energy Intake  Etiology	inadequate tube feeding infusion  Signs/Symptoms	tube feeding not meeting needs at  goal rate  Goal/Expected Outcome	Pt to meet >75% needs as assessed above via tolerated route. (met)      Nutrition Diagnosis is [ ] ongoing  [x] resolved [ ] not applicable     New Nutrition Diagnosis: [x] Increased protein needs    Etiology	increased physiological demand for protein    Signs/Symptoms	multiple stage II or greater pressure injuries    Goal/Expected Outcome	Pt to meet >75% protein needs as via tolerated route      Interventions:   Recommend  [ ] Change Diet To:  [ ] Nutrition Supplement  [x] Nutrition Support: Continue diet as ordered above; water flushes as per ICU team  [ ] Other:     Monitoring and Evaluation:   [ ] PO intake [ x ] Tolerance to diet prescription [ x ] weights [ x ] labs[ x ] follow up per protocol  [ ] other:    RD remains available.

## 2024-01-23 NOTE — PROGRESS NOTE ADULT - SUBJECTIVE AND OBJECTIVE BOX
Neurology Progress Note    S: Patient seen and examined.    MEDICATIONS:    acetaminophen   Oral Liquid .. 650 milliGRAM(s) Oral every 6 hours PRN  albuterol/ipratropium for Nebulization 3 milliLiter(s) Nebulizer every 6 hours  aspirin  chewable 81 milliGRAM(s) Enteral Tube daily  chlorhexidine 2% Cloths 1 Application(s) Topical <User Schedule>  heparin   Injectable 5000 Unit(s) SubCutaneous every 8 hours  lacosamide Solution 150 milliGRAM(s) Oral two times a day  lactulose Syrup 10 Gram(s) Oral every 8 hours  latanoprost 0.005% Ophthalmic Solution 1 Drop(s) Both EYES at bedtime  midodrine. 20 milliGRAM(s) Oral three times a day  polyethylene glycol 3350 17 Gram(s) Oral daily  sodium chloride 3%  Inhalation 4 milliLiter(s) Inhalation every 6 hours      LABS:                          8.0    18.10 )-----------( 554      ( 23 Jan 2024 02:18 )             26.1     01-23    146<H>  |  105  |  133<H>  ----------------------------<  191<H>  3.5   |  34<H>  |  1.99<H>    Ca    8.4<L>      23 Jan 2024 02:18  Phos  4.0     01-23  Mg     2.8     01-23    TPro  5.9<L>  /  Alb  1.5<L>  /  TBili  0.2  /  DBili  x   /  AST  87<H>  /  ALT  23  /  AlkPhos  253<H>  01-23    CAPILLARY BLOOD GLUCOSE      POCT Blood Glucose.: 183 mg/dL (23 Jan 2024 05:16)  POCT Blood Glucose.: 189 mg/dL (22 Jan 2024 23:40)  POCT Blood Glucose.: 161 mg/dL (22 Jan 2024 17:44)  POCT Blood Glucose.: 209 mg/dL (22 Jan 2024 11:28)      Urinalysis Basic - ( 23 Jan 2024 02:18 )    Color: x / Appearance: x / SG: x / pH: x  Gluc: 191 mg/dL / Ketone: x  / Bili: x / Urobili: x   Blood: x / Protein: x / Nitrite: x   Leuk Esterase: x / RBC: x / WBC x   Sq Epi: x / Non Sq Epi: x / Bacteria: x      I&O's Summary    21 Jan 2024 07:01  -  22 Jan 2024 07:00  --------------------------------------------------------  IN: 3520 mL / OUT: 1590 mL / NET: 1930 mL    22 Jan 2024 07:01  -  23 Jan 2024 06:37  --------------------------------------------------------  IN: 1995 mL / OUT: 1795 mL / NET: 200 mL      Vital Signs Last 24 Hrs  T(C): 37.8 (23 Jan 2024 06:00), Max: 37.8 (23 Jan 2024 06:00)  T(F): 100 (23 Jan 2024 06:00), Max: 100 (23 Jan 2024 06:00)  HR: 105 (23 Jan 2024 06:00) (92 - 110)  BP: --  BP(mean): --  RR: 11 (23 Jan 2024 06:00) (10 - 31)  SpO2: 100% (23 Jan 2024 06:00) (99% - 100%)    Parameters below as of 23 Jan 2024 01:28  Patient On (Oxygen Delivery Method): ventilator        General Exam:   General Appearance:  intubated, not sedated  Head: Normocephalic, atraumatic and no dysmorphic features  Ear, Nose, and Throat: Moist mucous membranes  CVS: S1S2+  Resp: mechanical  GI: soft NT/ND  Extremities: No edema or cyanosis  Skin: No bruises or rashes     Neurological Exam:  Mental Status: eyes open tracks s.  Cranial Nerves: PERRL, no BTT, no facial palsy   Motor: trace spontaneous movement RUE  Sensation: minimal withdrawl to noxious in RUE  Reflexes: 1+ throughout at biceps, brachioradialis, triceps, patellars and ankles bilaterally and equal. No clonus. R toe and L toe were both downgoing.  Coordination: unable  Gait: unable      I personally reviewed the below data/images/labs:        < from: CT Head No Cont (01.14.24 @ 11:25) >  IMPRESSION:    1)  chronic ischemic changes with atrophy, most severe in the frontal   lobes. Severe atrophic changes of the anterior corpus callosum and   periventricular white matter..  2)  chronic dural thickening and plaque calcification noted bilaterally,   with no acute hemorrhagic collections suggested.  3. Ventriculostomy noted in situ without evidence for hydrocephalus.    < end of copied text >

## 2024-01-24 NOTE — PROGRESS NOTE ADULT - SUBJECTIVE AND OBJECTIVE BOX
INTERVAL HPI/OVERNIGHT EVENTS:   HPI:  Pt is a 68-year-old male with a PMHX of seizure disorder, dementia, hydrocephalus status post  shunt, previous traumatic brain injury, and wheelchair-bound at baseline is interactive presents to ED today brought in by home health aid for increased lethargy and decreased PO intake for the past week. Today pt become altered and unresponsive. EMS called upon arrival pt was hypoglycemic, hypotensive, and hypoxemic. Per home health aid pt has been having worsening difficulty with eating over the last couple of months and takes him a long time to swallow his pills. Per patient's mother, patient recently had Depakote dose decreased due to being more lethargic. Per ED pt with new bed sores on sacrum. H&P obtained by pts home health aid at bedside, pt nonresponsive to questions at this time.   In ED, pt hypoglycemic s/p 2 amps, hypoxemic on RA placed on NRB, and hypotensive (85/52) s/p 2L IVF with minimal improvement subsequently started on levophed.  S/p one dose vanc/zosyn. Labs remarkable for Glucose 39, Cr 3.08, , lactate 4.0, BNP 2942. VB.39/61/38/37.   Chest x-ray: Extensive left lung infiltrate and effusion with slight infiltrate at right base medially. Right ventricular shunt.  CTH noncon: Ventriculostomy noted in situ without evidence for hydrocephalus.  CT chest: Large consolidative/atelectatic changes involving the left lung. Small left pleural effusion. Complete opacification of the left lower lobe bronchus. Secretions within the left mainstem bronchus.   CT abd/pelvis: exophytic lesion at the mid to lower pole of the right kidney and additional bilateral renal cysts    Subjective:   Pt seen and examined at bedside. Pt awake and alert, laying comfortably in bed with NRB in place. Pt not answering to questions at this time, opening eyes to voice.      (2024 12:59)      CENTRAL LINE: [ ] YES [ ] NO  LOCATION:   DATE INSERTED:  REMOVE: [ ] YES [ ] NO  EXPLAIN:    PRADHAN: [ ] YES [ ] NO    DATE INSERTED:  REMOVE:  [ ] YES [ ] NO  EXPLAIN:    A-LINE:  [ ] YES [ ] NO  LOCATION:   DATE INSERTED:  REMOVE:  [ ] YES [ ] NO  EXPLAIN:    PAST MEDICAL & SURGICAL HISTORY:  Seizure      Dementia          REVIEW OF SYSTEMS:    Negative ROS aside from HPI/Interval events above.    ICU Vital Signs Last 24 Hrs  T(C): 37.8 (2024 11:00), Max: 38.2 (2024 14:00)  T(F): 100 (2024 11:00), Max: 100.8 (2024 14:00)  HR: 104 (2024 12:54) (88 - 111)  BP: --  BP(mean): --  ABP: 117/56 (2024 11:00) (93/48 - 153/81)  ABP(mean): 81 (2024 11:00) (63 - 113)  RR: 25 (2024 11:00) (10 - 37)  SpO2: 100% (2024 12:54) (100% - 100%)    O2 Parameters below as of 2024 00:06  Patient On (Oxygen Delivery Method): ventilator                I&O's Detail    2024 07:01  -  2024 07:00  --------------------------------------------------------  IN:    Enteral Tube Flush: 260 mL    Free Water: 1000 mL    IV PiggyBack: 550 mL    Lactated Ringers Bolus: 500 mL    Nepro with Carb Steady: 1080 mL  Total IN: 3390 mL    OUT:    Indwelling Catheter - Urethral (mL): 1885 mL  Total OUT: 1885 mL    Total NET: 1505 mL      2024 07:01  -  2024 13:43  --------------------------------------------------------  IN:    Free Water: 250 mL    Nepro with Carb Steady: 180 mL  Total IN: 430 mL    OUT:    Indwelling Catheter - Urethral (mL): 360 mL  Total OUT: 360 mL    Total NET: 70 mL          Mode: CPAP with PS  FiO2: 40  PEEP: 5  ITime: 1  MAP: 7  PIP: 13    CAPILLARY BLOOD GLUCOSE      PHYSICAL EXAM:    Gen: lying comfortably in bed in no apparent distress  Mouth: (+) ETT/ OGT  Lungs: CTA  Heart: Tachy  Abd: Soft/+BS/ Non-tender  Ext: UE edema  Neuro: (+) tracking      LABS:                        7.3    19.78 )-----------( 738      ( 2024 03:45 )             23.5      -24    146<H>  |  106  |  115<H>  ----------------------------<  189<H>  3.3<L>   |  32<H>  |  1.70<H>    Ca    8.4<L>      2024 03:45  Phos  3.7       Mg     2.6         TPro  5.4<L>  /  Alb  1.4<L>  /  TBili  0.2  /  DBili  x   /  AST  65<H>  /  ALT  24  /  AlkPhos  245<H>        Urinalysis Basic - ( 2024 03:45 )    Color: x / Appearance: x / SG: x / pH: x  Gluc: 189 mg/dL / Ketone: x  / Bili: x / Urobili: x   Blood: x / Protein: x / Nitrite: x   Leuk Esterase: x / RBC: x / WBC x   Sq Epi: x / Non Sq Epi: x / Bacteria: x

## 2024-01-24 NOTE — PROGRESS NOTE ADULT - SUBJECTIVE AND OBJECTIVE BOX
Neurology Progress Note    S: Patient seen and examined.    MEDICATIONS:    acetaminophen   Oral Liquid .. 650 milliGRAM(s) Oral every 6 hours PRN  albuterol/ipratropium for Nebulization 3 milliLiter(s) Nebulizer every 6 hours  aspirin  chewable 81 milliGRAM(s) Enteral Tube daily  chlorhexidine 2% Cloths 1 Application(s) Topical <User Schedule>  heparin   Injectable 5000 Unit(s) SubCutaneous every 8 hours  lacosamide Solution 150 milliGRAM(s) Oral two times a day  lactulose Syrup 10 Gram(s) Oral every 8 hours  latanoprost 0.005% Ophthalmic Solution 1 Drop(s) Both EYES at bedtime  midodrine. 20 milliGRAM(s) Oral three times a day  polyethylene glycol 3350 17 Gram(s) Oral daily      LABS:                          7.3    19.78 )-----------( 738      ( 24 Jan 2024 03:45 )             23.5     01-24    146<H>  |  106  |  115<H>  ----------------------------<  189<H>  3.3<L>   |  32<H>  |  1.70<H>    Ca    8.4<L>      24 Jan 2024 03:45  Phos  3.7     01-24  Mg     2.6     01-24    TPro  5.4<L>  /  Alb  1.4<L>  /  TBili  0.2  /  DBili  x   /  AST  65<H>  /  ALT  24  /  AlkPhos  245<H>  01-24    CAPILLARY BLOOD GLUCOSE          Urinalysis Basic - ( 24 Jan 2024 03:45 )    Color: x / Appearance: x / SG: x / pH: x  Gluc: 189 mg/dL / Ketone: x  / Bili: x / Urobili: x   Blood: x / Protein: x / Nitrite: x   Leuk Esterase: x / RBC: x / WBC x   Sq Epi: x / Non Sq Epi: x / Bacteria: x      I&O's Summary    23 Jan 2024 07:01  -  24 Jan 2024 07:00  --------------------------------------------------------  IN: 3390 mL / OUT: 1825 mL / NET: 1565 mL      Vital Signs Last 24 Hrs  T(C): 37.3 (24 Jan 2024 07:15), Max: 38.2 (23 Jan 2024 14:00)  T(F): 99.1 (24 Jan 2024 07:15), Max: 100.8 (23 Jan 2024 14:00)  HR: 100 (24 Jan 2024 07:15) (88 - 111)  BP: --  BP(mean): --  RR: 17 (24 Jan 2024 07:15) (10 - 37)  SpO2: 100% (24 Jan 2024 07:15) (100% - 100%)    Parameters below as of 24 Jan 2024 00:06  Patient On (Oxygen Delivery Method): ventilator          General Exam:   General Appearance:  intubated, not sedated  Head: Normocephalic, atraumatic and no dysmorphic features  Ear, Nose, and Throat: Moist mucous membranes  CVS: S1S2+  Resp: mechanical  GI: soft NT/ND  Extremities: No edema or cyanosis  Skin: No bruises or rashes     Neurological Exam:  Mental Status: eyes open tracks s.  Cranial Nerves: PERRL, no BTT, no facial palsy   Motor: trace spontaneous movement RUE  Sensation: minimal withdrawl to noxious in RUE  Reflexes: 1+ throughout at biceps, brachioradialis, triceps, patellars and ankles bilaterally and equal. No clonus. R toe and L toe were both downgoing.  Coordination: unable  Gait: unable      I personally reviewed the below data/images/labs:        < from: CT Head No Cont (01.14.24 @ 11:25) >  IMPRESSION:    1)  chronic ischemic changes with atrophy, most severe in the frontal   lobes. Severe atrophic changes of the anterior corpus callosum and   periventricular white matter..  2)  chronic dural thickening and plaque calcification noted bilaterally,   with no acute hemorrhagic collections suggested.  3. Ventriculostomy noted in situ without evidence for hydrocephalus.    < end of copied text >

## 2024-01-24 NOTE — PROGRESS NOTE ADULT - ASSESSMENT
68M w/dementia, seizure disorder, hydrocephalus s/p  shunt, prior TBI admitted for AMS and acute hypoxemic respiratory failure 2/2 PNA and pulm edema. Now with persistent hypotension due to septic shock. Neuroloy consulted for AED mgmt.   Poor mental status on exam, remains intubated but off sedation  VPA level 93 -> 87 -> free level 57.9. VPA level 1/21 4  VPA recently lowered by outpatient neurologist due to concerns for lethargy  Ammonia 35 -> 40  Leukocytosis improving  CT with chronic ischemic changes most prominent in bifrontal, prior ventriculostomy     AMS - likely multifactorial due to sepsis, VPA toxicity  Hyperammonemia - ?related to VPA vs other etioloy       Plan:  -  on Lamictal 150 mg BID- assess how patient responds  - routine EEG no seizures  - supportive care per ICU  - when able consider MRI         68M w/dementia, seizure disorder, hydrocephalus s/p  shunt, prior TBI admitted for AMS and acute hypoxemic respiratory failure 2/2 PNA and pulm edema. Now with persistent hypotension due to septic shock. Neuroloy consulted for AED mgmt.   Poor mental status on exam, remains intubated but off sedation  VPA level 93 -> 87 -> free level 57.9. VPA level 1/21 4  VPA recently lowered by outpatient neurologist due to concerns for lethargy  Ammonia 35 -> 40  Leukocytosis improving  CT with chronic ischemic changes most prominent in bifrontal, prior ventriculostomy     AMS - likely multifactorial due to sepsis, VPA toxicity  Hyperammonemia - ?related to VPA vs other etioloy       Plan:  -  on Vimpat 150 mg BID- assess how patient responds  - routine EEG no seizures  - supportive care per ICU  - when able consider MRI

## 2024-01-24 NOTE — PROGRESS NOTE ADULT - ASSESSMENT
69 yo BM with h/o TBI, hydrocephalus s/p  shunt, Sz d/o and dementia admitted for AMS and acute hypoxemic respiratory failure. Respiratory failure likely 2 to L lung atelectasis and PNA. MS change 2 to Depakote toxicity and ? uremia. Overnight 1/9 -> 1/20 pt desat'd and CXR revealed R UL atelectasis     Neuro: has not been on sedation. remains off sedation.  Resp: Cont aggressive pulm toilet. SBT today.  CVS: Taper Midodrine as tolerated  HEME: DVT prophylaxis with sq Heparin. transfuse 1 unit prbc. no active bleeding noted.  FEN/Renal: Cont enteral feeds/ Start IV hydration for increased BUN/Cr/ Follow BUN/Cr and UO  dispo: unlikely to wean. Trach referral/evaluation.

## 2024-01-25 NOTE — CONSULT NOTE ADULT - PROBLEM SELECTOR RECOMMENDATION 4
hx hydrocephalus s/p  shunt c/b recurrent infection/complications, has 24h HHA, dependent for care. Freq positioning

## 2024-01-25 NOTE — CONSULT NOTE ADULT - PROBLEM SELECTOR RECOMMENDATION 6
GOC discussion as above. Mother is legal guardian, she will discuss trach/PEG vs palliative extubation w patient's daughter. Palliative will follow.

## 2024-01-25 NOTE — GOALS OF CARE CONVERSATION - ADVANCED CARE PLANNING - CONVERSATION DETAILS
Spoke with patients motherAle at bedside in regards to her sons overall prognosis. Explained that her son has now been off sedation over the last couple of days with minimal improvement in his mental status. Also discussed that her son has now had multiple bronchoscopies due to mucus plugging with persistent high secretion burden and would be a high risk for reintubation if extubated. I discussed different options: tracheostomy vs palliative extubation with comfort. Pts mom has expressed that her son would not want to be on a ventilator for the rest of his life and in a facility but would like to discuss it with her daughter in the next couple of days before coming to a decision. She would like to continue to see if his mental status improves and follow up ct head.

## 2024-01-25 NOTE — CONSULT NOTE ADULT - CONVERSATION DETAILS
Met with mother lAe Calles at bedside who is patient's legal guardian, she will bring in copy for chart. Patient is , has a daughter Tia who the mother has been conferring with as well. She indicated that the patient has been incapacitated since 1998, diagnosed w hydrocephalus requiring  shunt. Subsequently he had multiple complications/infections with cognitive decline, somewhat interactive but dependent for care, WCbound. He lives in an apt w [address] [place] [address]. Over the last few months, his speech has declined, becoming more lethargic, felt possibly due to lacosamide which was discontinued by his neurologist and continued on depakote. he began w more difficulty swallowing 1 wk prior to admission. Currently patient remains w poor mental status off sedation, intubated, under ICU care. ICU team has discussed w mother regarding treatment options if mental status does not improve and unable to wean off ventilator. Readdressed options for trach/PEG/vent facility vs palliative extubation and allowing for natural death if he cannot breathe on his own. She indicated that she and his daughter will discuss, however, does not feel that he would want to be kept alive on machines or be sent to a facility. She feels that he has been through too much in these past 22 yrs and would prefer he die naturally, she will let the team know their decision after they speak. Support provided.

## 2024-01-25 NOTE — CONSULT NOTE ADULT - PROBLEM SELECTOR RECOMMENDATION 9
likely multifactorial in setting of sepsis, VPA toxicity (free VPA level 57), CT head no acute changes, EEG no epileptiform activity, neuro following. Off depakote, resumed on lacosamide as tolerated per neuro.

## 2024-01-25 NOTE — CONSULT NOTE ADULT - PROBLEM SELECTOR RECOMMENDATION 5
Clinical evidence indicates that the patient has Severe protein calorie malnutrition/ 3rd degree    In context of     Chronic Illness (>1 month)    Energy/Food intake <50% of estimated energy requirement >5 days  Weight loss: Moderate - severe (lbs lost recently)  Body Fat loss: Severe   (Cachexia, temporal wasting,  muscle atrophy)  Muscle mass loss: Severe  (Skin failure/pressure ulcers)  Fluid Accumulation: Severe ( pleural effusions)   Strength: weakened severe (bedbound)    Recommend:   alb 1.5 - nutrition consult    trial of NG tube feeds    local wound care, off loading

## 2024-01-25 NOTE — CONSULT NOTE ADULT - SUBJECTIVE AND OBJECTIVE BOX
Neurology Consult    Reason for Consult: Patient is a 68y old  Male who presents with a chief complaint of Septic shock 2/2 pneumonia (2024 12:24)      HPI:  Pt is a 68-year-old male with a PMHX of seizure disorder, dementia, hydrocephalus status post  shunt, previous traumatic brain injury, and wheelchair-bound at baseline is interactive presents to ED today brought in by home health aid for increased lethargy and decreased PO intake for the past week. Today pt become altered and unresponsive. EMS called upon arrival pt was hypoglycemic, hypotensive, and hypoxemic. Per home health aid pt has been having worsening difficulty with eating over the last couple of months and takes him a long time to swallow his pills. Per patient's mother, patient recently had Depakote dose decreased due to being more lethargic. Per ED pt with new bed sores on sacrum. H&P obtained by pts home health aid at bedside, pt nonresponsive to questions at this time.   In ED, pt hypoglycemic s/p 2 amps, hypoxemic on RA placed on NRB, and hypotensive (85/52) s/p 2L IVF with minimal improvement subsequently started on levophed.  S/p one dose vanc/zosyn. Labs remarkable for Glucose 39, Cr 3.08, , lactate 4.0, BNP 2942. VB.39/61/38/37.   Chest x-ray: Extensive left lung infiltrate and effusion with slight infiltrate at right base medially. Right ventricular shunt.  CTH noncon: Ventriculostomy noted in situ without evidence for hydrocephalus.  CT chest: Large consolidative/atelectatic changes involving the left lung. Small left pleural effusion. Complete opacification of the left lower lobe bronchus. Secretions within the left mainstem bronchus.   CT abd/pelvis: exophytic lesion at the mid to lower pole of the right kidney and additional bilateral renal cysts    Subjective:   Pt seen and examined at bedside. Pt awake and alert, laying comfortably in bed with NRB in place. Pt not answering to questions at this time, opening eyes to voice.      (2024 12:59)       PAST MEDICAL & SURGICAL HISTORY:  Seizure      Dementia          Allergies: Allergies    No Known Drug Allergies  shellfish (Anaphylaxis)    Intolerances        Social History: Denies toxic habits including tobacco, ETOH or illicit drugs.    Family History: FAMILY HISTORY:  . No family history of strokes    Medications: MEDICATIONS  (STANDING):  albuterol/ipratropium for Nebulization 3 milliLiter(s) Nebulizer every 6 hours  aspirin  chewable 81 milliGRAM(s) Enteral Tube daily  buMETAnide Infusion 4 mG/Hr (20 mL/Hr) IV Continuous <Continuous>  chlorhexidine 0.12% Liquid 15 milliLiter(s) Oral Mucosa every 12 hours  chlorhexidine 2% Cloths 1 Application(s) Topical <User Schedule>  heparin   Injectable 5000 Unit(s) SubCutaneous every 8 hours  latanoprost 0.005% Ophthalmic Solution 1 Drop(s) Both EYES at bedtime  norepinephrine Infusion 0.05 MICROgram(s)/kG/Min (3.9 mL/Hr) IV Continuous <Continuous>  polyethylene glycol 3350 17 Gram(s) Oral daily  potassium chloride   Powder 40 milliEquivalent(s) Oral once  potassium chloride  10 mEq/100 mL IVPB 10 milliEquivalent(s) IV Intermittent once  sodium chloride 3%  Inhalation 4 milliLiter(s) Inhalation every 6 hours  vasopressin Infusion 0.04 Unit(s)/Min (6 mL/Hr) IV Continuous <Continuous>    MEDICATIONS  (PRN):  acetaminophen   Oral Liquid .. 650 milliGRAM(s) Oral every 6 hours PRN Temp greater or equal to 38C (100.4F)  sodium chloride 0.9% lock flush 10 milliLiter(s) IV Push every 1 hour PRN Pre/post blood products, medications, blood draw, and to maintain line patency      Review of Systems:  CONSTITUTIONAL:  No weight loss, fever, chills, weakness or fatigue.  HEENT:  Eyes:  No visual loss, blurred vision, double vision or yellow sclera. Ears, Nose, Throat:  No hearing loss, sneezing, congestion, runny nose or sore throat.  SKIN:  No rash or itching.  CARDIOVASCULAR:  No chest pain, chest pressure or chest discomfort. No palpitations or edema.  RESPIRATORY:  No shortness of breath, cough or sputum.  GASTROINTESTINAL:  No anorexia, nausea, vomiting or diarrhea. No abdominal pain or blood.  GENITOURINARY:  No burning on urination or incontinence   NEUROLOGICAL:  No headache, dizziness, syncope, paralysis, ataxia, numbness or tingling in the extremities. No change in bowel or bladder control. no limb weakness. no vision changes.   MUSCULOSKELETAL:  No muscle, back pain, joint pain or stiffness.  HEMATOLOGIC:  No anemia, bleeding or bruising.  LYMPHATICS:  No enlarged nodes. No history of splenectomy.  PSYCHIATRIC:  No history of depression or anxiety.  ENDOCRINOLOGIC:  No reports of sweating, cold or heat intolerance. No polyuria or polydipsia.      Vitals:  Vital Signs Last 24 Hrs  T(C): 37.2 (2024 11:00), Max: 38.5 (2024 00:00)  T(F): 99 (2024 11:00), Max: 101.3 (2024 00:00)  HR: 93 (2024 11:35) (69 - 102)  BP: --  BP(mean): --  RR: 13 (2024 11:00) (11 - 21)  SpO2: 100% (2024 11:35) (94% - 100%)    Parameters below as of 2024 07:12  Patient On (Oxygen Delivery Method): ventilator, ETT: , PEEP5,RR10, FIO2 40%    O2 Concentration (%): 40    General Exam:   General Appearance:  intubated, not sedated  Head: Normocephalic, atraumatic and no dysmorphic features  Ear, Nose, and Throat: Moist mucous membranes  CVS: S1S2+  Resp: mechanical  GI: soft NT/ND  Extremities: No edema or cyanosis  Skin: No bruises or rashes     Neurological Exam:  Mental Status: eyes closed, does not respond to verbal or noxious stimuli. Does not follow commands.  Cranial Nerves: PERRL, no BTT, no facial palsy   Motor: trace spontaneous movement of RUE noted , inc tone in LE  Sensation: minimal withdrawl to noxious in RUE  Reflexes: 1+ throughout at biceps, brachioradialis, triceps, patellars and ankles bilaterally and equal. No clonus. R toe and L toe were both downgoing.  Coordination: unable  Gait: unable    Data/Labs/Imaging which I personally reviewed.     Labs:     CBC Full  -  ( 2024 02:15 )  WBC Count : 24.69 K/uL  RBC Count : 2.63 M/uL  Hemoglobin : 8.2 g/dL  Hematocrit : 25.4 %  Platelet Count - Automated : 77 K/uL  Mean Cell Volume : 96.6 fl  Mean Cell Hemoglobin : 31.2 pg  Mean Cell Hemoglobin Concentration : 32.3 g/dL  Auto Neutrophil # : x  Auto Lymphocyte # : x  Auto Monocyte # : x  Auto Eosinophil # : x  Auto Basophil # : x  Auto Neutrophil % : x  Auto Lymphocyte % : x  Auto Monocyte % : x  Auto Eosinophil % : x  Auto Basophil % : x        143  |  99  |  104<H>  ----------------------------<  141<H>  3.1<L>   |  38<H>  |  2.18<H>    Ca    8.7      2024 13:50  Phos  5.6       Mg     3.1         TPro  5.9<L>  /  Alb  1.2<L>  /  TBili  0.3  /  DBili  x   /  AST  65<H>  /  ALT  14  /  AlkPhos  235<H>      LIVER FUNCTIONS - ( 2024 02:15 )  Alb: 1.2 g/dL / Pro: 5.9 gm/dL / ALK PHOS: 235 U/L / ALT: 14 U/L / AST: 65 U/L / GGT: x             Urinalysis Basic - ( 2024 13:50 )    Color: x / Appearance: x / SG: x / pH: x  Gluc: 141 mg/dL / Ketone: x  / Bili: x / Urobili: x   Blood: x / Protein: x / Nitrite: x   Leuk Esterase: x / RBC: x / WBC x   Sq Epi: x / Non Sq Epi: x / Bacteria: x        < from: CT Head No Cont (24 @ 11:25) >  IMPRESSION:    1)  chronic ischemic changes with atrophy, most severe in the frontal   lobes. Severe atrophic changes of the anterior corpus callosum and   periventricular white matter..  2)  chronic dural thickening and plaque calcification noted bilaterally,   with no acute hemorrhagic collections suggested.  3. Ventriculostomy noted in situ without evidence for hydrocephalus.    < end of copied text >    
Consult to: Discuss complex medical decision making related to goals of care    [facility] Palliative Care Consult Service:   MD Evelyn Grayson, TWAN    May contact any member of Palliative Care team via [employer] Teams for consults or questions       HPI:  Pt is a 68-year-old male with a PMHX of seizure disorder, dementia, hydrocephalus status post  shunt, previous traumatic brain injury, and wheelchair-bound at baseline is interactive presents to ED today brought in by home health aid for increased lethargy and decreased PO intake for the past week. Today pt become altered and unresponsive. EMS called upon arrival pt was hypoglycemic, hypotensive, and hypoxemic. Per home health aid pt has been having worsening difficulty with eating over the last couple of months and takes him a long time to swallow his pills. Per patient's mother, patient recently had Depakote dose decreased due to being more lethargic. Per ED pt with new bed sores on sacrum. H&P obtained by pts home health aid at bedside, pt nonresponsive to questions at this time.   In ED, pt hypoglycemic s/p 2 amps, hypoxemic on RA placed on NRB, and hypotensive (85/52) s/p 2L IVF with minimal improvement subsequently started on levophed.  S/p one dose vanc/zosyn. Labs remarkable for Glucose 39, Cr 3.08, , lactate 4.0, BNP 2942. VB.39/61/38/37.   Chest x-ray: Extensive left lung infiltrate and effusion with slight infiltrate at right base medially. Right ventricular shunt.  CTH noncon: Ventriculostomy noted in situ without evidence for hydrocephalus.  CT chest: Large consolidative/atelectatic changes involving the left lung. Small left pleural effusion. Complete opacification of the left lower lobe bronchus. Secretions within the left mainstem bronchus.   CT abd/pelvis: exophytic lesion at the mid to lower pole of the right kidney and additional bilateral renal cysts    Subjective:   Pt seen and examined at bedside. Pt awake and alert, laying comfortably in bed with NRB in place. Pt not answering to questions at this time, opening eyes to voice.      (2024 12:59)    Interval history: under ICU care, remains intubated, off sedation, opens eyes, not following commands      PAST MEDICAL & SURGICAL HISTORY:  Seizure      Dementia          FAMILY HISTORY:   unable to obtain from patient due to poor mentation, family unable to give information, see H&P for history      SOCIAL HISTORY: , has daughter, mother  Admitted from:  home  (with HHA)       Yarsanism/spirituality:   [ none ] Substance abuse, [ none ] Tobacco hx, [ none ] Alcohol hx  [ none ] Home Opioid use      Baseline ADLs (prior to admission): somewhat interactive, dependent for care    Review of systems:        PAIN AD Score: 0    [email or web address] (press ctrl +  left click to view)  CPOT:    [email or web address]://[email or web address]/Critical-Care-[email or web address]-Observation-Tool-(CPOT)       Unable to obtain/Limited due to poor mental status    Allergies    No Known Drug Allergies  shellfish (Anaphylaxis)    Intolerances           MEDICATIONS  (STANDING):  albuterol/ipratropium for Nebulization 3 milliLiter(s) Nebulizer every 6 hours  aspirin  chewable 81 milliGRAM(s) Enteral Tube daily  chlorhexidine 0.12% Liquid 15 milliLiter(s) Oral Mucosa two times a day  chlorhexidine 2% Cloths 1 Application(s) Topical <User Schedule>  heparin   Injectable 5000 Unit(s) SubCutaneous every 8 hours  lacosamide Solution 150 milliGRAM(s) Oral two times a day  lactulose Syrup 10 Gram(s) Oral every 8 hours  latanoprost 0.005% Ophthalmic Solution 1 Drop(s) Both EYES at bedtime  midodrine. 20 milliGRAM(s) Oral three times a day  polyethylene glycol 3350 17 Gram(s) Oral daily  sodium chloride 3%  Inhalation 4 milliLiter(s) Inhalation every 6 hours    MEDICATIONS  (PRN):  acetaminophen   Oral Liquid .. 650 milliGRAM(s) Oral every 6 hours PRN Temp greater or equal to 38C (100.4F)      PHYSICAL EXAM:  Vital Signs Last 24 Hrs  T(C): 37.5 (2024 13:00), Max: 38 (2024 14:45)  T(F): 99.5 (2024 13:00), Max: 100.4 (2024 14:45)  HR: 104 (2024 13:31) (84 - 116)  BP: --  BP(mean): --  RR: 29 (2024 13:00) (11 - 32)  SpO2: 100% (2024 13:31) (98% - 100%)    Parameters below as of 2024 12:37  Patient On (Oxygen Delivery Method): ventilator         Palliative Performance Scale/Karnofsky Score: 20     GENERAL: alert, opens eyes, NAD  HEENT: Atraumatic, oropharynx clear, neck supple, intubated  CHEST/LUNG: unlabored  HEART: Regular rate and rhythm    ABDOMEN: Soft, Nontender, Nondistended   MUSCULOSKELETAL:  No  edema,  bedbound  NERVOUS SYSTEM:  awake, opens eyes, not following commands  SKIN: multiple decubiti noted  Oral intake: npo/TF     LABS:                        7.4    21.86 )-----------( 1032     ( 2024 03:07 )             25.0     01-25    148<H>  |  109<H>  |  104<H>  ----------------------------<  142<H>  3.6   |  34<H>  |  1.50<H>    Ca    9.1      2024 03:07  Phos  4.0       Mg     2.7         TPro  5.9<L>  /  Alb  1.5<L>  /  TBili  0.3  /  DBili  x   /  AST  50<H>  /  ALT  21  /  AlkPhos  226<H>  25    Urinalysis Basic - ( 2024 03:07 )    Color: x / Appearance: x / SG: x / pH: x  Gluc: 142 mg/dL / Ketone: x  / Bili: x / Urobili: x   Blood: x / Protein: x / Nitrite: x   Leuk Esterase: x / RBC: x / WBC x   Sq Epi: x / Non Sq Epi: x / Bacteria: x        RADIOLOGY & ADDITIONAL STUDIES:  < from: CT Head No Cont (24 @ 14:22) >  IMPRESSION: No acute intracranial findings.    Redemonstration of a right-sided ventriculoperitoneal shunt catheter   without change in size of the ventricular system.    Other multiple chronic findings, as discussed, which appear unchanged.    --- End of Report ---            CAS LU MD; Attending Radiologist  This document has been electronically signed. 2024  2:25PM    < end of copied text >

## 2024-01-25 NOTE — PROGRESS NOTE ADULT - ASSESSMENT
69 yo BM with h/o TBI, hydrocephalus s/p  shunt, Sz d/o and dementia admitted for AMS and acute hypoxemic respiratory failure. Respiratory failure likely 2 to L lung atelectasis and PNA. MS change 2 to Depakote toxicity and ? uremia. Overnight 1/9 -> 1/20 pt desat'd and CXR revealed R UL atelectasis     Neuro: has not been on sedation. remains off sedation.  Resp: Cont aggressive pulm toilet. SBT today. but not extubation candidate. minimal mental status. +airway secretions.  CVS: Taper Midodrine as tolerated  HEME: DVT prophylaxis with sq Heparin.  FEN/Renal: Cont enteral feeds/ IV hydration for increased BUN/Cr/ Follow BUN/Cr and UO  dispo: unlikely to wean. Trach referral/evaluation if within family's wishes.

## 2024-01-25 NOTE — CONSULT NOTE ADULT - PROBLEM SELECTOR RECOMMENDATION 2
in setting of PNA, L lung atelectasis, remains intubated, with poor mental status, off sedation. weaning trials per ICU, +secretions, aggressive pulm toilet  Family discussing trach/PEG vs palliative extubation

## 2024-01-26 NOTE — PROGRESS NOTE ADULT - SUBJECTIVE AND OBJECTIVE BOX
Neurology Progress Note    S: Patient seen and examined.    MEDICATIONS:    acetaminophen   Oral Liquid .. 650 milliGRAM(s) Oral every 6 hours PRN  albuterol/ipratropium for Nebulization 3 milliLiter(s) Nebulizer every 6 hours  aspirin  chewable 81 milliGRAM(s) Enteral Tube daily  chlorhexidine 0.12% Liquid 15 milliLiter(s) Oral Mucosa two times a day  chlorhexidine 2% Cloths 1 Application(s) Topical <User Schedule>  heparin   Injectable 5000 Unit(s) SubCutaneous every 8 hours  lacosamide Solution 150 milliGRAM(s) Oral two times a day  lactulose Syrup 10 Gram(s) Oral every 8 hours  latanoprost 0.005% Ophthalmic Solution 1 Drop(s) Both EYES at bedtime  midodrine. 20 milliGRAM(s) Oral three times a day  polyethylene glycol 3350 17 Gram(s) Oral daily  sodium chloride 3%  Inhalation 4 milliLiter(s) Inhalation every 6 hours      LABS:                          7.1    20.14 )-----------( 1147     ( 26 Jan 2024 03:22 )             23.1     01-26    147<H>  |  111<H>  |  95<H>  ----------------------------<  142<H>  3.6   |  33<H>  |  1.36<H>    Ca    8.9      26 Jan 2024 02:34  Phos  4.2     01-26  Mg     2.6     01-26    TPro  5.9<L>  /  Alb  1.6<L>  /  TBili  0.3  /  DBili  x   /  AST  39<H>  /  ALT  20  /  AlkPhos  220<H>  01-26    CAPILLARY BLOOD GLUCOSE          Urinalysis Basic - ( 26 Jan 2024 02:34 )    Color: x / Appearance: x / SG: x / pH: x  Gluc: 142 mg/dL / Ketone: x  / Bili: x / Urobili: x   Blood: x / Protein: x / Nitrite: x   Leuk Esterase: x / RBC: x / WBC x   Sq Epi: x / Non Sq Epi: x / Bacteria: x      I&O's Summary    25 Jan 2024 07:01  -  26 Jan 2024 07:00  --------------------------------------------------------  IN: 2080 mL / OUT: 750 mL / NET: 1330 mL      Vital Signs Last 24 Hrs  T(C): 36.9 (26 Jan 2024 07:23), Max: 37.8 (25 Jan 2024 22:00)  T(F): 98.4 (26 Jan 2024 07:23), Max: 100 (25 Jan 2024 22:00)  HR: 117 (26 Jan 2024 07:23) (91 - 119)  BP: 140/69 (25 Jan 2024 14:00) (140/69 - 140/69)  BP(mean): 91 (25 Jan 2024 14:00) (91 - 91)  RR: 34 (26 Jan 2024 07:23) (14 - 34)  SpO2: 98% (26 Jan 2024 07:23) (98% - 100%)    Parameters below as of 26 Jan 2024 05:23  Patient On (Oxygen Delivery Method): ventilator          General Exam:   General Appearance:  intubated, not sedated  Head: Normocephalic, atraumatic and no dysmorphic features  Ear, Nose, and Throat: Moist mucous membranes  CVS: S1S2+  Resp: mechanical  GI: soft NT/ND  Extremities: No edema or cyanosis  Skin: No bruises or rashes     Neurological Exam:  Mental Status: eyes open tracks s.  Cranial Nerves: PERRL, no BTT, no facial palsy   Motor: trace spontaneous movement RUE  Sensation: minimal withdrawl to noxious in RUE  Reflexes: 1+ throughout at biceps, brachioradialis, triceps, patellars and ankles bilaterally and equal. No clonus. R toe and L toe were both downgoing.  Coordination: unable  Gait: unable      I personally reviewed the below data/images/labs:        < from: CT Head No Cont (01.14.24 @ 11:25) >  IMPRESSION:    1)  chronic ischemic changes with atrophy, most severe in the frontal   lobes. Severe atrophic changes of the anterior corpus callosum and   periventricular white matter..  2)  chronic dural thickening and plaque calcification noted bilaterally,   with no acute hemorrhagic collections suggested.  3. Ventriculostomy noted in situ without evidence for hydrocephalus.    < end of copied text >

## 2024-01-26 NOTE — PROGRESS NOTE ADULT - SUBJECTIVE AND OBJECTIVE BOX
follow up on:  complex medical decision making related to goals of care      OVERNIGHT EVENTS: no overnight events. On SBT. Family at bedside.     Review of systems:      Unable to obtain/Limited due to poor mental status    MEDICATIONS  (STANDING):  albuterol/ipratropium for Nebulization 3 milliLiter(s) Nebulizer every 6 hours  aspirin  chewable 81 milliGRAM(s) Enteral Tube daily  chlorhexidine 0.12% Liquid 15 milliLiter(s) Oral Mucosa two times a day  chlorhexidine 2% Cloths 1 Application(s) Topical <User Schedule>  heparin   Injectable 5000 Unit(s) SubCutaneous every 8 hours  lacosamide Solution 150 milliGRAM(s) Oral two times a day  lactulose Syrup 10 Gram(s) Oral every 8 hours  latanoprost 0.005% Ophthalmic Solution 1 Drop(s) Both EYES at bedtime  midodrine. 20 milliGRAM(s) Oral three times a day  polyethylene glycol 3350 17 Gram(s) Oral daily  sodium chloride 3%  Inhalation 4 milliLiter(s) Inhalation every 6 hours    MEDICATIONS  (PRN):  acetaminophen   Oral Liquid .. 650 milliGRAM(s) Oral every 6 hours PRN Temp greater or equal to 38C (100.4F)      PHYSICAL EXAM:  Vital Signs Last 24 Hrs  T(C): 37.2 (26 Jan 2024 12:00), Max: 37.8 (25 Jan 2024 22:00)  T(F): 99 (26 Jan 2024 12:00), Max: 100 (25 Jan 2024 22:00)  HR: 106 (26 Jan 2024 13:57) (91 - 119)  BP: --  BP(mean): --  RR: 28 (26 Jan 2024 12:00) (14 - 34)  SpO2: 100% (26 Jan 2024 13:57) (98% - 100%)    Parameters below as of 26 Jan 2024 12:38  Patient On (Oxygen Delivery Method): ventilator          Palliative Performance Scale/Karnofsky Score: 20   GENERAL: alert, opens eyes, NAD  HEENT: Atraumatic, oropharynx clear, neck supple, intubated  CHEST/LUNG: unlabored  HEART: Regular rate and rhythm    ABDOMEN: Soft, Nontender, Nondistended   MUSCULOSKELETAL:  No  edema,  bedbound  NERVOUS SYSTEM:  awake, opens eyes, not following commands  SKIN: multiple decubiti noted  Oral intake: npo/TF  LABS:                          7.1    20.14 )-----------( 1147     ( 26 Jan 2024 03:22 )             23.1     01-26    147<H>  |  111<H>  |  95<H>  ----------------------------<  142<H>  3.6   |  33<H>  |  1.36<H>    Ca    8.9      26 Jan 2024 02:34  Phos  4.2     01-26  Mg     2.6     01-26    TPro  5.9<L>  /  Alb  1.6<L>  /  TBili  0.3  /  DBili  x   /  AST  39<H>  /  ALT  20  /  AlkPhos  220<H>  01-26    Urinalysis Basic - ( 26 Jan 2024 02:34 )    Color: x / Appearance: x / SG: x / pH: x  Gluc: 142 mg/dL / Ketone: x  / Bili: x / Urobili: x   Blood: x / Protein: x / Nitrite: x   Leuk Esterase: x / RBC: x / WBC x   Sq Epi: x / Non Sq Epi: x / Bacteria: x        RADIOLOGY & ADDITIONAL STUDIES:

## 2024-01-26 NOTE — PROGRESS NOTE ADULT - ASSESSMENT
68M w/dementia, seizure disorder, hydrocephalus s/p  shunt, prior TBI admitted for AMS and acute hypoxemic respiratory failure 2/2 PNA and pulm edema. Now with persistent hypotension due to septic shock. Neuroloy consulted for AED mgmt.   Poor mental status on exam, remains intubated but off sedation  VPA level 93 -> 87 -> free level 57.9. VPA level 1/21 4  VPA recently lowered by outpatient neurologist due to concerns for lethargy  Ammonia 35 -> 40  Leukocytosis improving  CT with chronic ischemic changes most prominent in bifrontal, prior ventriculostomy     AMS - likely multifactorial due to sepsis, VPA toxicity  Hyperammonemia - ?related to VPA vs other etioloy       Plan:  -  on Lamictal 150 mg BID- assess how patient responds  - routine EEG no seizures  - supportive care per ICU  - when able consider MRI  - GOCs         68M w/dementia, seizure disorder, hydrocephalus s/p  shunt, prior TBI admitted for AMS and acute hypoxemic respiratory failure 2/2 PNA and pulm edema. Now with persistent hypotension due to septic shock. Neuroloy consulted for AED mgmt.   Poor mental status on exam, remains intubated but off sedation  VPA level 93 -> 87 -> free level 57.9. VPA level 1/21 4  VPA recently lowered by outpatient neurologist due to concerns for lethargy  Ammonia 35 -> 40  Leukocytosis improving  CT with chronic ischemic changes most prominent in bifrontal, prior ventriculostomy     AMS - likely multifactorial due to sepsis, VPA toxicity  Hyperammonemia - ?related to VPA vs other etioloy       Plan:  -  on lacosamide 150 mg BID- assess how patient responds  - routine EEG no seizures  - supportive care per ICU  - when able consider MRI  - GOCs

## 2024-01-26 NOTE — PROGRESS NOTE ADULT - PROBLEM SELECTOR PLAN 6
GOC discussion as above with mother Nancy HCP, daughter Tia alternate HCP. Form received in email and placed in chart. ROSANNA drafted for DNR/DNI, comfort measures, no feeding tubes or pressors. Tentative plan for palliative extubation ~1pm, ICU team to coordinate w family in AM.

## 2024-01-26 NOTE — PROGRESS NOTE ADULT - ASSESSMENT
69 yo BM with h/o TBI, hydrocephalus s/p  shunt, Sz d/o and dementia admitted for AMS and acute hypoxemic respiratory failure. Respiratory failure likely 2 to L lung atelectasis and PNA. MS change 2 to Depakote toxicity and ? uremia. Overnight 1/9 -> 1/20 pt desat'd and CXR revealed R UL atelectasis     Neuro: has not been on sedation. remains off sedation.  Resp: Cont aggressive pulm toilet. SBT today. but not extubation candidate. minimal mental status. +airway secretions.  CVS: Taper Midodrine as tolerated  HEME: DVT prophylaxis with sq Heparin.  FEN/Renal: Cont enteral feeds/ IV hydration for increased BUN/Cr/ Follow BUN/Cr and UO  dispo: unlikely to wean. Trach referral/evaluation if within family's wishes. 68M PMH TBI, hydrocephalus s/p  shunt, seizure D/O, dementia p/w insidious decline in mental status and progressive dysphagia over last month p/w worsening AMS and then hypoxemic respiratory failure requiring intubation.  - Unclear etiology of mental status, but has been off sedation for a long time with no improvement in mental status  - Remains intubated 10/450/30%/+5 with moderate secretions: can be placed on PS, but not candidate for extubation d/t mental status, prolonged course of intubation (14 days)  - c/w tube feeds  - off pressors, remains on midodrine to maintain adequate BP  - DVT ppx: Hep SC  - GOC: patient now AND. Family deciding on palliative wean tomorrow at 1pm.

## 2024-01-26 NOTE — PROGRESS NOTE ADULT - CONVERSATION DETAILS
Met with patient's mother/ [phone], in conjunction w alternate HCP daughter Paulina Berparker  and her  at bedside regarding further GOC. She provided copy of HCP on her phone, will email me a copy for the chart. She indicated that they have spoken and wish to proceed w palliative extubation tomorrow 1/27~1pm, wish for another family member to be able to see him before they proceed. They reiterated that he would not want to be kept alive on machines or at a facility, she feels that he is suffering in his condition. They prefer to allow for natural death if his heart stops, agreeable for DNR. Main goal is patient's comfort. Discussed patient may tolerate coming off the ventilator for a time as he has been tolerating SBT today, however given poor mental status may not be able to protect his airway for a significant time, unclear at this time, they verbalized understanding. Advised would be provided w comfort medications as needed for symptom control. Discussed that if he survives off the ventilator, would be a candidate for hospice services, educated on hospice philosophy/services, they would be agreeable.  services offered, she called her  who advised her that he had visited patient last night.     ROSANNA drafted for DNR/DNI, comfort measures, no feeding tubes, no pressors.   Support provided  ICU team made aware

## 2024-01-26 NOTE — PROGRESS NOTE ADULT - SUBJECTIVE AND OBJECTIVE BOX
# CC: Patient is a 68y old  Male who presents with a chief complaint of Septic shock 2/2 pneumonia (2024 08:04)      ## HPI:  Pt is a 68-year-old male with a PMHX of seizure disorder, dementia, hydrocephalus status post  shunt, previous traumatic brain injury, and wheelchair-bound at baseline is interactive presents to ED today brought in by home health aid for increased lethargy and decreased PO intake for the past week. Today pt become altered and unresponsive. EMS called upon arrival pt was hypoglycemic, hypotensive, and hypoxemic. Per home health aid pt has been having worsening difficulty with eating over the last couple of months and takes him a long time to swallow his pills. Per patient's mother, patient recently had Depakote dose decreased due to being more lethargic. Per ED pt with new bed sores on sacrum. H&P obtained by pts home health aid at bedside, pt nonresponsive to questions at this time.   In ED, pt hypoglycemic s/p 2 amps, hypoxemic on RA placed on NRB, and hypotensive (85/52) s/p 2L IVF with minimal improvement subsequently started on levophed.  S/p one dose vanc/zosyn. Labs remarkable for Glucose 39, Cr 3.08, , lactate 4.0, BNP 2942. VB.39/61/38/37.   Chest x-ray: Extensive left lung infiltrate and effusion with slight infiltrate at right base medially. Right ventricular shunt.  CTH noncon: Ventriculostomy noted in situ without evidence for hydrocephalus.  CT chest: Large consolidative/atelectatic changes involving the left lung. Small left pleural effusion. Complete opacification of the left lower lobe bronchus. Secretions within the left mainstem bronchus.   CT abd/pelvis: exophytic lesion at the mid to lower pole of the right kidney and additional bilateral renal cysts    Subjective:   Pt seen and examined at bedside. Pt awake and alert, laying comfortably in bed with NRB in place. Pt not answering to questions at this time, opening eyes to voice.      (2024 12:59)      **O/N:**    ## ROS:    ## Labs:  ** CBC: **                        7.1    20.14 )-----------( 1147     ( 2024 03:22 )             23.1     ** Chem:  **      147<H>  |  111<H>  |  95<H>  ----------------------------<  142<H>  3.6   |  33<H>  |  1.36<H>    Ca    8.9      2024 02:34  Phos  4.2       Mg     2.6         TPro  5.9<L>  /  Alb  1.6<L>  /  TBili  0.3  /  DBili  x   /  AST  39<H>  /  ALT  20  /  AlkPhos  220<H>      ** Coags: **      CAPILLARY BLOOD GLUCOSE            Culture - Bronchial (collected 2024 17:15)  Source: Bronch Wash Bronchial Wash  Gram Stain (2024 21:25):    Rare polymorphonuclear leukocytes seen per low power field    Rare Squamous epithelial cells seen per low power field    Rare Yeast like cells seen per oil power field    Few Gram positive cocci in pairs seen per oil power field  Final Report (2024 21:25):    Normal Respiratory Samantha present    Culture - Urine (collected 2024 12:37)  Source: Clean Catch Clean Catch (Midstream)  Final Report (15 Berny 2024 20:29):    >100,000 CFU/ml Coag Negative Staphylococcus "Susceptibilities not    performed"    Culture - Blood (collected 2024 10:20)  Source: .Blood Blood-Peripheral  Final Report (2024 17:00):    No growth at 5 days    Culture - Blood (collected 2024 10:20)  Source: .Blood Blood-Peripheral  Final Report (2024 17:00):    No growth at 5 days        ## Imaging:    ## Medications:  midodrine. 20 milliGRAM(s) Oral three times a day      albuterol/ipratropium for Nebulization 3 milliLiter(s) Nebulizer every 6 hours  sodium chloride 3%  Inhalation 4 milliLiter(s) Inhalation every 6 hours    acetaminophen   Oral Liquid .. 650 milliGRAM(s) Oral every 6 hours PRN  lacosamide Solution 150 milliGRAM(s) Oral two times a day      aspirin  chewable 81 milliGRAM(s) Enteral Tube daily  heparin   Injectable 5000 Unit(s) SubCutaneous every 8 hours    lactulose Syrup 10 Gram(s) Oral every 8 hours  polyethylene glycol 3350 17 Gram(s) Oral daily              ## O/E:  ICU Vital Signs Last 24 Hrs  T(C): 37.2 (2024 12:00), Max: 37.8 (2024 22:00)  T(F): 99 (2024 12:00), Max: 100 (2024 22:00)  HR: 106 (2024 13:57) (91 - 119)  BP: --  BP(mean): --  ABP: 125/66 (2024 12:00) (84/46 - 160/143)  ABP(mean): 90 (2024 12:00) (62 - 153)  RR: 28 (2024 12:00) (14 - 34)  SpO2: 100% (2024 13:57) (98% - 100%)    O2 Parameters below as of 2024 12:38  Patient On (Oxygen Delivery Method): ventilator          I&O's Summary    2024 07:01  -  2024 07:00  --------------------------------------------------------  IN: 2080 mL / OUT: 750 mL / NET: 1330 mL    2024 07:01  -  2024 14:15  --------------------------------------------------------  IN: 530 mL / OUT: 470 mL / NET: 60 mL      Mode: CPAP with PS, FiO2: 30, PEEP: 5, PS: 10, ITime: 0.7  Gen: lying comfortably in bed in no apparent distress  HEENT: PERRL, EOMI  Resp: CTA B/L no c/r/w  CVS: S1S2 no m/r/g  Abd: soft NT/ND +BS  Ext: no c/c/e  Neuro: A&Ox3    ## Code status:  Goals of care discussion: [x] yes [ ] no  [x] full code  [ ] DNR  [ ] DNI  [ ] SCOOTERST # CC: Patient is a 68y old  Male who presents with a chief complaint of Septic shock 2/2 pneumonia (2024 08:04)      ## HPI:  Pt is a 68-year-old male with a PMHX of seizure disorder, dementia, hydrocephalus status post  shunt, previous traumatic brain injury, and wheelchair-bound at baseline is interactive presents to ED today brought in by home health aid for increased lethargy and decreased PO intake for the past week. Today pt become altered and unresponsive. EMS called upon arrival pt was hypoglycemic, hypotensive, and hypoxemic. Per home health aid pt has been having worsening difficulty with eating over the last couple of months and takes him a long time to swallow his pills. Per patient's mother, patient recently had Depakote dose decreased due to being more lethargic. Per ED pt with new bed sores on sacrum. H&P obtained by pts home health aid at bedside, pt nonresponsive to questions at this time.   In ED, pt hypoglycemic s/p 2 amps, hypoxemic on RA placed on NRB, and hypotensive (85/52) s/p 2L IVF with minimal improvement subsequently started on levophed.  S/p one dose vanc/zosyn. Labs remarkable for Glucose 39, Cr 3.08, , lactate 4.0, BNP 2942. VB.39/61/38/37.   Chest x-ray: Extensive left lung infiltrate and effusion with slight infiltrate at right base medially. Right ventricular shunt.  CTH noncon: Ventriculostomy noted in situ without evidence for hydrocephalus.  CT chest: Large consolidative/atelectatic changes involving the left lung. Small left pleural effusion. Complete opacification of the left lower lobe bronchus. Secretions within the left mainstem bronchus.   CT abd/pelvis: exophytic lesion at the mid to lower pole of the right kidney and additional bilateral renal cysts    Subjective:   Pt seen and examined at bedside. Pt awake and alert, laying comfortably in bed with NRB in place. Pt not answering to questions at this time, opening eyes to voice.      (2024 12:59)      **O/N:**  Remains intubated, day 14    ## ROS:  Unobtainable due to mental status    ## Labs:  ** CBC: **                        7.1    20.14 )-----------( 1147     ( 2024 03:22 )             23.1     ** Chem:  **      147<H>  |  111<H>  |  95<H>  ----------------------------<  142<H>  3.6   |  33<H>  |  1.36<H>    Ca    8.9      2024 02:34  Phos  4.2       Mg     2.6         TPro  5.9<L>  /  Alb  1.6<L>  /  TBili  0.3  /  DBili  x   /  AST  39<H>  /  ALT  20  /  AlkPhos  220<H>      ** Coags: **      CAPILLARY BLOOD GLUCOSE            Culture - Bronchial (collected 2024 17:15)  Source: Bronch Wash Bronchial Wash  Gram Stain (2024 21:25):    Rare polymorphonuclear leukocytes seen per low power field    Rare Squamous epithelial cells seen per low power field    Rare Yeast like cells seen per oil power field    Few Gram positive cocci in pairs seen per oil power field  Final Report (2024 21:25):    Normal Respiratory Samantha present    Culture - Urine (collected 2024 12:37)  Source: Clean Catch Clean Catch (Midstream)  Final Report (15 Berny 2024 20:29):    >100,000 CFU/ml Coag Negative Staphylococcus "Susceptibilities not    performed"    Culture - Blood (collected 2024 10:20)  Source: .Blood Blood-Peripheral  Final Report (2024 17:00):    No growth at 5 days    Culture - Blood (collected 2024 10:20)  Source: .Blood Blood-Peripheral  Final Report (2024 17:00):    No growth at 5 days        ## Imaging:    ## Medications:  midodrine. 20 milliGRAM(s) Oral three times a day      albuterol/ipratropium for Nebulization 3 milliLiter(s) Nebulizer every 6 hours  sodium chloride 3%  Inhalation 4 milliLiter(s) Inhalation every 6 hours    acetaminophen   Oral Liquid .. 650 milliGRAM(s) Oral every 6 hours PRN  lacosamide Solution 150 milliGRAM(s) Oral two times a day      aspirin  chewable 81 milliGRAM(s) Enteral Tube daily  heparin   Injectable 5000 Unit(s) SubCutaneous every 8 hours    lactulose Syrup 10 Gram(s) Oral every 8 hours  polyethylene glycol 3350 17 Gram(s) Oral daily              ## O/E:  ICU Vital Signs Last 24 Hrs  T(C): 37.2 (2024 12:00), Max: 37.8 (2024 22:00)  T(F): 99 (2024 12:00), Max: 100 (2024 22:00)  HR: 106 (2024 13:57) (91 - 119)  BP: --  BP(mean): --  ABP: 125/66 (2024 12:00) (84/46 - 160/143)  ABP(mean): 90 (2024 12:00) (62 - 153)  RR: 28 (2024 12:00) (14 - 34)  SpO2: 100% (2024 13:57) (98% - 100%)    O2 Parameters below as of 2024 12:38  Patient On (Oxygen Delivery Method): ventilator          I&O's Summary    2024 07:01  -  2024 07:00  --------------------------------------------------------  IN: 2080 mL / OUT: 750 mL / NET: 1330 mL    2024 07:01  -  2024 14:15  --------------------------------------------------------  IN: 530 mL / OUT: 470 mL / NET: 60 mL      Mode: CPAP with PS, FiO2: 30, PEEP: 5, PS: 10, ITime: 0.7  Gen: orotracheally intubated on full vent  HEENT: PERRL  Resp: mechanical breath sounds B/L  CVS: S1S2 no m/r/g  Abd: soft NT/ND +BS  Ext: no c/c/e  Neuro: obtunded    ## Code status:  Goals of care discussion: [x] yes [ ] no  [x] full code  [ ] DNR  [ ] DNI  [ ] ROSANNA

## 2024-01-27 NOTE — PROGRESS NOTE ADULT - ASSESSMENT
68M w/dementia, seizure disorder, hydrocephalus s/p  shunt, prior TBI admitted for AMS and acute hypoxemic respiratory failure 2/2 PNA and pulm edema. Now with persistent hypotension due to septic shock. Neuroloy consulted for AED mgmt.   Poor mental status on exam extubated 1/27  VPA level 93 -> 87 -> free level 57.9. VPA level 1/21 4  VPA recently lowered by outpatient neurologist due to concerns for lethargy  Ammonia 35 -> 40  Leukocytosis improving  CT with chronic ischemic changes most prominent in bifrontal, prior ventriculostomy     AMS - likely multifactorial due to sepsis, VPA toxicity  Hyperammonemia - ?related to VPA vs other etioloy   MS not improving- Palliatively extubated on 1/27      Plan:  -  on lacosamide 150 mg BID-  - routine EEG no seizures  - palliative extubation 1/27

## 2024-01-27 NOTE — PROVIDER CONTACT NOTE (CRITICAL VALUE NOTIFICATION) - PERSON GIVING RESULT:
Levon Sterling
BRIGHT / BARNEY
[facility]
Malou Cohen
Liyah Sepulveda
Lab
Lab, Liyah Sepulveda
Linda Cohen
Hemoglobin 6.9
Yes

## 2024-01-27 NOTE — PROGRESS NOTE ADULT - ASSESSMENT
68M PMH TBI, hydrocephalus s/p  shunt, seizure D/O, dementia p/w insidious decline in mental status and progressive dysphagia over last month p/w worsening AMS and then hypoxemic respiratory failure requiring intubation.  - Unclear etiology of mental status, but has been off sedation for a long time with no improvement in mental status  - Discussion with family, at bedside. Decision has been made to make patient comfort care and palliatively extubate without plans for re-intubation. Will D/C daily labs and any medications that do not actively treat any distressing symptoms. Patient may last hours to days given ability to pressure support.

## 2024-01-27 NOTE — PROVIDER CONTACT NOTE (CRITICAL VALUE NOTIFICATION) - TEST AND RESULT REPORTED:
Hemoglobin 6.7   Platelets: 1070
platelets 1144
Hemoglobin
Hgb- 6.9, oqjaerqoj- 4680
Lactate 4.8
Liyah brewer
POTASSIUM = 2.9
potassium 2.7
platelet 1032

## 2024-01-27 NOTE — PROVIDER CONTACT NOTE (CRITICAL VALUE NOTIFICATION) - NAME OF MD/NP/PA/DO NOTIFIED:
Yuko ZHU
MARKO BERNAL
MARKO Argueta
MARKO Ewing
MARKO Huntley
MARKO Dowell
MARKO Huntley
NP PANCHO DOWNEY
Dr Cuevas

## 2024-01-27 NOTE — PROVIDER CONTACT NOTE (CRITICAL VALUE NOTIFICATION) - ACTION/TREATMENT ORDERED:
Jose R Huntley made aware. 1 unit of rbc will be ordered.
MARKO Huntley made aware. as per discussion, repeat cbc and type and screen will be ordered
type and screen and repeat to be ordered by PA
No interventions ordered

## 2024-01-27 NOTE — PROGRESS NOTE ADULT - SUBJECTIVE AND OBJECTIVE BOX
Neurology Progress Note    S: Patient seen and examined. palliative extubated      MEDICATIONS:    acetaminophen   Oral Liquid .. 650 milliGRAM(s) Oral every 6 hours PRN  chlorhexidine 2% Cloths 1 Application(s) Topical <User Schedule>  lacosamide IVPB 150 milliGRAM(s) IV Intermittent <User Schedule>  latanoprost 0.005% Ophthalmic Solution 1 Drop(s) Both EYES at bedtime  LORazepam   Injectable 1 milliGRAM(s) IV Push every 2 hours PRN  morphine  - Injectable 2 milliGRAM(s) IV Push every 1 hour PRN      LABS:                          6.7    17.06 )-----------( 1070     ( 27 Jan 2024 02:55 )             21.5     01-27    147<H>  |  109<H>  |  80<H>  ----------------------------<  128<H>  3.4<L>   |  32<H>  |  1.22    Ca    9.1      27 Jan 2024 02:55  Phos  4.3     01-27  Mg     2.5     01-27    TPro  6.0  /  Alb  1.6<L>  /  TBili  0.3  /  DBili  x   /  AST  32  /  ALT  18  /  AlkPhos  201<H>  01-27    CAPILLARY BLOOD GLUCOSE          Urinalysis Basic - ( 27 Jan 2024 02:55 )    Color: x / Appearance: x / SG: x / pH: x  Gluc: 128 mg/dL / Ketone: x  / Bili: x / Urobili: x   Blood: x / Protein: x / Nitrite: x   Leuk Esterase: x / RBC: x / WBC x   Sq Epi: x / Non Sq Epi: x / Bacteria: x      I&O's Summary    26 Jan 2024 07:01  -  27 Jan 2024 07:00  --------------------------------------------------------  IN: 2480 mL / OUT: 1500 mL / NET: 980 mL    27 Jan 2024 07:01  -  27 Jan 2024 23:47  --------------------------------------------------------  IN: 690 mL / OUT: 1095 mL / NET: -405 mL      Vital Signs Last 24 Hrs  T(C): 36.6 (27 Jan 2024 23:19), Max: 38.3 (27 Jan 2024 12:00)  T(F): 97.9 (27 Jan 2024 23:19), Max: 100.9 (27 Jan 2024 12:00)  HR: 98 (27 Jan 2024 23:00) (87 - 115)  BP: 113/67 (27 Jan 2024 23:00) (98/57 - 113/67)  BP(mean): 81 (27 Jan 2024 23:00) (68 - 81)  RR: 22 (27 Jan 2024 23:00) (14 - 38)  SpO2: 82% (27 Jan 2024 23:00) (81% - 100%)    Parameters below as of 27 Jan 2024 19:30  Patient On (Oxygen Delivery Method): nasal cannula  O2 Flow (L/min): 2      Neurological Exam:  Mental Status: eyes open  no tracked  Cranial Nerves: PERRL, no BTT, no facial palsy   Motor: trace spontaneous movement RUE  Sensation: minimal withdrawl to noxious in RUE  Reflexes: 1+ throughout at biceps, brachioradialis, triceps, patellars and ankles bilaterally and equal. No clonus. R toe and L toe were both downgoing.  Coordination: unable  Gait: unable      I personally reviewed the below data/images/labs:        < from: CT Head No Cont (01.14.24 @ 11:25) >  IMPRESSION:    1)  chronic ischemic changes with atrophy, most severe in the frontal   lobes. Severe atrophic changes of the anterior corpus callosum and   periventricular white matter..  2)  chronic dural thickening and plaque calcification noted bilaterally,   with no acute hemorrhagic collections suggested.  3. Ventriculostomy noted in situ without evidence for hydrocephalus.    < end of copied text >

## 2024-01-27 NOTE — PROGRESS NOTE ADULT - SUBJECTIVE AND OBJECTIVE BOX
# CC: Patient is a 68y old  Male who presents with a chief complaint of Septic shock 2/2 pneumonia (2024 08:04)      ## HPI:  Pt is a 68-year-old male with a PMHX of seizure disorder, dementia, hydrocephalus status post  shunt, previous traumatic brain injury, and wheelchair-bound at baseline is interactive presents to ED today brought in by home health aid for increased lethargy and decreased PO intake for the past week. Today pt become altered and unresponsive. EMS called upon arrival pt was hypoglycemic, hypotensive, and hypoxemic. Per home health aid pt has been having worsening difficulty with eating over the last couple of months and takes him a long time to swallow his pills. Per patient's mother, patient recently had Depakote dose decreased due to being more lethargic. Per ED pt with new bed sores on sacrum. H&P obtained by pts home health aid at bedside, pt nonresponsive to questions at this time.   In ED, pt hypoglycemic s/p 2 amps, hypoxemic on RA placed on NRB, and hypotensive (85/52) s/p 2L IVF with minimal improvement subsequently started on levophed.  S/p one dose vanc/zosyn. Labs remarkable for Glucose 39, Cr 3.08, , lactate 4.0, BNP 2942. VB.39/61/38/37.   Chest x-ray: Extensive left lung infiltrate and effusion with slight infiltrate at right base medially. Right ventricular shunt.  CTH noncon: Ventriculostomy noted in situ without evidence for hydrocephalus.  CT chest: Large consolidative/atelectatic changes involving the left lung. Small left pleural effusion. Complete opacification of the left lower lobe bronchus. Secretions within the left mainstem bronchus.   CT abd/pelvis: exophytic lesion at the mid to lower pole of the right kidney and additional bilateral renal cysts    Subjective:   Pt seen and examined at bedside. Pt awake and alert, laying comfortably in bed with NRB in place. Pt not answering to questions at this time, opening eyes to voice.      (2024 12:59)      **O/N:**  Remains intubated, day 15    ## ROS:  Unobtainable due to mental status    ## Labs:  ** CBC: **                        6.7    17.06 )-----------( 1070     ( 2024 02:55 )             21.5     ** Chem:  **      147<H>  |  109<H>  |  80<H>  ----------------------------<  128<H>  3.4<L>   |  32<H>  |  1.22    Ca    9.1      2024 02:55  Phos  4.3       Mg     2.5         TPro  6.0  /  Alb  1.6<L>  /  TBili  0.3  /  DBili  x   /  AST  32  /  ALT  18  /  AlkPhos  201<H>      ** Coags: **      CAPILLARY BLOOD GLUCOSE            ## Medications:        acetaminophen   Oral Liquid .. 650 milliGRAM(s) Oral every 6 hours PRN  lacosamide Solution 150 milliGRAM(s) Oral two times a day  LORazepam   Injectable 1 milliGRAM(s) IV Push every 2 hours PRN  morphine  - Injectable 2 milliGRAM(s) IV Push every 1 hour PRN                    # Imaging:  No new pertinent imaging    ## O/E:  T(F): 100 (24 @ 04:00), Max: 100 (24 @ 04:00)  HR: 105 (24 @ 13:58) (87 - 115)  BP: --  ABP:  (86/48 - 165/91)  ABP(mean):  (63 - 122)  RR: 16 (24 @ 12:00) (13 - 30)  SpO2: 96% (24 @ 13:58) (96% - 100%)     @ 07: @ 07:00  --------------------------------------------------------  IN: 2480 mL / OUT: 1500 mL / NET: 980 mL     @ 07: @ 14:10  --------------------------------------------------------  IN: 590 mL / OUT: 220 mL / NET: 370 mL      Mode: AC/ CMV (Assist Control/ Continuous Mandatory Ventilation), RR (machine): 10, TV (machine): 450, FiO2: 25, PEEP: 5, ITime: 0.7, MAP: 9, PIP: 27  Gen: orotracheally intubated on full vent  HEENT: PERRL  Resp: mechanical breath sounds B/L  CVS: S1S2 no m/r/g  Abd: soft NT/ND +BS  Ext: no c/c/e  Neuro: obtunded    ## Code status:  Goals of care discussion: [x] yes [ ] no  [x] full code  [ ] DNR  [ ] DNI  [ ] ROSANNA

## 2024-01-28 NOTE — CHART NOTE - NSCHARTNOTEFT_GEN_A_CORE
ICU/CCU Transfer Note    Transfer from: ICU/ CCU  Transfer to: medicine   Accepting physician: Dr zelaya   Case discussed with:  Dr Dove       MICU COURSE:    68M PMH TBI, hydrocephalus s/p  shunt, seizure D/O, dementia p/w insidious decline in mental status and progressive dysphagia over last month p/w worsening AMS and then hypoxemic respiratory failure requiring intubation.  - Unclear etiology of mental status, but has been off sedation for a long time with no improvement in mental status  - Discussion with family, at bedside. Decision has been made to make patient comfort care and palliatively extubate without plans for re-intubation. Will D/C daily labs and any medications that do not actively treat any distressing symptoms. Patient may last hours to days given ability to pressure support.      ASSESSMENT & PLAN:   ===================  - patient palliative extubation, now DNR ,   will transfer to medical floor , Will need Hospice Evaluation     - continue acetaminophen   Oral Liquid .. 650 PRN  - continue lacosamide IVPB 150  - continue LORazepam   Injectable 1 PRN  - continue morphine  - Injectable 2 PRN      CONSULTS: acetaminophen   Oral Liquid .. 650 milliGRAM(s) Oral every 6 hours PRN  chlorhexidine 2% Cloths 1 Application(s) Topical <User Schedule>  lacosamide IVPB 150 milliGRAM(s) IV Intermittent <User Schedule>  latanoprost 0.005% Ophthalmic Solution 1 Drop(s) Both EYES at bedtime  LORazepam   Injectable 1 milliGRAM(s) IV Push every 2 hours PRN  morphine  - Injectable 2 milliGRAM(s) IV Push every 1 hour PRN        For Follow-Up:      VITALS  ========  Vital Signs Last 24 Hrs  T(C): 36.3 (28 Jan 2024 09:21), Max: 37.1 (27 Jan 2024 19:30)  T(F): 97.3 (28 Jan 2024 09:21), Max: 98.8 (27 Jan 2024 19:30)  HR: 99 (28 Jan 2024 09:21) (93 - 111)  BP: 117/71 (28 Jan 2024 09:21) (98/57 - 139/74)  BP(mean): 83 (28 Jan 2024 09:21) (68 - 91)  RR: 22 (28 Jan 2024 09:21) (19 - 38)  SpO2: 98% (28 Jan 2024 09:21) (81% - 99%)    Parameters below as of 27 Jan 2024 19:30  Patient On (Oxygen Delivery Method): nasal cannula  O2 Flow (L/min): 2    I&O's Summary    27 Jan 2024 07:01  -  28 Jan 2024 07:00  --------------------------------------------------------  IN: 640 mL / OUT: 1515 mL / NET: -875 mL          LABS

## 2024-01-28 NOTE — PROGRESS NOTE ADULT - ASSESSMENT
68M PMH TBI, hydrocephalus s/p  shunt, seizure D/O, dementia p/w insidious decline in mental status and progressive dysphagia over last month p/w worsening AMS and then hypoxemic respiratory failure requiring intubation.  - Unclear etiology of mental status, but has been off sedation for a long time with no improvement in mental status  - Now palliatively extubated yesterday. Does not appear to be in any pain or distress at present.  - Patient may last days in current state. Continue any and all medications that treat any distressing symptoms. No daily lab draws.  - Code status: DNR/DNI  - Patient can be transferred to general medicine for further management. May consider palliative care referral.

## 2024-01-28 NOTE — PROGRESS NOTE ADULT - SUBJECTIVE AND OBJECTIVE BOX
Neurology Progress Note    S: Patient seen and examined. palliative extubated      MEDICATIONS:    acetaminophen   Oral Liquid .. 650 milliGRAM(s) Oral every 6 hours PRN  chlorhexidine 2% Cloths 1 Application(s) Topical <User Schedule>  lacosamide IVPB 150 milliGRAM(s) IV Intermittent <User Schedule>  latanoprost 0.005% Ophthalmic Solution 1 Drop(s) Both EYES at bedtime  LORazepam   Injectable 1 milliGRAM(s) IV Push every 2 hours PRN  morphine  - Injectable 2 milliGRAM(s) IV Push every 1 hour PRN      LABS:                          6.7    17.06 )-----------( 1070     ( 27 Jan 2024 02:55 )             21.5     01-27    147<H>  |  109<H>  |  80<H>  ----------------------------<  128<H>  3.4<L>   |  32<H>  |  1.22    Ca    9.1      27 Jan 2024 02:55  Phos  4.3     01-27  Mg     2.5     01-27    TPro  6.0  /  Alb  1.6<L>  /  TBili  0.3  /  DBili  x   /  AST  32  /  ALT  18  /  AlkPhos  201<H>  01-27    CAPILLARY BLOOD GLUCOSE          Urinalysis Basic - ( 27 Jan 2024 02:55 )    Color: x / Appearance: x / SG: x / pH: x  Gluc: 128 mg/dL / Ketone: x  / Bili: x / Urobili: x   Blood: x / Protein: x / Nitrite: x   Leuk Esterase: x / RBC: x / WBC x   Sq Epi: x / Non Sq Epi: x / Bacteria: x      I&O's Summary    27 Jan 2024 07:01  -  28 Jan 2024 07:00  --------------------------------------------------------  IN: 640 mL / OUT: 1515 mL / NET: -875 mL      Vital Signs Last 24 Hrs  T(C): 36.3 (28 Jan 2024 09:21), Max: 38.3 (27 Jan 2024 12:00)  T(F): 97.3 (28 Jan 2024 09:21), Max: 100.9 (27 Jan 2024 12:00)  HR: 99 (28 Jan 2024 09:21) (93 - 115)  BP: 117/71 (28 Jan 2024 09:21) (98/57 - 139/74)  BP(mean): 83 (28 Jan 2024 09:21) (68 - 91)  RR: 22 (28 Jan 2024 09:21) (16 - 38)  SpO2: 98% (28 Jan 2024 09:21) (81% - 99%)    Parameters below as of 27 Jan 2024 19:30  Patient On (Oxygen Delivery Method): nasal cannula  O2 Flow (L/min): 2        Neurological Exam:  Mental Status: eyes open  no tracked  Cranial Nerves: PERRL, no BTT, no facial palsy   Motor: trace spontaneous movement RUE  Sensation: minimal withdrawl to noxious in RUE  Reflexes: 1+ throughout at biceps, brachioradialis, triceps, patellars and ankles bilaterally and equal. No clonus. R toe and L toe were both downgoing.  Coordination: unable  Gait: unable      I personally reviewed the below data/images/labs:        < from: CT Head No Cont (01.14.24 @ 11:25) >  IMPRESSION:    1)  chronic ischemic changes with atrophy, most severe in the frontal   lobes. Severe atrophic changes of the anterior corpus callosum and   periventricular white matter..  2)  chronic dural thickening and plaque calcification noted bilaterally,   with no acute hemorrhagic collections suggested.  3. Ventriculostomy noted in situ without evidence for hydrocephalus.    < end of copied text >

## 2024-01-28 NOTE — PROGRESS NOTE ADULT - SUBJECTIVE AND OBJECTIVE BOX
# CC: Patient is a 68y old  Male who presents with a chief complaint of Septic shock 2/2 pneumonia (2024 08:04)      ## HPI:  Pt is a 68-year-old male with a PMHX of seizure disorder, dementia, hydrocephalus status post  shunt, previous traumatic brain injury, and wheelchair-bound at baseline is interactive presents to ED today brought in by home health aid for increased lethargy and decreased PO intake for the past week. Today pt become altered and unresponsive. EMS called upon arrival pt was hypoglycemic, hypotensive, and hypoxemic. Per home health aid pt has been having worsening difficulty with eating over the last couple of months and takes him a long time to swallow his pills. Per patient's mother, patient recently had Depakote dose decreased due to being more lethargic. Per ED pt with new bed sores on sacrum. H&P obtained by pts home health aid at bedside, pt nonresponsive to questions at this time.   In ED, pt hypoglycemic s/p 2 amps, hypoxemic on RA placed on NRB, and hypotensive (85/52) s/p 2L IVF with minimal improvement subsequently started on levophed.  S/p one dose vanc/zosyn. Labs remarkable for Glucose 39, Cr 3.08, , lactate 4.0, BNP 2942. VB.39/61/38/37.   Chest x-ray: Extensive left lung infiltrate and effusion with slight infiltrate at right base medially. Right ventricular shunt.  CTH noncon: Ventriculostomy noted in situ without evidence for hydrocephalus.  CT chest: Large consolidative/atelectatic changes involving the left lung. Small left pleural effusion. Complete opacification of the left lower lobe bronchus. Secretions within the left mainstem bronchus.   CT abd/pelvis: exophytic lesion at the mid to lower pole of the right kidney and additional bilateral renal cysts    Subjective:   Pt seen and examined at bedside. Pt awake and alert, laying comfortably in bed with NRB in place. Pt not answering to questions at this time, opening eyes to voice.      (2024 12:59)      **O/N:**  Palliatively extubated yesterday    ## ROS:  Unobtainable due to mental status    ## Labs:  No new labs    ## Medications:  acetaminophen   Oral Liquid .. 650 milliGRAM(s) Oral every 6 hours PRN  lacosamide Solution 150 milliGRAM(s) Oral two times a day  LORazepam   Injectable 1 milliGRAM(s) IV Push every 2 hours PRN  morphine  - Injectable 2 milliGRAM(s) IV Push every 1 hour PRN    # Imaging:  No new pertinent imaging    ## O/E:  Gen: lying comfortably in bed in no apparent distress  HEENT: sluggish pupils  Resp: CTA B/L no c/r/w  CVS: S1S2 no m/r/g  Abd: soft NT/ND +BS  Ext: no c/c/e  Neuro: obtunded    ## Code status:  Goals of care discussion: [x] yes [ ] no  [x] full code  [ ] DNR  [ ] DNI  [ ] MOLST

## 2024-01-29 NOTE — PROGRESS NOTE ADULT - REASON FOR ADMISSION
Septic shock
Septic shock 2/2 pneumonia
PNA
Septic shock 2/2 pneumonia

## 2024-01-29 NOTE — PROGRESS NOTE ADULT - PROBLEM SELECTOR PLAN 6
Mother Nancy is HCP, daughter Tia alternate HCP, form in chart. MOLST on file for DNR/DNI, comfort measures, no feeding tubes or pressors. s/p palliative extubation 1/27. on IV seizure meds, not tolerating po. Hospice referral made. Discussed w mother and daughter, home unlikely to be an option as pt lives alone, aides unable to administer meds. Palliative will follow. May need IPU for IV seizure meds if accepted, d/w HCN. Mother Nancy is HCP, daughter Tia alternate HCP, form in chart. MOLST on file for DNR/DNI, comfort measures, no feeding tubes or pressors. s/p palliative extubation 1/27. on IV seizure meds, not tolerating po. Hospice referral made. Discussed w mother and daughter, home unlikely to be an option as pt lives alone, aides unable to administer meds. Palliative will follow. May need IPU for IV seizure meds if accepted, d/w HCN. Spoke w Dr Parker at [facility], accepted for IPU, no beds currently. SW and primary team made aware.

## 2024-01-29 NOTE — PROGRESS NOTE ADULT - PROBLEM SELECTOR PLAN 2
in setting of PNA, L lung atelectasis, remains intubated, with poor mental status, off sedation. SBTs per ICU, +secretions, aggressive pulm toilet    s/p palliative extubation 1/27 per family wishes  MOLST on file for DNR/DNI, comfort measures, no pressors    morphine 2mg ivp q1h prn dyspnea - has not required in past 24h  glycopyrrolate prn secretions  ativan 1mg ivp q4h prn agitation
in setting of PNA, L lung atelectasis, remains intubated, with poor mental status, off sedation. SBTs per ICU, +secretions, aggressive pulm toilet    Family requesting palliative extubation tomorrow 1/27  ROSANNA drafted for DNR/DNI, comfort measures, no pressors

## 2024-01-29 NOTE — PROGRESS NOTE ADULT - SUBJECTIVE AND OBJECTIVE BOX
follow up on:  complex medical decision making related to goals of care      OVERNIGHT EVENTS: s/p pall extubation 1/27, on comfort measures, appears comfortable on NC.     Review of systems:      Unable to obtain/Limited due to poor mental status    MEDICATIONS  (STANDING):  chlorhexidine 2% Cloths 1 Application(s) Topical <User Schedule>  lacosamide IVPB 150 milliGRAM(s) IV Intermittent <User Schedule>  latanoprost 0.005% Ophthalmic Solution 1 Drop(s) Both EYES at bedtime    MEDICATIONS  (PRN):  acetaminophen   Oral Liquid .. 650 milliGRAM(s) Oral every 6 hours PRN Temp greater or equal to 38C (100.4F)  LORazepam   Injectable 1 milliGRAM(s) IV Push every 2 hours PRN anxiety/ agitation  morphine  - Injectable 2 milliGRAM(s) IV Push every 1 hour PRN shortness of breath      PHYSICAL EXAM:  Vital Signs Last 24 Hrs  T(C): 37.4 (29 Jan 2024 10:47), Max: 37.4 (28 Jan 2024 17:37)  T(F): 99.4 (29 Jan 2024 10:47), Max: 99.4 (28 Jan 2024 20:27)  HR: 113 (29 Jan 2024 10:47) (101 - 113)  BP: 135/76 (29 Jan 2024 10:47) (99/64 - 135/76)  BP(mean): --  RR: 22 (29 Jan 2024 10:47) (20 - 22)  SpO2: 97% (29 Jan 2024 10:47) (88% - 100%)    Parameters below as of 29 Jan 2024 10:47  Patient On (Oxygen Delivery Method): nasal cannula          Palliative Performance Scale/Karnofsky Score: 20      GENERAL: lethargic, opens eyes, on NC,  NAD  HEENT: Atraumatic, oropharynx clear, neck supple  CHEST/LUNG: unlabored  HEART: Regular rate and rhythm    ABDOMEN: Soft, Nontender, Nondistended   MUSCULOSKELETAL:  No  edema,  bedbound  NERVOUS SYSTEM:  lethargic, not following commands  SKIN: No rashes or lesions noted  Oral intake: npo    LABS:                RADIOLOGY & ADDITIONAL STUDIES:

## 2024-01-29 NOTE — PROGRESS NOTE ADULT - SUBJECTIVE AND OBJECTIVE BOX
Patient is a 68y old  Male who presents with a chief complaint of Septic shock 2/2 pneumonia (29 Jan 2024 14:47)    INTERVAL HPI/OVERNIGHT EVENTS: no acute events  SUBJECTIVE: unable to obtain    MEDICATIONS  (STANDING):  chlorhexidine 2% Cloths 1 Application(s) Topical <User Schedule>  lacosamide IVPB 150 milliGRAM(s) IV Intermittent <User Schedule>  latanoprost 0.005% Ophthalmic Solution 1 Drop(s) Both EYES at bedtime    MEDICATIONS  (PRN):  acetaminophen   Oral Liquid .. 650 milliGRAM(s) Oral every 6 hours PRN Temp greater or equal to 38C (100.4F)  LORazepam   Injectable 1 milliGRAM(s) IV Push every 2 hours PRN anxiety/ agitation  morphine  - Injectable 2 milliGRAM(s) IV Push every 1 hour PRN shortness of breath    Allergies    No Known Drug Allergies  shellfish (Anaphylaxis)    Intolerances      REVIEW OF SYSTEMS:  All other systems reviewed and are negative    Vital Signs Last 24 Hrs  T(C): 37.4 (29 Jan 2024 10:47), Max: 37.4 (28 Jan 2024 17:37)  T(F): 99.4 (29 Jan 2024 10:47), Max: 99.4 (28 Jan 2024 20:27)  HR: 113 (29 Jan 2024 10:47) (101 - 113)  BP: 135/76 (29 Jan 2024 10:47) (99/64 - 135/76)  BP(mean): --  RR: 22 (29 Jan 2024 10:47) (20 - 22)  SpO2: 97% (29 Jan 2024 10:47) (88% - 100%)    Parameters below as of 29 Jan 2024 10:47  Patient On (Oxygen Delivery Method): nasal cannula      Daily     Daily   I&O's Summary    28 Jan 2024 07:01  -  29 Jan 2024 07:00  --------------------------------------------------------  IN: 50 mL / OUT: 1600 mL / NET: -1550 mL      CAPILLARY BLOOD GLUCOSE        PHYSICAL EXAM:  GENERAL: NAD, well-groomed, well-developed. comfortable  HEAD:  Atraumatic, Normocephalic  NERVOUS SYSTEM:  resting  CARDIOLOGY: Ns1, s1  PULMNOLOGY: ctabl      Labs

## 2024-01-29 NOTE — PROGRESS NOTE ADULT - PROBLEM SELECTOR PLAN 4
hx hydrocephalus s/p  shunt c/b recurrent infection/complications, has 24h HHA, dependent for care. Freq positioning.
hx hydrocephalus s/p  shunt c/b recurrent infection/complications, has 24h HHA, dependent for care. Freq positioning.

## 2024-01-29 NOTE — PROGRESS NOTE ADULT - PROBLEM SELECTOR PLAN 5
Clinical evidence indicates that the patient has Severe protein calorie malnutrition/ 3rd degree    In context of     Chronic Illness (>1 month)    Energy/Food intake <50% of estimated energy requirement >5 days  Weight loss: Moderate - severe (lbs lost recently)  Body Fat loss: Severe   (Cachexia, temporal wasting,  muscle atrophy)  Muscle mass loss: Severe  (Skin failure/pressure ulcers)  Fluid Accumulation: Severe ( pleural effusions)   Strength: weakened severe (bedbound)    Recommend:   alb 1.5 - nutrition consult  oral care    trial of NG tube feeds  - hold in anticipation of palliative extubation tomorrow  local wound care, off loading.
Clinical evidence indicates that the patient has Severe protein calorie malnutrition/ 3rd degree    In context of     Chronic Illness (>1 month)    Energy/Food intake <50% of estimated energy requirement >5 days  Weight loss: Moderate - severe (lbs lost recently)  Body Fat loss: Severe   (Cachexia, temporal wasting,  muscle atrophy)  Muscle mass loss: Severe  (Skin failure/pressure ulcers)  Fluid Accumulation: Severe ( pleural effusions)   Strength: weakened severe (bedbound)    Recommend:   alb 1.5 - nutrition consult  oral care  comfort feeds as tolerated if mental status improves

## 2024-01-29 NOTE — PROGRESS NOTE ADULT - PROBLEM SELECTOR PLAN 1
likely multifactorial in setting of sepsis, VPA toxicity (free VPA level 57), CT head no acute changes, EEG no epileptiform activity, neuro following. Off depakote, resumed on lacosamide as tolerated per neuro.
likely multifactorial in setting of sepsis, VPA toxicity (free VPA level 57), CT head no acute changes, EEG no epileptiform activity, neuro following. Off depakote, resumed on lacosamide as tolerated per neuro.

## 2024-01-29 NOTE — PROGRESS NOTE ADULT - TIME BILLING
Reviewed images and labs. Clinical decision making, changes in medication regimen. Frequent bedside visits. Discussion with family.
Time spent for extensive review of the physical chart, electronic health record, and documentation to obtain collateral information including but not limited to:    - Current inpatient records (ED, H&P, primary team, and consultants if applicable)   - Inpatient values/results (biomarkers, immunoassays, imaging, and microbiology results)   - Current or proposed treatment plans   - Pharmacotherapy review   Time spent for counseling and education with patient/family  Time spent discussing and coordinating care with primary team and interdisciplinary staff and floor staff.
Time spent for extensive review of the physical chart, electronic health record, and documentation to obtain collateral information including but not limited to:    - Current inpatient records (ED, H&P, primary team, and consultants if applicable)   - Inpatient values/results (biomarkers, immunoassays, imaging, and microbiology results)   - Current or proposed treatment plans   - Pharmacotherapy review   Time spent for counseling and education with patient/family  Time spent discussing and coordinating care with primary team and interdisciplinary staff and floor staff.

## 2024-01-29 NOTE — PROGRESS NOTE ADULT - PROBLEM SELECTOR PLAN 3
s/p antibiotics for PNA, on midodrine, SOFIA improving, Cr downtrending. Supportive care, no further labs.
s/p antibiotics for PNA, on midodrine, SOFIA improving, Cr downtrending.

## 2024-01-29 NOTE — PROGRESS NOTE ADULT - ASSESSMENT
69 yo male w/ pmhx of TBI, hydrocephalus, sp  shunt, seizure disorder dementia admitted to ICU for acute hypoxemic respiratory failure. while intubated, pt had no recovery of mental status. anoxic brain injury? pt was liberated from ventilator by mother and made DNR/DNI, comfort care.   - PC consulted and pt has been accepted into IPU  - there are NO IPU beds at this time    PLAN  - cw GMF  - comfort care   - NO vital signs  - NO blood work  - follow up with IPU

## 2024-01-30 NOTE — CHART NOTE - NSCHARTNOTESELECT_GEN_ALL_CORE
CCM update/Event Note
Event Note
Nutrition Services
POCUS note/Event Note
POCUS/Event Note
to medicine/Transfer Note
POCUS note/Event Note

## 2024-01-30 NOTE — CHART NOTE - NSCHARTNOTEFT_GEN_A_CORE
To Whom it may concern,  I am writing this letter on behalf of Oli Baxter. His grand father, Fortino Bustos, who is under my care, has been admitted to [facility] from 1/14/2024 to 1/30/2024. Pt has been hospitalized for pneumonia and was palliatively extubated. Pt is on comfort care measures and is awaiting for hospice placement. He is actively dying and family should visit as soon as possible. Please reach out to me if there are any questions [phone].    very respectfully,  Constantine Guardado DO  Hospitalist

## 2024-01-30 NOTE — CHART NOTE - NSCHARTNOTEFT_GEN_A_CORE
To Whom it may concern,  I am writing this letter on behalf of Paulina Moya. Her father, Fortino Bustos, who is under my care, has been admitted to [facility] from 1/14/2024 to 1/30/2024. Pt has been hospitalized for pneumonia and was palliatively extubated. Pt is on comfort care measures and is awaiting for hospice placement. He is actively dying and family should visit as soon as possible. Please reach out to me if there are any questions [phone].    very respectfully,  Constantine Guardado DO  Hospitalist. To Whom it may concern,  I am writing this letter on behalf of Paulina Moya. Her father, Fortino Bustos, who is under my care, has been admitted to [facility] from 1/14/2024 to 1/30/2024. Pt has been hospitalized for pneumonia and was palliatively extubated. Pt is on comfort care measures and is awaiting for hospice placement. He is actively dying and family should visit as soon as possible and as often as possible. Paulina has been by bedside on 1/29/2024 and 1/30/2024. Please reach out to me if there are any questions [phone].    very respectfully,  Constantine Guardado DO  Hospitalist.

## 2024-01-30 NOTE — DISCHARGE NOTE FOR THE EXPIRED PATIENT - HOSPITAL COURSE
69 yo male w/ pmhx of TBI, hydrocephalus, sp  shunt, seizure disorder dementia admitted to ICU on 1/14/2024 for acute hypoxemic respiratory failure. while intubated, pt had no recovery of mental status. anoxic brain injury? pt was liberated from ventilator by mother/daughter and made DNR/DNI, comfort care. Pt past away with family daughter Paulina Moya at bedside on 1/30/2024 11:34 am.

## 2024-02-02 DIAGNOSIS — E72.20 DISORDER OF UREA CYCLE METABOLISM, UNSPECIFIED: ICD-10-CM

## 2024-02-02 DIAGNOSIS — N28.1 CYST OF KIDNEY, ACQUIRED: ICD-10-CM

## 2024-02-02 DIAGNOSIS — R65.21 SEVERE SEPSIS WITH SEPTIC SHOCK: ICD-10-CM

## 2024-02-02 DIAGNOSIS — Z87.820 PERSONAL HISTORY OF TRAUMATIC BRAIN INJURY: ICD-10-CM

## 2024-02-02 DIAGNOSIS — Z99.3 DEPENDENCE ON WHEELCHAIR: ICD-10-CM

## 2024-02-02 DIAGNOSIS — A41.9 SEPSIS, UNSPECIFIED ORGANISM: ICD-10-CM

## 2024-02-02 DIAGNOSIS — J90 PLEURAL EFFUSION, NOT ELSEWHERE CLASSIFIED: ICD-10-CM

## 2024-02-02 DIAGNOSIS — J96.01 ACUTE RESPIRATORY FAILURE WITH HYPOXIA: ICD-10-CM

## 2024-02-02 DIAGNOSIS — Z91.013 ALLERGY TO SEAFOOD: ICD-10-CM

## 2024-02-02 DIAGNOSIS — Z66 DO NOT RESUSCITATE: ICD-10-CM

## 2024-02-02 DIAGNOSIS — E87.6 HYPOKALEMIA: ICD-10-CM

## 2024-02-02 DIAGNOSIS — N17.9 ACUTE KIDNEY FAILURE, UNSPECIFIED: ICD-10-CM

## 2024-02-02 DIAGNOSIS — E16.2 HYPOGLYCEMIA, UNSPECIFIED: ICD-10-CM

## 2024-02-02 DIAGNOSIS — R53.2 FUNCTIONAL QUADRIPLEGIA: ICD-10-CM

## 2024-02-02 DIAGNOSIS — G93.40 ENCEPHALOPATHY, UNSPECIFIED: ICD-10-CM

## 2024-02-02 DIAGNOSIS — G40.909 EPILEPSY, UNSPECIFIED, NOT INTRACTABLE, WITHOUT STATUS EPILEPTICUS: ICD-10-CM

## 2024-02-02 DIAGNOSIS — F03.90 UNSPECIFIED DEMENTIA WITHOUT BEHAVIORAL DISTURBANCE: ICD-10-CM

## 2024-02-02 DIAGNOSIS — R64 CACHEXIA: ICD-10-CM

## 2024-02-02 DIAGNOSIS — L89.159 PRESSURE ULCER OF SACRAL REGION, UNSPECIFIED STAGE: ICD-10-CM

## 2024-02-02 DIAGNOSIS — Z11.52 ENCOUNTER FOR SCREENING FOR COVID-19: ICD-10-CM

## 2024-02-02 DIAGNOSIS — D64.9 ANEMIA, UNSPECIFIED: ICD-10-CM

## 2024-02-02 DIAGNOSIS — G93.1 ANOXIC BRAIN DAMAGE, NOT ELSEWHERE CLASSIFIED: ICD-10-CM

## 2024-02-02 DIAGNOSIS — J81.0 ACUTE PULMONARY EDEMA: ICD-10-CM

## 2024-02-02 DIAGNOSIS — J18.1 LOBAR PNEUMONIA, UNSPECIFIED ORGANISM: ICD-10-CM

## 2024-02-02 DIAGNOSIS — Z98.2 PRESENCE OF CEREBROSPINAL FLUID DRAINAGE DEVICE: ICD-10-CM

## 2024-02-02 DIAGNOSIS — E43 UNSPECIFIED SEVERE PROTEIN-CALORIE MALNUTRITION: ICD-10-CM

## 2024-02-02 DIAGNOSIS — Z51.5 ENCOUNTER FOR PALLIATIVE CARE: ICD-10-CM

## 2024-02-02 DIAGNOSIS — J98.11 ATELECTASIS: ICD-10-CM
